# Patient Record
Sex: MALE | Race: WHITE | NOT HISPANIC OR LATINO | Employment: OTHER | URBAN - METROPOLITAN AREA
[De-identification: names, ages, dates, MRNs, and addresses within clinical notes are randomized per-mention and may not be internally consistent; named-entity substitution may affect disease eponyms.]

---

## 2017-02-02 DIAGNOSIS — Z11.59 NEED FOR HEPATITIS C SCREENING TEST: ICD-10-CM

## 2017-02-02 DIAGNOSIS — E78.2 MIXED HYPERLIPIDEMIA: ICD-10-CM

## 2017-02-02 DIAGNOSIS — Z00.00 HEALTH CARE MAINTENANCE: Primary | ICD-10-CM

## 2017-02-07 LAB
ALBUMIN SERPL-MCNC: 4.9 G/DL (ref 3.5–5.2)
ALBUMIN/GLOB SERPL: 2 G/DL
ALP SERPL-CCNC: 81 U/L (ref 39–117)
ALT SERPL-CCNC: 29 U/L (ref 1–41)
APPEARANCE UR: CLEAR
AST SERPL-CCNC: 21 U/L (ref 1–40)
BACTERIA #/AREA URNS HPF: NORMAL /HPF
BASOPHILS # BLD AUTO: 0.02 10*3/MM3 (ref 0–0.2)
BASOPHILS NFR BLD AUTO: 0.3 % (ref 0–1.5)
BILIRUB SERPL-MCNC: 1.1 MG/DL (ref 0.1–1.2)
BILIRUB UR QL STRIP: NEGATIVE
BUN SERPL-MCNC: 13 MG/DL (ref 8–23)
BUN/CREAT SERPL: 11.2 (ref 7–25)
CALCIUM SERPL-MCNC: 9.6 MG/DL (ref 8.6–10.5)
CHLORIDE SERPL-SCNC: 98 MMOL/L (ref 98–107)
CHOLEST SERPL-MCNC: 155 MG/DL (ref 0–200)
CO2 SERPL-SCNC: 25.5 MMOL/L (ref 22–29)
COLOR UR: YELLOW
CREAT SERPL-MCNC: 1.16 MG/DL (ref 0.76–1.27)
EOSINOPHIL # BLD AUTO: 0.22 10*3/MM3 (ref 0–0.7)
EOSINOPHIL NFR BLD AUTO: 3 % (ref 0.3–6.2)
EPI CELLS #/AREA URNS HPF: NORMAL /HPF
ERYTHROCYTE [DISTWIDTH] IN BLOOD BY AUTOMATED COUNT: 13.1 % (ref 11.5–14.5)
GLOBULIN SER CALC-MCNC: 2.5 GM/DL
GLUCOSE SERPL-MCNC: 91 MG/DL (ref 65–99)
GLUCOSE UR QL: NEGATIVE
HBA1C MFR BLD: 5.4 % (ref 4.8–5.6)
HCT VFR BLD AUTO: 47.6 % (ref 40.4–52.2)
HCV AB S/CO SERPL IA: <0.1 S/CO RATIO (ref 0–0.9)
HDLC SERPL-MCNC: 59 MG/DL (ref 40–60)
HGB BLD-MCNC: 15.2 G/DL (ref 13.7–17.6)
HGB UR QL STRIP: NEGATIVE
IMM GRANULOCYTES # BLD: 0.02 10*3/MM3 (ref 0–0.03)
IMM GRANULOCYTES NFR BLD: 0.3 % (ref 0–0.5)
KETONES UR QL STRIP: NEGATIVE
LDLC SERPL CALC-MCNC: 64 MG/DL (ref 0–100)
LEUKOCYTE ESTERASE UR QL STRIP: NEGATIVE
LYMPHOCYTES # BLD AUTO: 1.97 10*3/MM3 (ref 0.9–4.8)
LYMPHOCYTES NFR BLD AUTO: 27.1 % (ref 19.6–45.3)
MCH RBC QN AUTO: 29.5 PG (ref 27–32.7)
MCHC RBC AUTO-ENTMCNC: 31.9 G/DL (ref 32.6–36.4)
MCV RBC AUTO: 92.4 FL (ref 79.8–96.2)
MICRO URNS: NORMAL
MICRO URNS: NORMAL
MONOCYTES # BLD AUTO: 0.7 10*3/MM3 (ref 0.2–1.2)
MONOCYTES NFR BLD AUTO: 9.6 % (ref 5–12)
MUCOUS THREADS URNS QL MICRO: PRESENT /HPF
NEUTROPHILS # BLD AUTO: 4.35 10*3/MM3 (ref 1.9–8.1)
NEUTROPHILS NFR BLD AUTO: 59.7 % (ref 42.7–76)
NITRITE UR QL STRIP: NEGATIVE
PH UR STRIP: 7 [PH] (ref 5–7.5)
PLATELET # BLD AUTO: 196 10*3/MM3 (ref 140–500)
POTASSIUM SERPL-SCNC: 4.3 MMOL/L (ref 3.5–5.2)
PROT SERPL-MCNC: 7.4 G/DL (ref 6–8.5)
PROT UR QL STRIP: NEGATIVE
PSA SERPL-MCNC: 0.7 NG/ML (ref 0–4)
RBC # BLD AUTO: 5.15 10*6/MM3 (ref 4.6–6)
RBC #/AREA URNS HPF: NORMAL /HPF
SODIUM SERPL-SCNC: 140 MMOL/L (ref 136–145)
SP GR UR: 1.02 (ref 1–1.03)
TRIGL SERPL-MCNC: 160 MG/DL (ref 0–150)
TSH SERPL DL<=0.005 MIU/L-ACNC: 2.01 MIU/ML (ref 0.27–4.2)
URINALYSIS REFLEX: NORMAL
UROBILINOGEN UR STRIP-MCNC: 0.2 MG/DL (ref 0.2–1)
VLDLC SERPL CALC-MCNC: 32 MG/DL (ref 5–40)
WBC # BLD AUTO: 7.28 10*3/MM3 (ref 4.5–10.7)
WBC #/AREA URNS HPF: NORMAL /HPF

## 2017-02-16 ENCOUNTER — OFFICE VISIT (OUTPATIENT)
Dept: INTERNAL MEDICINE | Facility: CLINIC | Age: 62
End: 2017-02-16

## 2017-02-16 VITALS
RESPIRATION RATE: 18 BRPM | SYSTOLIC BLOOD PRESSURE: 116 MMHG | HEART RATE: 80 BPM | HEIGHT: 73 IN | OXYGEN SATURATION: 98 % | DIASTOLIC BLOOD PRESSURE: 72 MMHG | BODY MASS INDEX: 28.63 KG/M2 | WEIGHT: 216 LBS

## 2017-02-16 DIAGNOSIS — Z00.00 HEALTH MAINTENANCE EXAMINATION: Primary | ICD-10-CM

## 2017-02-16 DIAGNOSIS — E78.2 MIXED HYPERLIPIDEMIA: ICD-10-CM

## 2017-02-16 DIAGNOSIS — M25.561 ACUTE PAIN OF RIGHT KNEE: ICD-10-CM

## 2017-02-16 DIAGNOSIS — M79.672 CHRONIC PAIN OF LEFT HEEL: ICD-10-CM

## 2017-02-16 DIAGNOSIS — G89.29 CHRONIC PAIN OF LEFT HEEL: ICD-10-CM

## 2017-02-16 PROCEDURE — 99396 PREV VISIT EST AGE 40-64: CPT | Performed by: INTERNAL MEDICINE

## 2017-02-16 NOTE — PROGRESS NOTES
Chief Complaint  Uriel Garcia is a 61 y.o. male who presents for Annual Exam (CPE)  .    History of Present Illness   Uriel is a new patient here to establish care.  He was a former patient of Dr. Melgar and then Dr. Lopez.    Prostate Exam: his work does a PSA annually.  He declines a rectal exam.    C-scope: 2014 due 2017 Dr. Skaggs    Exercise: 4 days a week for 1.5 hours.      Left heel pain for 6 months.  He has orthotics.  He stopped using his orthotics to see if this is the culprit.  He has recently started to have pain in his right knee which he suspects is the result of changing his gait.    He also has an elbow issue that he saw PT for.  It's not tennis elbow.  He can play tennis now without it bothering him.  He has a popping in his shoulder and elbow.  He had some issues with his kidney function.  He was told he should avoid nsaids due to this but he has had to start using it more.      Immunization History   Administered Date(s) Administered   • Influenza (IM) Preservative Free 10/20/2016   • Influenza TIV (IM) 11/17/2015   • Td 08/03/2015       Review of Systems   Constitutional: Negative for chills, fatigue and fever.   HENT: Negative for hearing loss, sore throat, tinnitus, trouble swallowing and voice change.    Eyes: Negative for visual disturbance.   Respiratory: Negative for cough and shortness of breath.    Cardiovascular: Negative for chest pain, palpitations and leg swelling.   Gastrointestinal: Positive for abdominal pain (he has had some cramping after lunch for the last 4-5 months.  ). Negative for abdominal distention, anal bleeding, blood in stool, constipation, diarrhea, nausea and vomiting.   Endocrine: Negative for polydipsia and polyuria.   Genitourinary: Negative for decreased urine volume, dysuria and hematuria.   Musculoskeletal: Positive for arthralgias. Negative for myalgias.   Skin: Negative for rash.   Neurological: Negative for dizziness, syncope, light-headedness and  headaches.   Hematological: Negative for adenopathy. Does not bruise/bleed easily.   Psychiatric/Behavioral: Negative for confusion and dysphoric mood. The patient is nervous/anxious (sometimes).        Patient Active Problem List   Diagnosis   • Anxiety   • Hyperlipidemia   • Right elbow pain   • Healthcare maintenance   • Right knee pain   • Chronic pain of left heel       Past Medical History   Diagnosis Date   • Anxiety    • Benign colonic polyp    • BPPV (benign paroxysmal positional vertigo) 10/20/2016   • Fall from slip, trip, or stumble    • Hyperlipidemia      was on higher dose of Lipitor in past but had elevated LFTs. Added Zetia and now in good control   • Inguinal hernia    • Leg edema, right        Past Surgical History   Procedure Laterality Date   • Colonoscopy       Dr King/EBONY   • Hernia repair       Dr Garnett/EBONY       Family History   Problem Relation Age of Onset   • Hypertension Father    • Heart attack Maternal Grandmother    • Heart disease Maternal Grandmother      ASCVD   • Depression Other      siblings   • Diabetes Other    • Diabetes Sister    • Alcohol abuse Brother    • Diabetes Brother    • Hodgkin's lymphoma Maternal Grandfather        Social History     Social History   • Marital status:      Spouse name: Mamta   • Number of children: 2   • Years of education: N/A     Occupational History   •       Social History Main Topics   • Smoking status: Never Smoker   • Smokeless tobacco: Not on file   • Alcohol use Yes      Comment: social about 8 drinks a week   • Drug use: No   • Sexual activity: Defer     Other Topics Concern   • Not on file     Social History Narrative       Current Outpatient Prescriptions on File Prior to Visit   Medication Sig Dispense Refill   • atorvastatin (LIPITOR) 10 MG tablet Take 1 tablet by mouth daily.     • [DISCONTINUED] ZETIA 10 MG tablet TAKE 1 TABLET DAILY 90 tablet 1   • meclizine (ANTIVERT) 25 MG tablet Take 1 tablet by  "mouth 3 (Three) Times a Day As Needed for dizziness. 30 tablet 0     No current facility-administered medications on file prior to visit.        No Known Allergies    Vitals:    02/16/17 1250   BP: 116/72   Pulse: 80   Resp: 18   SpO2: 98%   Weight: 216 lb (98 kg)   Height: 73\" (185.4 cm)       Body mass index is 28.5 kg/(m^2).    Objective   Physical Exam   Constitutional: He is oriented to person, place, and time. He appears well-developed and well-nourished. No distress.   HENT:   Head: Normocephalic and atraumatic.   Right Ear: Hearing and external ear normal.   Left Ear: Hearing and external ear normal.   Nose: Nose normal.   Mouth/Throat: Oropharynx is clear and moist and mucous membranes are normal.   Eyes: Conjunctivae, EOM and lids are normal. Pupils are equal, round, and reactive to light. No scleral icterus.   Neck: Trachea normal and normal range of motion. Neck supple.   Cardiovascular: Normal rate, regular rhythm and normal heart sounds.  Exam reveals no gallop and no friction rub.    No murmur heard.  Pulses:       Carotid pulses are 2+ on the right side, and 2+ on the left side.       Femoral pulses are 2+ on the right side, and 2+ on the left side.       Dorsalis pedis pulses are 2+ on the right side, and 2+ on the left side.        Posterior tibial pulses are 2+ on the right side, and 2+ on the left side.   Pulmonary/Chest: Effort normal and breath sounds normal. No respiratory distress. He has no wheezes. He has no rales.   Abdominal: Soft. Normal appearance and bowel sounds are normal. He exhibits no distension and no mass. There is no tenderness.   Musculoskeletal: Normal range of motion. He exhibits no edema.       Vascular Status -  His exam exhibits no right foot edema. His exam exhibits no left foot edema.   Skin Integrity  -  His right foot skin is intact.     Uriel 's left foot skin is intact. .  Lymphadenopathy:     He has no cervical adenopathy.        Right: No inguinal and no " supraclavicular adenopathy present.        Left: No inguinal and no supraclavicular adenopathy present.   Neurological: He is alert and oriented to person, place, and time. He has normal strength. No cranial nerve deficit. He exhibits normal muscle tone. Coordination and gait normal.   Skin: Skin is warm and dry. No rash noted.   Psychiatric: He has a normal mood and affect. His speech is normal and behavior is normal. Judgment and thought content normal. Cognition and memory are normal.   Vitals reviewed.        Results for orders placed or performed in visit on 02/02/17   Comprehensive Metabolic Panel   Result Value Ref Range    Glucose 91 65 - 99 mg/dL    BUN 13 8 - 23 mg/dL    Creatinine 1.16 0.76 - 1.27 mg/dL    eGFR Non African Am 64 >60 mL/min/1.73    eGFR African Am 78 >60 mL/min/1.73    BUN/Creatinine Ratio 11.2 7.0 - 25.0    Sodium 140 136 - 145 mmol/L    Potassium 4.3 3.5 - 5.2 mmol/L    Chloride 98 98 - 107 mmol/L    Total CO2 25.5 22.0 - 29.0 mmol/L    Calcium 9.6 8.6 - 10.5 mg/dL    Total Protein 7.4 6.0 - 8.5 g/dL    Albumin 4.90 3.50 - 5.20 g/dL    Globulin 2.5 gm/dL    A/G Ratio 2.0 g/dL    Total Bilirubin 1.1 0.1 - 1.2 mg/dL    Alkaline Phosphatase 81 39 - 117 U/L    AST (SGOT) 21 1 - 40 U/L    ALT (SGPT) 29 1 - 41 U/L   Lipid Panel   Result Value Ref Range    Total Cholesterol 155 0 - 200 mg/dL    Triglycerides 160 (H) 0 - 150 mg/dL    HDL Cholesterol 59 40 - 60 mg/dL    VLDL Cholesterol 32 5 - 40 mg/dL    LDL Cholesterol  64 0 - 100 mg/dL   TSH Rfx On Abnormal To Free T4   Result Value Ref Range    TSH 2.01 0.27 - 4.2 mIU/mL   UA / M With / Rflx Culture([facility] ONLY)   Result Value Ref Range    Specific Gravity, UA 1.024 1.005 - 1.030    pH, UA 7.0 5.0 - 7.5    Color, UA Yellow Yellow    Appearance, UA Clear Clear    Leukocytes, UA Negative Negative    Protein Negative Negative/Trace    Glucose, UA Negative Negative    Ketones Negative Negative    Blood, UA Negative Negative    Bilirubin, UA  Negative Negative    Urobilinogen, UA 0.2 0.2 - 1.0 mg/dL    Nitrite, UA Negative Negative    Microscopic Examination Comment     MICROSCOPIC EXAMINATION See below:     Urinalysis Reflex Comment    PSA   Result Value Ref Range    PSA 0.704 0.000 - 4.000 ng/mL   Hemoglobin A1c   Result Value Ref Range    Hemoglobin A1C 5.40 4.80 - 5.60 %   Hepatitis C Antibody   Result Value Ref Range    Hep C Virus Ab <0.1 0.0 - 0.9 s/co ratio   Microscopic Examination   Result Value Ref Range    WBC, UA 0-5 0 - 5 /hpf    RBC, UA 0-2 0 - 2 /hpf    Epithelial Cells (non renal) None seen 0 - 10 /hpf    Mucus, UA Present Not Estab. /hpf    Bacteria, UA None seen None seen/Few /hpf   CBC & Differential   Result Value Ref Range    WBC 7.28 4.50 - 10.70 10*3/mm3    RBC 5.15 4.60 - 6.00 10*6/mm3    Hemoglobin 15.2 13.7 - 17.6 g/dL    Hematocrit 47.6 40.4 - 52.2 %    MCV 92.4 79.8 - 96.2 fL    MCH 29.5 27.0 - 32.7 pg    MCHC 31.9 (L) 32.6 - 36.4 g/dL    RDW 13.1 11.5 - 14.5 %    Platelets 196 140 - 500 10*3/mm3    Neutrophil Rel % 59.7 42.7 - 76.0 %    Lymphocyte Rel % 27.1 19.6 - 45.3 %    Monocyte Rel % 9.6 5.0 - 12.0 %    Eosinophil Rel % 3.0 0.3 - 6.2 %    Basophil Rel % 0.3 0.0 - 1.5 %    Neutrophils Absolute 4.35 1.90 - 8.10 10*3/mm3    Lymphocytes Absolute 1.97 0.90 - 4.80 10*3/mm3    Monocytes Absolute 0.70 0.20 - 1.20 10*3/mm3    Eosinophils Absolute 0.22 0.00 - 0.70 10*3/mm3    Basophils Absolute 0.02 0.00 - 0.20 10*3/mm3    Immature Granulocyte Rel % 0.3 0.0 - 0.5 %    Immature Grans Absolute 0.02 0.00 - 0.03 10*3/mm3       Assessment/Plan   Diagnoses and all orders for this visit:    Health maintenance examination  -     Cancel: Varicella-Zoster Vaccine Subcutaneous    Mixed hyperlipidemia  -     Hepatic Function Panel; Future  -     Lipid Panel; Future    Acute pain of right knee  -     Ambulatory Referral to Orthopedic Surgery    Chronic pain of left heel  -     Ambulatory Referral to Orthopedic  Surgery        Discussion/Summary  Uriel is here for his physical to establish care.  He is a very nice 60 y/o with few medical issues.  I am stopping his zetia as I do not think he needs this.  Continue atorvastatin.  His biggest issue is his heel and knee pain.  He is very active and wants to remain active.  I am going to have him see Dr. Ibanez and if he thinks he should see Dr. Pichardo for his foot, I will let him make that call.    Return in about 6 months (around 8/16/2017) for Next scheduled follow up, with fasting labs prior.

## 2017-02-21 RX ORDER — ATORVASTATIN CALCIUM 10 MG/1
10 TABLET, FILM COATED ORAL DAILY
Qty: 90 TABLET | Refills: 3 | Status: SHIPPED | OUTPATIENT
Start: 2017-02-21 | End: 2018-01-24 | Stop reason: SDUPTHER

## 2017-08-02 ENCOUNTER — OFFICE VISIT (OUTPATIENT)
Dept: INTERNAL MEDICINE | Facility: CLINIC | Age: 62
End: 2017-08-02

## 2017-08-02 VITALS
SYSTOLIC BLOOD PRESSURE: 112 MMHG | HEIGHT: 73 IN | DIASTOLIC BLOOD PRESSURE: 84 MMHG | OXYGEN SATURATION: 99 % | RESPIRATION RATE: 18 BRPM | BODY MASS INDEX: 28.63 KG/M2 | HEART RATE: 80 BPM | WEIGHT: 216 LBS

## 2017-08-02 DIAGNOSIS — M25.611 DECREASED RANGE OF MOTION OF RIGHT SHOULDER: ICD-10-CM

## 2017-08-02 DIAGNOSIS — M25.621 DECREASED RANGE OF MOTION OF RIGHT ELBOW: ICD-10-CM

## 2017-08-02 DIAGNOSIS — M25.511 CHRONIC RIGHT SHOULDER PAIN: Primary | ICD-10-CM

## 2017-08-02 DIAGNOSIS — G89.29 CHRONIC RIGHT SHOULDER PAIN: Primary | ICD-10-CM

## 2017-08-02 PROCEDURE — 99213 OFFICE O/P EST LOW 20 MIN: CPT | Performed by: INTERNAL MEDICINE

## 2017-08-02 NOTE — PROGRESS NOTES
Chief Complaint  Uriel Garcia is a 61 y.o. male who presents for Arm Pain (Right arm, pain for over a year now. Whole shoulder has terrible pain when reaching far or putting his arm in certain positions. Pt is becoming left handed because of the pain. ) and Follow-up (Had mentioned this once before, but getting worse. All started because of a massage, they pulled his arm backwards and the shoulder popped really loud, )  .    History of Present Illness   Many months ago, he had an elbow issue on the right.  He was treated with PT.  He did not have an MRI because Dr. Lopez didn't feel like insurance would pay for an MRI.  He developed a popping in his shoulder and a locking in his shoulder prior to the shoulder popping.  He has quit playing tennis.  Lately, his pain has been getting worse.  The pain gets to an 8/10.  It only lasts 5-10 seconds at a time.  He can't reach laterally or back.  There are times he has pain putting his belt on or washing his back.  He had been taking meloxicam for about three months.  This didn't help all that much.  No weakness in his arm.  He has tried icing his shoulder but it didn't really help.  The pain is now waking him up at night.       Review of Systems   Musculoskeletal: Positive for joint pain (right shoulder). Negative for back pain, falls, joint swelling, muscle weakness and neck pain.       Patient Active Problem List   Diagnosis   • Anxiety   • Hyperlipidemia   • Right elbow pain   • Healthcare maintenance   • Right knee pain   • Chronic pain of left heel   • Chronic right shoulder pain   • Decreased range of motion of right shoulder   • Decreased range of motion of right elbow       Past Medical History:   Diagnosis Date   • Anxiety    • Benign colonic polyp    • BPPV (benign paroxysmal positional vertigo) 10/20/2016   • Fall from slip, trip, or stumble    • Hyperlipidemia     was on higher dose of Lipitor in past but had elevated LFTs. Added Zetia and now in good  "control   • Inguinal hernia    • Leg edema, right        Past Surgical History:   Procedure Laterality Date   • COLONOSCOPY      Dr King/EBONY   • HERNIA REPAIR      Dr Garnett/EBONY       Family History   Problem Relation Age of Onset   • Hypertension Father    • Heart attack Maternal Grandmother    • Heart disease Maternal Grandmother      ASCVD   • Depression Other      siblings   • Diabetes Other    • Diabetes Sister    • Alcohol abuse Brother    • Diabetes Brother    • Hodgkin's lymphoma Maternal Grandfather        Social History     Social History   • Marital status:      Spouse name: Mamta   • Number of children: 2   • Years of education: N/A     Occupational History   •       Social History Main Topics   • Smoking status: Never Smoker   • Smokeless tobacco: Not on file   • Alcohol use Yes      Comment: social about 8 drinks a week   • Drug use: No   • Sexual activity: Defer     Other Topics Concern   • Not on file     Social History Narrative       Current Outpatient Prescriptions on File Prior to Visit   Medication Sig Dispense Refill   • atorvastatin (LIPITOR) 10 MG tablet Take 1 tablet by mouth Daily. 90 tablet 3   • meclizine (ANTIVERT) 25 MG tablet Take 1 tablet by mouth 3 (Three) Times a Day As Needed for dizziness. 30 tablet 0     No current facility-administered medications on file prior to visit.        No Known Allergies    Vitals:    08/02/17 1406   BP: 112/84   Pulse: 80   Resp: 18   SpO2: 99%   Weight: 216 lb (98 kg)   Height: 73\" (185.4 cm)       Body mass index is 28.5 kg/(m^2).    Objective   Physical Exam   Constitutional: He appears well-developed and well-nourished. No distress.   HENT:   Head: Normocephalic and atraumatic.   Neck: Normal range of motion. Neck supple.   Musculoskeletal:        Right shoulder: He exhibits decreased range of motion, tenderness and pain. He exhibits no bony tenderness, no swelling, no effusion, no crepitus and no spasm.        Right " elbow: He exhibits decreased range of motion. He exhibits no swelling and no effusion. No tenderness found.   5/5 strength.  Very limited ROM due to pain.  Unable to really put his right arm behind his back.  He really braces against most of the shoulder manipulation for fear of pain.    Lymphadenopathy:     He has no cervical adenopathy.       Results for orders placed or performed in visit on 02/02/17   Comprehensive Metabolic Panel   Result Value Ref Range    Glucose 91 65 - 99 mg/dL    BUN 13 8 - 23 mg/dL    Creatinine 1.16 0.76 - 1.27 mg/dL    eGFR Non African Am 64 >60 mL/min/1.73    eGFR African Am 78 >60 mL/min/1.73    BUN/Creatinine Ratio 11.2 7.0 - 25.0    Sodium 140 136 - 145 mmol/L    Potassium 4.3 3.5 - 5.2 mmol/L    Chloride 98 98 - 107 mmol/L    Total CO2 25.5 22.0 - 29.0 mmol/L    Calcium 9.6 8.6 - 10.5 mg/dL    Total Protein 7.4 6.0 - 8.5 g/dL    Albumin 4.90 3.50 - 5.20 g/dL    Globulin 2.5 gm/dL    A/G Ratio 2.0 g/dL    Total Bilirubin 1.1 0.1 - 1.2 mg/dL    Alkaline Phosphatase 81 39 - 117 U/L    AST (SGOT) 21 1 - 40 U/L    ALT (SGPT) 29 1 - 41 U/L   Lipid Panel   Result Value Ref Range    Total Cholesterol 155 0 - 200 mg/dL    Triglycerides 160 (H) 0 - 150 mg/dL    HDL Cholesterol 59 40 - 60 mg/dL    VLDL Cholesterol 32 5 - 40 mg/dL    LDL Cholesterol  64 0 - 100 mg/dL   TSH Rfx On Abnormal To Free T4   Result Value Ref Range    TSH 2.01 0.27 - 4.2 mIU/mL   UA / M With / Rflx Culture([facility] ONLY)   Result Value Ref Range    Specific Gravity, UA 1.024 1.005 - 1.030    pH, UA 7.0 5.0 - 7.5    Color, UA Yellow Yellow    Appearance, UA Clear Clear    Leukocytes, UA Negative Negative    Protein Negative Negative/Trace    Glucose, UA Negative Negative    Ketones Negative Negative    Blood, UA Negative Negative    Bilirubin, UA Negative Negative    Urobilinogen, UA 0.2 0.2 - 1.0 mg/dL    Nitrite, UA Negative Negative    Microscopic Examination Comment     MICROSCOPIC EXAMINATION See below:      Urinalysis Reflex Comment    PSA   Result Value Ref Range    PSA 0.704 0.000 - 4.000 ng/mL   Hemoglobin A1c   Result Value Ref Range    Hemoglobin A1C 5.40 4.80 - 5.60 %   Hepatitis C Antibody   Result Value Ref Range    Hep C Virus Ab <0.1 0.0 - 0.9 s/co ratio   Microscopic Examination   Result Value Ref Range    WBC, UA 0-5 0 - 5 /hpf    RBC, UA 0-2 0 - 2 /hpf    Epithelial Cells (non renal) None seen 0 - 10 /hpf    Mucus, UA Present Not Estab. /hpf    Bacteria, UA None seen None seen/Few /hpf   CBC & Differential   Result Value Ref Range    WBC 7.28 4.50 - 10.70 10*3/mm3    RBC 5.15 4.60 - 6.00 10*6/mm3    Hemoglobin 15.2 13.7 - 17.6 g/dL    Hematocrit 47.6 40.4 - 52.2 %    MCV 92.4 79.8 - 96.2 fL    MCH 29.5 27.0 - 32.7 pg    MCHC 31.9 (L) 32.6 - 36.4 g/dL    RDW 13.1 11.5 - 14.5 %    Platelets 196 140 - 500 10*3/mm3    Neutrophil Rel % 59.7 42.7 - 76.0 %    Lymphocyte Rel % 27.1 19.6 - 45.3 %    Monocyte Rel % 9.6 5.0 - 12.0 %    Eosinophil Rel % 3.0 0.3 - 6.2 %    Basophil Rel % 0.3 0.0 - 1.5 %    Neutrophils Absolute 4.35 1.90 - 8.10 10*3/mm3    Lymphocytes Absolute 1.97 0.90 - 4.80 10*3/mm3    Monocytes Absolute 0.70 0.20 - 1.20 10*3/mm3    Eosinophils Absolute 0.22 0.00 - 0.70 10*3/mm3    Basophils Absolute 0.02 0.00 - 0.20 10*3/mm3    Immature Granulocyte Rel % 0.3 0.0 - 0.5 %    Immature Grans Absolute 0.02 0.00 - 0.03 10*3/mm3       Assessment/Plan   Diagnoses and all orders for this visit:    Chronic right shoulder pain  -     Ambulatory Referral to Orthopedic Surgery    Decreased range of motion of right shoulder  -     Ambulatory Referral to Orthopedic Surgery    Decreased range of motion of right elbow      Discussion/Summary  Uriel is here for acute care for a new and worsening issue.  Rather than send him for an MRI first, we are going to try to get him in to see Dr. Barger in case he would prefer to get an MR arthrogram of his shoulder.  His symptoms have worsened such that he didn't want to wait  two weeks to see me at his regularly scheduled follow up appt.    No Follow-up on file.

## 2017-08-11 DIAGNOSIS — E78.2 MIXED HYPERLIPIDEMIA: ICD-10-CM

## 2017-08-14 LAB
ALBUMIN SERPL-MCNC: 4.6 G/DL (ref 3.5–5.2)
ALP SERPL-CCNC: 63 U/L (ref 39–117)
ALT SERPL-CCNC: 29 U/L (ref 1–41)
AST SERPL-CCNC: 22 U/L (ref 1–40)
BILIRUB DIRECT SERPL-MCNC: 0.2 MG/DL (ref 0–0.3)
BILIRUB SERPL-MCNC: 1.2 MG/DL (ref 0.1–1.2)
CHOLEST SERPL-MCNC: 205 MG/DL (ref 0–200)
HDLC SERPL-MCNC: 54 MG/DL (ref 40–60)
LDLC SERPL CALC-MCNC: 127 MG/DL (ref 0–100)
PROT SERPL-MCNC: 7.3 G/DL (ref 6–8.5)
TRIGL SERPL-MCNC: 122 MG/DL (ref 0–150)
VLDLC SERPL CALC-MCNC: 24.4 MG/DL (ref 5–40)

## 2017-08-17 ENCOUNTER — OFFICE VISIT (OUTPATIENT)
Dept: INTERNAL MEDICINE | Facility: CLINIC | Age: 62
End: 2017-08-17

## 2017-08-17 VITALS
HEIGHT: 73 IN | HEART RATE: 69 BPM | RESPIRATION RATE: 18 BRPM | DIASTOLIC BLOOD PRESSURE: 82 MMHG | BODY MASS INDEX: 28.36 KG/M2 | OXYGEN SATURATION: 98 % | WEIGHT: 214 LBS | SYSTOLIC BLOOD PRESSURE: 132 MMHG

## 2017-08-17 DIAGNOSIS — M25.611 DECREASED RANGE OF MOTION OF RIGHT SHOULDER: ICD-10-CM

## 2017-08-17 DIAGNOSIS — E78.2 MIXED HYPERLIPIDEMIA: Primary | ICD-10-CM

## 2017-08-17 DIAGNOSIS — N52.9 ERECTILE DYSFUNCTION, UNSPECIFIED ERECTILE DYSFUNCTION TYPE: ICD-10-CM

## 2017-08-17 PROCEDURE — 99214 OFFICE O/P EST MOD 30 MIN: CPT | Performed by: INTERNAL MEDICINE

## 2017-08-17 RX ORDER — SILDENAFIL 50 MG/1
50 TABLET, FILM COATED ORAL DAILY PRN
Qty: 10 TABLET | Refills: 4 | Status: SHIPPED | OUTPATIENT
Start: 2017-08-17 | End: 2017-11-02 | Stop reason: SDUPTHER

## 2017-08-17 NOTE — PROGRESS NOTES
Chief Complaint  Uriel Garcia is a 61 y.o. male who presents for Hyperlipidemia and Follow-up (6 month)  .    History of Present Illness     He saw a PA with the ortho group for his knee.  She had told him he would need knee surgery.  He finally saw Dr. Ibanez who told him he doesn't need surgery or knee replacement.  His knee is significantly better after medical treatment.    He saw Dr. Barger for his shoulder.  He has an MRI scheduled for tomorrow.  Dr. Barger believes this to be adhesive capsulitis and that he will likely benefit from surgical intervention.      HLD - No cp or palp or soa.  We did take him off of zetia.  His LDL increased some but he does not have DM or vascular disease.      He has a new issue to address.  He has erectile dysfunction.  He has never taken anything for this because his wife has been reluctant to have him use medication.  When he was a patient of Dr. Melgar, he was having similar issues and is positive that Ramón checked his testosterone on several occasions and that it was never low.        Review of Systems   Constitution: Negative for chills, fever, malaise/fatigue, weight gain and weight loss.   Cardiovascular: Negative for chest pain, dyspnea on exertion, leg swelling and palpitations.   Musculoskeletal: Positive for joint pain and stiffness.   Genitourinary: Negative for decreased libido.        ED       Patient Active Problem List   Diagnosis   • Anxiety   • Hyperlipidemia   • Right elbow pain   • Healthcare maintenance   • Right knee pain   • Chronic pain of left heel   • Chronic right shoulder pain   • Decreased range of motion of right shoulder   • Decreased range of motion of right elbow   • Erectile dysfunction       Past Medical History:   Diagnosis Date   • Anxiety    • Benign colonic polyp    • BPPV (benign paroxysmal positional vertigo) 10/20/2016   • Fall from slip, trip, or stumble    • Hyperlipidemia     was on higher dose of Lipitor in past but had elevated  "LFTs. Added Zetia and now in good control   • Inguinal hernia    • Leg edema, right        Past Surgical History:   Procedure Laterality Date   • COLONOSCOPY      Dr King/EBONY   • HERNIA REPAIR      Dr Garnett/EBONY       Family History   Problem Relation Age of Onset   • Hypertension Father    • Heart attack Maternal Grandmother    • Heart disease Maternal Grandmother      ASCVD   • Depression Other      siblings   • Diabetes Other    • Diabetes Sister    • Alcohol abuse Brother    • Diabetes Brother    • Hodgkin's lymphoma Maternal Grandfather        Social History     Social History   • Marital status:      Spouse name: Mamta   • Number of children: 2   • Years of education: N/A     Occupational History   •       Social History Main Topics   • Smoking status: Never Smoker   • Smokeless tobacco: Not on file   • Alcohol use Yes      Comment: social about 8 drinks a week   • Drug use: No   • Sexual activity: Defer     Other Topics Concern   • Not on file     Social History Narrative       Current Outpatient Prescriptions on File Prior to Visit   Medication Sig Dispense Refill   • atorvastatin (LIPITOR) 10 MG tablet Take 1 tablet by mouth Daily. 90 tablet 3   • meclizine (ANTIVERT) 25 MG tablet Take 1 tablet by mouth 3 (Three) Times a Day As Needed for dizziness. 30 tablet 0     No current facility-administered medications on file prior to visit.        No Known Allergies    Vitals:    08/17/17 1434   BP: 132/82   Pulse: 69   Resp: 18   SpO2: 98%   Weight: 214 lb (97.1 kg)   Height: 73\" (185.4 cm)       Body mass index is 28.23 kg/(m^2).    Objective   Physical Exam   Constitutional: He is oriented to person, place, and time. He appears well-developed and well-nourished. No distress.   HENT:   Head: Normocephalic and atraumatic.   Eyes: Conjunctivae are normal. No scleral icterus.   Neck: Normal range of motion. Neck supple.   Cardiovascular: Normal rate, regular rhythm and normal heart sounds. "  Exam reveals no gallop and no friction rub.    No murmur heard.  Pulmonary/Chest: Effort normal and breath sounds normal. No respiratory distress. He has no wheezes. He has no rales.   Musculoskeletal: Normal range of motion. He exhibits no edema.   Lymphadenopathy:     He has no cervical adenopathy.   Neurological: He is alert and oriented to person, place, and time. No cranial nerve deficit.   Psychiatric: He has a normal mood and affect. His behavior is normal. Judgment and thought content normal.       Results for orders placed or performed in visit on 08/11/17   Lipid Panel   Result Value Ref Range    Total Cholesterol 205 (H) 0 - 200 mg/dL    Triglycerides 122 0 - 150 mg/dL    HDL Cholesterol 54 40 - 60 mg/dL    VLDL Cholesterol 24.4 5 - 40 mg/dL    LDL Cholesterol  127 (H) 0 - 100 mg/dL   Hepatic Function Panel   Result Value Ref Range    Total Protein 7.3 6.0 - 8.5 g/dL    Albumin 4.60 3.50 - 5.20 g/dL    Total Bilirubin 1.2 0.1 - 1.2 mg/dL    Bilirubin, Direct 0.2 0.0 - 0.3 mg/dL    Alkaline Phosphatase 63 39 - 117 U/L    AST (SGOT) 22 1 - 40 U/L    ALT (SGPT) 29 1 - 41 U/L       Assessment/Plan   Diagnoses and all orders for this visit:    Mixed hyperlipidemia    Erectile dysfunction, unspecified erectile dysfunction type  -     sildenafil (VIAGRA) 50 MG tablet; Take 1 tablet by mouth Daily As Needed for erectile dysfunction.    Decreased range of motion of right shoulder        Discussion/Summary  Uriel is here to follow up on the issues as noted above.  His labs all look good.  His inc in LDL since being off of zetia is acceptable.  He is on a very low dose of lipitor that we could increase if his goal LDL were to change. I have discussed his ED in detail and have offered to retest his testosterone but he declined.  He would rather try viagra now that his wife is agreeable to this .   Return in about 6 months (around 2/17/2018) for Annual physical, with fasting labs prior.

## 2017-09-08 ENCOUNTER — APPOINTMENT (OUTPATIENT)
Dept: PREADMISSION TESTING | Facility: HOSPITAL | Age: 62
End: 2017-09-08

## 2017-09-08 VITALS
OXYGEN SATURATION: 97 % | HEART RATE: 69 BPM | WEIGHT: 218.56 LBS | TEMPERATURE: 97.8 F | DIASTOLIC BLOOD PRESSURE: 75 MMHG | RESPIRATION RATE: 16 BRPM | BODY MASS INDEX: 28.97 KG/M2 | HEIGHT: 73 IN | SYSTOLIC BLOOD PRESSURE: 124 MMHG

## 2017-09-08 LAB
ALBUMIN SERPL-MCNC: 4.4 G/DL (ref 3.5–5.2)
ALBUMIN/GLOB SERPL: 1.6 G/DL
ALP SERPL-CCNC: 58 U/L (ref 39–117)
ALT SERPL W P-5'-P-CCNC: 30 U/L (ref 1–41)
ANION GAP SERPL CALCULATED.3IONS-SCNC: 13 MMOL/L
AST SERPL-CCNC: 22 U/L (ref 1–40)
BILIRUB SERPL-MCNC: 0.8 MG/DL (ref 0.1–1.2)
BUN BLD-MCNC: 14 MG/DL (ref 8–23)
BUN/CREAT SERPL: 14.4 (ref 7–25)
CALCIUM SPEC-SCNC: 9.1 MG/DL (ref 8.6–10.5)
CHLORIDE SERPL-SCNC: 104 MMOL/L (ref 98–107)
CO2 SERPL-SCNC: 24 MMOL/L (ref 22–29)
CREAT BLD-MCNC: 0.97 MG/DL (ref 0.76–1.27)
DEPRECATED RDW RBC AUTO: 40.9 FL (ref 37–54)
ERYTHROCYTE [DISTWIDTH] IN BLOOD BY AUTOMATED COUNT: 12.1 % (ref 11.5–14.5)
GFR SERPL CREATININE-BSD FRML MDRD: 79 ML/MIN/1.73
GLOBULIN UR ELPH-MCNC: 2.7 GM/DL
GLUCOSE BLD-MCNC: 86 MG/DL (ref 65–99)
HCT VFR BLD AUTO: 46.6 % (ref 40.4–52.2)
HGB BLD-MCNC: 15.5 G/DL (ref 13.7–17.6)
MCH RBC QN AUTO: 30.5 PG (ref 27–32.7)
MCHC RBC AUTO-ENTMCNC: 33.3 G/DL (ref 32.6–36.4)
MCV RBC AUTO: 91.6 FL (ref 79.8–96.2)
PLATELET # BLD AUTO: 157 10*3/MM3 (ref 140–500)
PMV BLD AUTO: 10.3 FL (ref 6–12)
POTASSIUM BLD-SCNC: 4.2 MMOL/L (ref 3.5–5.2)
PROT SERPL-MCNC: 7.1 G/DL (ref 6–8.5)
RBC # BLD AUTO: 5.09 10*6/MM3 (ref 4.6–6)
SODIUM BLD-SCNC: 141 MMOL/L (ref 136–145)
WBC NRBC COR # BLD: 5.74 10*3/MM3 (ref 4.5–10.7)

## 2017-09-08 PROCEDURE — 36415 COLL VENOUS BLD VENIPUNCTURE: CPT

## 2017-09-08 PROCEDURE — 93005 ELECTROCARDIOGRAM TRACING: CPT

## 2017-09-08 PROCEDURE — 80053 COMPREHEN METABOLIC PANEL: CPT | Performed by: ORTHOPAEDIC SURGERY

## 2017-09-08 PROCEDURE — 85027 COMPLETE CBC AUTOMATED: CPT | Performed by: ORTHOPAEDIC SURGERY

## 2017-09-08 PROCEDURE — 93010 ELECTROCARDIOGRAM REPORT: CPT | Performed by: INTERNAL MEDICINE

## 2017-09-08 NOTE — DISCHARGE INSTRUCTIONS
Take the following medications the morning of surgery with a small sip of water:    NONE    General Instructions:  • Do not eat solid food after midnight the night before surgery.  • You may drink clear liquids day of surgery but must stop at least one hour before your hospital arrival time.  • It is beneficial for you to have a clear drink that contains carbohydrates the day of surgery.  We suggest a 20 ounce bottle of Gatorade or Powerade for non-diabetic patients     Clear liquids are liquids you can see through.  Nothing red in color.     Plain water                               Sports drinks  Sodas                                   Gelatin (Jell-O)  Fruit juices without pulp such as white grape juice and apple juice  Popsicles that contain no fruit or yogurt  Tea or coffee (no cream or milk added)  Gatorade / Powerade  G2 / Powerade Zero    • Patients who avoid smoking, chewing tobacco and alcohol for 4 weeks prior to surgery have a reduced risk of post-operative complications.  Quit smoking as many days before surgery as you can.  • Do not smoke, use chewing tobacco or drink alcohol the day of surgery.   • Bring any papers given to you in the doctor’s office.  • Wear clean comfortable clothes and socks.  • Do not wear contact lenses or make-up.  Bring a case for your glasses.   • Leave all other valuables and jewelry at home.  • The Pre-Admission Testing nurse will instruct you to bring medications if unable to obtain an accurate list in Pre-Admission Testing.  • REPORT TO OUTPATIENT SURGERY ON 9-        Preventing a Surgical Site Infection:  • For 2 to 3 days before surgery, avoid shaving with a razor because the razor can irritate skin and make it easier to develop an infection.  • The night prior to surgery sleep in a clean bed with clean clothing.  Do not allow pets to sleep with you.  • Shower on the morning of surgery using a fresh bar of anti-bacterial soap (such as Dial) and clean washcloth.   Dry with a clean towel and dress in clean clothing.  • Ask your surgeon if you will be receiving antibiotics prior to surgery.  • Make sure you, your family, and all healthcare providers clean their hands with soap and water or an alcohol based hand  before caring for you or your wound.    Day of surgery:  Upon arrival, a Pre-op nurse and Anesthesiologist will review your health history, obtain vital signs, and answer questions you may have.  The only belongings needed at this time will be your home medications and if applicable your C-PAP/BI-PAP machine.  If you are staying overnight your family can leave the rest of your belongings in the car and bring them to your room later.  A Pre-op nurse will start an IV and you may receive medication in preparation for surgery, including something to help you relax.  Your family will be able to see you in the Pre-op area.  While you are in surgery your family should notify the waiting room  if they leave the waiting room area and provide a contact phone number.    Please be aware that surgery does come with discomfort.  We want to make every effort to control your discomfort so please discuss any uncontrolled symptoms with your nurse.   Your doctor will most likely have prescribed pain medications.      If you are going home after surgery you will receive individualized written care instructions before being discharged.  A responsible adult must drive you to and from the hospital on the day of your surgery and stay with you for 24 hours.    .    If you have any questions please call Pre-Admission Testing at [phone].    Deductibles and co-payments are collected on the day of service. Please be prepared to pay the required co-pay, deductible or deposit on the day of service as defined by your plan.

## 2017-09-12 RX ORDER — CEFAZOLIN SODIUM 2 G/100ML
2 INJECTION, SOLUTION INTRAVENOUS ONCE
Status: COMPLETED | OUTPATIENT
Start: 2017-09-14 | End: 2017-09-14

## 2017-09-12 NOTE — H&P
Provider: JORGE JIMENES MD  hospitals  Chief complaint right shoulder and elbow stiffness.  61-year-old  male who is right-hand dominant works as a  develop pain and stiffness in the right shoulder and elbow in May of this 2016.  He initially thought he is having difficulties with the elbow and was treated for tennis elbow and related symptoms.  He went to physical therapy for 2-1/2 months and he continued work on the area.  He had x-rays.  He also noticed that during the summer of 2016 that when he extended the elbow and occasionally lock up and then he would feel a pop in the shoulder in it within released somewhat.  This was intermittent.  He stopped playing tennis and some soreness improved but he continues with stiffness in the shoulder and elbow.  He has some trouble sleeping on the shoulder.  Pain level is between 1 and 8 out of 10.  He has been on Advil and Mobic in the past for anti-inflammatory use.  More recently his physician and asked him to decrease his usage of that medication.  Review of Systems:  Positive for: Depression.    Patient denies: Abdominal Pain, Bleeding, Chest Pain, Convulsions/Seizure, Decreased Motion, Difficulty Swallowing, Easy Bruisability, Emotional Disturbances, Eyes or Vision Problems, Fecal Incontinence, Fever/Chills, Headaches, Increased Thirst, Increased Hunger, Insomnia, Joint Pain, Nausea/Vomiting, Night Sweats, Poor Balance, Persistent Cough, Rash, Shortness of Breath, Shortness of Breath While Lying down, Skin Problems, Urinary Retention and Weakness.  Allergies:  * no known allergies  Medications:  mobic 15 mg tabs (meloxicam) 1 po qd  pennsaid 2 % trans soln (diclofenac sodium) apply bid to affected area  lipitor 10 mg oral tabs (atorvastatin calcium) daily  Patient History of:  DEPRESSION  ANXIETY DISORDER  BLOOD CLOTS/EMBOLISM - NEGATIVE  Surgical History:  Hernia Repair-[[id number]]   No Previous Orthopaedic Surgery-history   Known Family History  of:  cancer-grandparent  diabetes-sibling  Social History:  Social history taken on 08/14/2017 states MERCEDES STERLING is a  61 year old male.  He has never used tobacco products.      Past medical, social, family histories and ROS reviewed today with the patient and changes documented in the chart (08/14/2017).  Referring Dr. GENEVIEVE MARRUFO MD  PCP Dr. YARON MARTINEZ, ENRRIQUE KERR    Physical Exam  Height:  73 in.    Weight:  214 lbs.     BMI:  28.34  Pulse:  67  Blood pressure:  136 / 88 mm Hg  Mental/HEENT/Cardio/Skin  The patient's general appearance is well nourished, well hydrated, no acute distress.  Orientation is alert and oriented x 3.  The patient's mood is normal.  A head exam revealed normocephalic/atraumatic.  An eye exam revealed pupils equal.  An ears, nose, and throat exam shows normal, no lesions or deformities.  Cardiovascular exam indicates Regular rate and rhythm.  Pulmonary exam shows normal air exchange, no labored breathing, or shortness of breath.  A skin exam shows normal temperature and color in the area of examination.  A lymphatic exam reveals no adenopathy in the area of examination.      Right Shoulder  Skin is normal.  There is no warmth.  No erythema.  Lymphadenopathy is negative.  Right shoulder passive ROM today shows: Elevation = 90; ER(side) = 20; ER(abd) = 70; IR(vert) = waist.  Shoulder strength: Elevation = 5/5; ER = 5/5; IR = 5/5; ABD = 5/5.  No crepitation.  Neer test is positive.  Arc of motion is positive.  Pulses are normal.  Normal sensation.        Imaging/Diagnostic Studies  X-rays of the Right Shoulder [AP;Axillary;Grashey] were ordered and reviewed today.    Three-view x-ray right shoulder reveals normal osseous quality and joint space without abnormality.  Impression  Right shoulder adhesive capsulitis  Right elbow stiffness    maging/Diagnostic Studies  MRI right shoulder shows thickening of the axillary pouch consistent with adhesive capsulitis.  There is  rotator cuff tendinitis but no tears noted.  Impression  Right shoulder adhesive capsulitis  Right elbow stiffness    Plan  We discussed the findings in detail.  Because his range of motion is so dramatically limited I recommend right shoulder manipulation with arthroscopic lysis of adhesions.    We discussed the benefits of surgical intervention, as well as alternative treatments.  Potential surgical risks and complications include but are not limited to bleeding, infection, failure of repaired structures to heal biologically, stiffness, recurrence, nerve or vascular injury, residual pain, and the possibility of revision surgery and prolonged convalescence.  Sufficient opportunity was given to discuss the condition and treatment plan with the doctor, and all questions were answered for the patient.

## 2017-09-14 ENCOUNTER — ANESTHESIA (OUTPATIENT)
Dept: PERIOP | Facility: HOSPITAL | Age: 62
End: 2017-09-14

## 2017-09-14 ENCOUNTER — HOSPITAL ENCOUNTER (OUTPATIENT)
Facility: HOSPITAL | Age: 62
Setting detail: HOSPITAL OUTPATIENT SURGERY
Discharge: HOME | End: 2017-09-14
Attending: ORTHOPAEDIC SURGERY | Admitting: ORTHOPAEDIC SURGERY

## 2017-09-14 ENCOUNTER — ANESTHESIA EVENT (OUTPATIENT)
Dept: PERIOP | Facility: HOSPITAL | Age: 62
End: 2017-09-14

## 2017-09-14 VITALS
BODY MASS INDEX: 28.63 KG/M2 | TEMPERATURE: 97.4 F | WEIGHT: 217 LBS | RESPIRATION RATE: 16 BRPM | HEART RATE: 64 BPM | OXYGEN SATURATION: 98 % | DIASTOLIC BLOOD PRESSURE: 92 MMHG | SYSTOLIC BLOOD PRESSURE: 132 MMHG

## 2017-09-14 DIAGNOSIS — G89.29 CHRONIC RIGHT SHOULDER PAIN: Primary | ICD-10-CM

## 2017-09-14 DIAGNOSIS — M25.511 CHRONIC RIGHT SHOULDER PAIN: Primary | ICD-10-CM

## 2017-09-14 PROCEDURE — 25010000002 TRIAMCINOLONE PER 10 MG: Performed by: ORTHOPAEDIC SURGERY

## 2017-09-14 PROCEDURE — 25010000002 EPINEPHRINE PER 0.1 MG: Performed by: ORTHOPAEDIC SURGERY

## 2017-09-14 PROCEDURE — 25010000002 ONDANSETRON PER 1 MG: Performed by: NURSE ANESTHETIST, CERTIFIED REGISTERED

## 2017-09-14 PROCEDURE — 25010000002 MIDAZOLAM PER 1 MG: Performed by: ANESTHESIOLOGY

## 2017-09-14 PROCEDURE — 25010000002 DEXAMETHASONE PER 1 MG: Performed by: NURSE ANESTHETIST, CERTIFIED REGISTERED

## 2017-09-14 PROCEDURE — 25010000002 PROPOFOL 10 MG/ML EMULSION: Performed by: NURSE ANESTHETIST, CERTIFIED REGISTERED

## 2017-09-14 PROCEDURE — 25010000003 CEFAZOLIN IN DEXTROSE 2-4 GM/100ML-% SOLUTION: Performed by: ORTHOPAEDIC SURGERY

## 2017-09-14 PROCEDURE — 25010000002 FENTANYL CITRATE (PF) 100 MCG/2ML SOLUTION: Performed by: ANESTHESIOLOGY

## 2017-09-14 RX ORDER — BUPIVACAINE HYDROCHLORIDE 5 MG/ML
INJECTION, SOLUTION EPIDURAL; INTRACAUDAL AS NEEDED
Status: DISCONTINUED | OUTPATIENT
Start: 2017-09-14 | End: 2017-09-14 | Stop reason: SURG

## 2017-09-14 RX ORDER — FAMOTIDINE 10 MG/ML
20 INJECTION, SOLUTION INTRAVENOUS ONCE
Status: COMPLETED | OUTPATIENT
Start: 2017-09-14 | End: 2017-09-14

## 2017-09-14 RX ORDER — PROMETHAZINE HYDROCHLORIDE 25 MG/1
25 TABLET ORAL ONCE AS NEEDED
Status: DISCONTINUED | OUTPATIENT
Start: 2017-09-14 | End: 2017-09-14 | Stop reason: HOSPADM

## 2017-09-14 RX ORDER — HYDROCODONE BITARTRATE AND ACETAMINOPHEN 7.5; 325 MG/1; MG/1
1 TABLET ORAL ONCE AS NEEDED
Status: DISCONTINUED | OUTPATIENT
Start: 2017-09-14 | End: 2017-09-14 | Stop reason: HOSPADM

## 2017-09-14 RX ORDER — BUPIVACAINE HYDROCHLORIDE 5 MG/ML
INJECTION, SOLUTION PERINEURAL AS NEEDED
Status: DISCONTINUED | OUTPATIENT
Start: 2017-09-14 | End: 2017-09-14 | Stop reason: HOSPADM

## 2017-09-14 RX ORDER — FLUMAZENIL 0.1 MG/ML
0.2 INJECTION INTRAVENOUS AS NEEDED
Status: DISCONTINUED | OUTPATIENT
Start: 2017-09-14 | End: 2017-09-14 | Stop reason: HOSPADM

## 2017-09-14 RX ORDER — TRIAMCINOLONE ACETONIDE 40 MG/ML
INJECTION, SUSPENSION INTRA-ARTICULAR; INTRAMUSCULAR AS NEEDED
Status: DISCONTINUED | OUTPATIENT
Start: 2017-09-14 | End: 2017-09-14 | Stop reason: HOSPADM

## 2017-09-14 RX ORDER — MIDAZOLAM HYDROCHLORIDE 1 MG/ML
1 INJECTION INTRAMUSCULAR; INTRAVENOUS
Status: DISCONTINUED | OUTPATIENT
Start: 2017-09-14 | End: 2017-09-14 | Stop reason: HOSPADM

## 2017-09-14 RX ORDER — OXYCODONE HYDROCHLORIDE AND ACETAMINOPHEN 5; 325 MG/1; MG/1
1-2 TABLET ORAL EVERY 4 HOURS PRN
Qty: 40 TABLET | Refills: 0 | Status: SHIPPED | OUTPATIENT
Start: 2017-09-14 | End: 2018-02-27

## 2017-09-14 RX ORDER — LIDOCAINE HYDROCHLORIDE 10 MG/ML
0.5 INJECTION, SOLUTION EPIDURAL; INFILTRATION; INTRACAUDAL; PERINEURAL ONCE AS NEEDED
Status: COMPLETED | OUTPATIENT
Start: 2017-09-14 | End: 2017-09-14

## 2017-09-14 RX ORDER — LABETALOL HYDROCHLORIDE 5 MG/ML
5 INJECTION, SOLUTION INTRAVENOUS
Status: DISCONTINUED | OUTPATIENT
Start: 2017-09-14 | End: 2017-09-14 | Stop reason: HOSPADM

## 2017-09-14 RX ORDER — NALOXONE HCL 0.4 MG/ML
0.2 VIAL (ML) INJECTION AS NEEDED
Status: DISCONTINUED | OUTPATIENT
Start: 2017-09-14 | End: 2017-09-14 | Stop reason: HOSPADM

## 2017-09-14 RX ORDER — DIPHENHYDRAMINE HYDROCHLORIDE 50 MG/ML
12.5 INJECTION INTRAMUSCULAR; INTRAVENOUS
Status: DISCONTINUED | OUTPATIENT
Start: 2017-09-14 | End: 2017-09-14 | Stop reason: HOSPADM

## 2017-09-14 RX ORDER — OXYCODONE AND ACETAMINOPHEN 7.5; 325 MG/1; MG/1
1 TABLET ORAL ONCE AS NEEDED
Status: DISCONTINUED | OUTPATIENT
Start: 2017-09-14 | End: 2017-09-14 | Stop reason: HOSPADM

## 2017-09-14 RX ORDER — SODIUM CHLORIDE, SODIUM LACTATE, POTASSIUM CHLORIDE, CALCIUM CHLORIDE 600; 310; 30; 20 MG/100ML; MG/100ML; MG/100ML; MG/100ML
9 INJECTION, SOLUTION INTRAVENOUS CONTINUOUS
Status: DISCONTINUED | OUTPATIENT
Start: 2017-09-14 | End: 2017-09-14 | Stop reason: HOSPADM

## 2017-09-14 RX ORDER — PROMETHAZINE HYDROCHLORIDE 25 MG/1
25 SUPPOSITORY RECTAL ONCE AS NEEDED
Status: DISCONTINUED | OUTPATIENT
Start: 2017-09-14 | End: 2017-09-14 | Stop reason: HOSPADM

## 2017-09-14 RX ORDER — PROMETHAZINE HYDROCHLORIDE 25 MG/1
12.5 TABLET ORAL ONCE AS NEEDED
Status: DISCONTINUED | OUTPATIENT
Start: 2017-09-14 | End: 2017-09-14 | Stop reason: HOSPADM

## 2017-09-14 RX ORDER — FENTANYL CITRATE 50 UG/ML
50 INJECTION, SOLUTION INTRAMUSCULAR; INTRAVENOUS
Status: DISCONTINUED | OUTPATIENT
Start: 2017-09-14 | End: 2017-09-14 | Stop reason: HOSPADM

## 2017-09-14 RX ORDER — LIDOCAINE HYDROCHLORIDE AND EPINEPHRINE 20; 5 MG/ML; UG/ML
INJECTION, SOLUTION EPIDURAL; INFILTRATION; INTRACAUDAL; PERINEURAL AS NEEDED
Status: DISCONTINUED | OUTPATIENT
Start: 2017-09-14 | End: 2017-09-14 | Stop reason: SURG

## 2017-09-14 RX ORDER — HYDRALAZINE HYDROCHLORIDE 20 MG/ML
5 INJECTION INTRAMUSCULAR; INTRAVENOUS
Status: DISCONTINUED | OUTPATIENT
Start: 2017-09-14 | End: 2017-09-14 | Stop reason: HOSPADM

## 2017-09-14 RX ORDER — PROPOFOL 10 MG/ML
VIAL (ML) INTRAVENOUS AS NEEDED
Status: DISCONTINUED | OUTPATIENT
Start: 2017-09-14 | End: 2017-09-14 | Stop reason: SURG

## 2017-09-14 RX ORDER — SODIUM CHLORIDE 0.9 % (FLUSH) 0.9 %
1-10 SYRINGE (ML) INJECTION AS NEEDED
Status: DISCONTINUED | OUTPATIENT
Start: 2017-09-14 | End: 2017-09-14 | Stop reason: HOSPADM

## 2017-09-14 RX ORDER — MIDAZOLAM HYDROCHLORIDE 1 MG/ML
2 INJECTION INTRAMUSCULAR; INTRAVENOUS
Status: DISCONTINUED | OUTPATIENT
Start: 2017-09-14 | End: 2017-09-14 | Stop reason: HOSPADM

## 2017-09-14 RX ORDER — EPHEDRINE SULFATE 50 MG/ML
5 INJECTION, SOLUTION INTRAVENOUS ONCE AS NEEDED
Status: DISCONTINUED | OUTPATIENT
Start: 2017-09-14 | End: 2017-09-14 | Stop reason: HOSPADM

## 2017-09-14 RX ORDER — HYDROMORPHONE HYDROCHLORIDE 1 MG/ML
0.5 INJECTION, SOLUTION INTRAMUSCULAR; INTRAVENOUS; SUBCUTANEOUS
Status: DISCONTINUED | OUTPATIENT
Start: 2017-09-14 | End: 2017-09-14 | Stop reason: HOSPADM

## 2017-09-14 RX ORDER — ONDANSETRON 2 MG/ML
4 INJECTION INTRAMUSCULAR; INTRAVENOUS ONCE AS NEEDED
Status: DISCONTINUED | OUTPATIENT
Start: 2017-09-14 | End: 2017-09-14 | Stop reason: HOSPADM

## 2017-09-14 RX ORDER — LIDOCAINE HYDROCHLORIDE 20 MG/ML
INJECTION, SOLUTION INFILTRATION; PERINEURAL AS NEEDED
Status: DISCONTINUED | OUTPATIENT
Start: 2017-09-14 | End: 2017-09-14 | Stop reason: SURG

## 2017-09-14 RX ORDER — PROMETHAZINE HYDROCHLORIDE 25 MG/ML
12.5 INJECTION, SOLUTION INTRAMUSCULAR; INTRAVENOUS ONCE AS NEEDED
Status: DISCONTINUED | OUTPATIENT
Start: 2017-09-14 | End: 2017-09-14 | Stop reason: HOSPADM

## 2017-09-14 RX ORDER — ONDANSETRON 2 MG/ML
INJECTION INTRAMUSCULAR; INTRAVENOUS AS NEEDED
Status: DISCONTINUED | OUTPATIENT
Start: 2017-09-14 | End: 2017-09-14 | Stop reason: SURG

## 2017-09-14 RX ORDER — DEXAMETHASONE SODIUM PHOSPHATE 10 MG/ML
INJECTION INTRAMUSCULAR; INTRAVENOUS AS NEEDED
Status: DISCONTINUED | OUTPATIENT
Start: 2017-09-14 | End: 2017-09-14 | Stop reason: SURG

## 2017-09-14 RX ADMIN — SODIUM CHLORIDE, POTASSIUM CHLORIDE, SODIUM LACTATE AND CALCIUM CHLORIDE: 600; 310; 30; 20 INJECTION, SOLUTION INTRAVENOUS at 12:40

## 2017-09-14 RX ADMIN — FENTANYL CITRATE 50 MCG: 50 INJECTION INTRAMUSCULAR; INTRAVENOUS at 11:17

## 2017-09-14 RX ADMIN — DEXAMETHASONE SODIUM PHOSPHATE 4 MG: 10 INJECTION INTRAMUSCULAR; INTRAVENOUS at 12:45

## 2017-09-14 RX ADMIN — DEXAMETHASONE SODIUM PHOSPHATE 4 MG: 10 INJECTION INTRAMUSCULAR; INTRAVENOUS at 13:23

## 2017-09-14 RX ADMIN — SODIUM CHLORIDE, POTASSIUM CHLORIDE, SODIUM LACTATE AND CALCIUM CHLORIDE 9 ML/HR: 600; 310; 30; 20 INJECTION, SOLUTION INTRAVENOUS at 10:00

## 2017-09-14 RX ADMIN — LIDOCAINE HYDROCHLORIDE 60 MG: 20 INJECTION, SOLUTION INFILTRATION; PERINEURAL at 12:45

## 2017-09-14 RX ADMIN — ONDANSETRON 4 MG: 2 INJECTION INTRAMUSCULAR; INTRAVENOUS at 13:23

## 2017-09-14 RX ADMIN — LIDOCAINE HYDROCHLORIDE 0.5 ML: 10 INJECTION, SOLUTION EPIDURAL; INFILTRATION; INTRACAUDAL; PERINEURAL at 10:00

## 2017-09-14 RX ADMIN — MIDAZOLAM 2 MG: 1 INJECTION INTRAMUSCULAR; INTRAVENOUS at 11:07

## 2017-09-14 RX ADMIN — MIDAZOLAM 2 MG: 1 INJECTION INTRAMUSCULAR; INTRAVENOUS at 11:21

## 2017-09-14 RX ADMIN — BUPIVACAINE HYDROCHLORIDE 15 ML: 5 INJECTION, SOLUTION EPIDURAL; INTRACAUDAL; PERINEURAL at 11:20

## 2017-09-14 RX ADMIN — CEFAZOLIN SODIUM 2 G: 2 INJECTION, SOLUTION INTRAVENOUS at 12:43

## 2017-09-14 RX ADMIN — LIDOCAINE HYDROCHLORIDE AND EPINEPHRINE 5 ML: 20; 5 INJECTION, SOLUTION EPIDURAL; INFILTRATION; INTRACAUDAL; PERINEURAL at 11:20

## 2017-09-14 RX ADMIN — PROPOFOL 200 MG: 10 INJECTION, EMULSION INTRAVENOUS at 12:45

## 2017-09-14 RX ADMIN — FAMOTIDINE 20 MG: 10 INJECTION, SOLUTION INTRAVENOUS at 11:01

## 2017-09-14 NOTE — ANESTHESIA POSTPROCEDURE EVALUATION
Patient: Uriellewis Garcia    Procedure Summary     Date Anesthesia Start Anesthesia Stop Room / Location    09/14/17 1240 1343  [facility]  /  [facility]       Procedure Diagnosis Surgeon Provider    RIGHT SHOULDER MANIPULATION (Right ); ARTHROSCOPY LYSIS OF ADHESIONS AND BURSECTOMY RIGHT SHOULDER (Right Shoulder) No diagnosis on file. MD Quincy Lucero MD          Anesthesia Type: general, regional  Last vitals  BP   115/81 (09/14/17 1355)    Temp   36.3 °C (97.4 °F) (09/14/17 1341)    Pulse   63 (09/14/17 1350)   Resp   20 (09/14/17 1355)    SpO2   97 % (09/14/17 1350)      Post Anesthesia Care and Evaluation    Patient location during evaluation: bedside  Patient participation: complete - patient participated  Level of consciousness: awake  Pain score: 2  Pain management: adequate  Airway patency: patent  Anesthetic complications: No anesthetic complications  PONV Status: none  Cardiovascular status: acceptable  Respiratory status: acceptable  Hydration status: acceptable    Comments: /81  Pulse 63  Temp 36.3 °C (97.4 °F) (Temporal Artery )   Resp 20  Wt 217 lb (98.4 kg)  SpO2 97%  BMI 28.63 kg/m2

## 2017-09-14 NOTE — PLAN OF CARE
Problem: Perioperative Period (Adult)  Goal: Signs and Symptoms of Listed Potential Problems Will be Absent or Manageable (Perioperative Period)  Outcome: Outcome(s) achieved Date Met:  09/14/17 09/14/17 1445   Perioperative Period   Problems Assessed (Perioperative Period) all   Problems Present (Perioperative Period) none

## 2017-09-14 NOTE — PLAN OF CARE
Problem: Patient Care Overview (Adult)  Goal: Plan of Care Review  Outcome: Ongoing (interventions implemented as appropriate)    09/14/17 1418   Coping/Psychosocial Response Interventions   Plan Of Care Reviewed With patient   Patient Care Overview   Progress improving   Outcome Evaluation   Outcome Summary/Follow up Plan peripheral nerve block working, no pain or nausea, tolerating po fluids.       Goal: Adult Individualization and Mutuality  Outcome: Ongoing (interventions implemented as appropriate)  Goal: Discharge Needs Assessment  Outcome: Ongoing (interventions implemented as appropriate)    09/14/17 1418   Discharge Needs Assessment   Concerns To Be Addressed no discharge needs identified         Problem: Perioperative Period (Adult)  Goal: Signs and Symptoms of Listed Potential Problems Will be Absent or Manageable (Perioperative Period)  Outcome: Ongoing (interventions implemented as appropriate)    09/14/17 1418   Perioperative Period   Problems Assessed (Perioperative Period) all   Problems Present (Perioperative Period) none

## 2017-09-14 NOTE — PLAN OF CARE
Problem: Patient Care Overview (Adult)  Goal: Plan of Care Review  Outcome: Outcome(s) achieved Date Met:  09/14/17 09/14/17 1445   Coping/Psychosocial Response Interventions   Plan Of Care Reviewed With patient;spouse   Patient Care Overview   Progress improving       Goal: Discharge Needs Assessment  Outcome: Outcome(s) achieved Date Met:  09/14/17 09/14/17 1445   Discharge Needs Assessment   Concerns To Be Addressed denies needs/concerns at this time

## 2017-09-14 NOTE — ANESTHESIA PREPROCEDURE EVALUATION
Anesthesia Evaluation     Patient summary reviewed and Nursing notes reviewed   no history of anesthetic complications:  NPO Solid Status: > 8 hours  NPO Liquid Status: > 2 hours     Airway   Mallampati: II  TM distance: >3 FB  Neck ROM: full  Dental - normal exam     Pulmonary - negative pulmonary ROS and normal exam   Cardiovascular - normal exam    ECG reviewed    (+) hyperlipidemia      Neuro/Psych  (+) psychiatric history Anxiety,    GI/Hepatic/Renal/Endo - negative ROS     Musculoskeletal     Abdominal    Substance History      OB/GYN          Other   (+) arthritis                                   Anesthesia Plan    ASA 2     general and regional     Anesthetic plan and risks discussed with patient.

## 2017-09-14 NOTE — ANESTHESIA PROCEDURE NOTES
Airway  Urgency: elective    Date/Time: 9/14/2017 12:47 PM  Airway not difficult    General Information and Staff    Patient location during procedure: OR  Anesthesiologist: EMELY ARREGUIN  CRNA: SUNG LINARES    Indications and Patient Condition  Indications for airway management: airway protection    Preoxygenated: yes  MILS not maintained throughout  Mask difficulty assessment: 1 - vent by mask    Final Airway Details  Final airway type: supraglottic airway      Successful airway: classic  Size 5    Number of attempts at approach: 1    Additional Comments  Preoxygenation FEO2 >85, SIVI, LMA placed with ease, teeth/lips as preop. Secured and placement confirmed.

## 2017-09-14 NOTE — OP NOTE
MANIPULATION OF, SHOULDER ARTHROSCOPY  Procedure Note    Uriel Garcia  9/14/2017    Pre-op Diagnosis:   Adhesive capsulitis right shoulder  Post-op Diagnosis:     Adhesive capsulitis right shoulder  Bursitis right shoulder    Procedure(s):  RIGHT SHOULDER MANIPULATION  ARTHROSCOPY WITH LYSIS OF ADHESIONS RIGHT SHOULDER  ARTHROSCOPIC BURSECTOMY RIGHT SHOULDER    Surgeon(s):  Jann Barger MD    Anesthesia: General with Block  Anesthesiologist: Quincy Kellogg MD  CRNA: Dinah Sterling CRNA    Staff:   Circulator: Sharon Clark RN  Scrub Person: Ishmaelarchana Castrok        Estimated Blood Loss: Minimal    Specimens:                * No orders in the log *    Complications: None    Procedure: The patient was taken to the operative suite.  After adequate anesthesia was established the right upper extremity was ranged.  He had restricted motion in all planes and a manipulation was carried out with audible and palpable release of adhesions.  He was then sterilely prepped and draped the patient in a beachchair position.  A posterior portal was made.  The arthroscope was introduced into the glenohumeral joint.  An anterior portal was made in the rotator interval and diagnostic arthroscopy commenced.  There is synovitic reactive tissue in the joint.  Debridement cauterization was carried out.  Glenohumeral articular cartilage biceps tendon and rotator insertions were intact.  The capsule was debrided along the area with a manipulation had released the capsule and this was debrided along its length anteriorly and inferiorly and posterior inferiorly.  Once this was completely vigorous irrigation suction with warm.  The arthroscope was placed in the subacromial space and a lateral working portal was made there was significant hypertrophy of the bursa and a thickened coracoacromial ligament.  A bursectomy is performed along with coracoacromial ligament release.  There is no significant bony spur.  Good clearance is  noted.  Instruments removed.  The portals were closed with 4-0 nylon.  A sterile compression dressing was applied.  Prior to applying a 40 mg Kenalog intra-articular sterilely.  She was awoken taken to the postanesthetic recovery unit in good condition.      Jann Barger MD     Date: 9/14/2017  Time: 1:31 PM

## 2017-09-14 NOTE — PLAN OF CARE
Problem: Patient Care Overview (Adult)  Goal: Plan of Care Review  Outcome: Ongoing (interventions implemented as appropriate)    09/14/17 0933   Coping/Psychosocial Response Interventions   Plan Of Care Reviewed With patient;family   Patient Care Overview   Progress improving       Goal: Discharge Needs Assessment  Outcome: Ongoing (interventions implemented as appropriate)    09/14/17 0933   Discharge Needs Assessment   Concerns To Be Addressed denies needs/concerns at this time         Problem: Perioperative Period (Adult)  Goal: Signs and Symptoms of Listed Potential Problems Will be Absent or Manageable (Perioperative Period)  Outcome: Ongoing (interventions implemented as appropriate)    09/14/17 0933   Perioperative Period   Problems Assessed (Perioperative Period) all   Problems Present (Perioperative Period) pain

## 2017-09-14 NOTE — ANESTHESIA PROCEDURE NOTES
Peripheral Block    Patient location during procedure: holding area  Start time: 9/14/2017 11:14 AM  Stop time: 9/14/2017 11:30 AM  Reason for block: at surgeon's request and post-op pain management  Performed by  Anesthesiologist: TODD POLK  Preanesthetic Checklist  Completed: patient identified, site marked, surgical consent, pre-op evaluation, timeout performed, IV checked, risks and benefits discussed and monitors and equipment checked  Prep:  Sterile barriers:cap, gloves, mask and sterile barriers  Prep: ChloraPrep  Patient monitoring: blood pressure monitoring, continuous pulse oximetry and EKG  Procedure  Sedation:yes    Guidance:ultrasound guided  ULTRASOUND INTERPRETATION. Using ultrasound guidance a 20 G gauge needle was placed in close proximity to the nerve, at which point, under ultrasound guidance anesthetic was injected in the area of the nerve and spread of the anesthesia was seen on ultrasound in close proximity thereto.  There were no abnormalities seen on ultrasound; a digital image was taken; and the patient tolerated the procedure with no complications. Images:still images obtained    Laterality:right  Block Type:interscalene  Injection Technique:single-shot  Needle Type:echogenic  Needle Gauge:20 G    Medications  Depomedrol:80  Local Injected:bupivacaine 0.5% and lidocaine 2% with epinephrine Local Amount Injected:20mL  Post Assessment  Injection Assessment: negative aspiration for heme, no paresthesia on injection and incremental injection  Patient Tolerance:comfortable throughout block  Complications:no

## 2017-09-15 ENCOUNTER — HOSPITAL ENCOUNTER (OUTPATIENT)
Dept: PHYSICAL THERAPY | Facility: HOSPITAL | Age: 62
Setting detail: THERAPIES SERIES
Discharge: HOME | End: 2017-09-15
Attending: ORTHOPAEDIC SURGERY

## 2017-09-15 ENCOUNTER — APPOINTMENT (OUTPATIENT)
Dept: SLEEP MEDICINE | Facility: HOSPITAL | Age: 62
End: 2017-09-15

## 2017-09-15 DIAGNOSIS — Z74.09 IMPAIRED MOBILITY: ICD-10-CM

## 2017-09-15 DIAGNOSIS — M25.511 RIGHT SHOULDER PAIN, UNSPECIFIED CHRONICITY: Primary | ICD-10-CM

## 2017-09-15 DIAGNOSIS — Z47.89 ORTHOPEDIC AFTERCARE: ICD-10-CM

## 2017-09-15 PROCEDURE — 97161 PT EVAL LOW COMPLEX 20 MIN: CPT | Performed by: PHYSICAL THERAPIST

## 2017-09-15 PROCEDURE — 97140 MANUAL THERAPY 1/> REGIONS: CPT | Performed by: PHYSICAL THERAPIST

## 2017-09-15 NOTE — THERAPY EVALUATION
Outpatient Physical Therapy Ortho Initial Evaluation  [facility]     Patient Name: Uriel Garcia  : 1955  MRN: [id number]  Today's Date: 9/15/2017      Visit Date: 09/15/2017    Patient Active Problem List   Diagnosis   • Anxiety   • Hyperlipidemia   • Right elbow pain   • Healthcare maintenance   • Right knee pain   • Chronic pain of left heel   • Chronic right shoulder pain   • Decreased range of motion of right shoulder   • Decreased range of motion of right elbow   • Erectile dysfunction        Past Medical History:   Diagnosis Date   • Anxiety    • Arthritis    • Benign colonic polyp    • BPPV (benign paroxysmal positional vertigo) 10/20/2016   • Hyperlipidemia     was on higher dose of Lipitor in past but had elevated LFTs. Added Zetia and now in good control   • Limited joint range of motion     RT SHOULDER   • Sleep apnea     uses CPAP        Past Surgical History:   Procedure Laterality Date   • COLONOSCOPY      Dr King/SHAUN   • HERNIA REPAIR Right     Dr Garnett/SHAUN   • JOINT MANIPULATION Right 2017    Procedure: RIGHT SHOULDER MANIPULATION;  Surgeon: Jann Barger MD;  [place] [facility] [place] [facility] [place]  Service:    • SHOULDER ARTHROSCOPY Right 2017    Procedure: ARTHROSCOPY LYSIS OF ADHESIONS AND BURSECTOMY RIGHT SHOULDER;  Surgeon: Jann Barger MD;  Location: [facility] [place] [facility] [place];  Service:        Visit Dx:     ICD-10-CM ICD-9-CM   1. Right shoulder pain, unspecified chronicity M25.511 719.41   2. Orthopedic aftercare Z47.89 V54.9   3. Impaired mobility Z74.09 799.89             Patient History       09/15/17 1300          History    Chief Complaint Difficulty with daily activities;Joint stiffness;Pain  -GJ      Type of Pain Shoulder pain   R  -GJ      Date Current Problem(s) Began --   3/15/2017, manipulation 17  -GJ      Brief Description of Current Complaint Mr. Garcia is a 60 y/o R handed male.  He reports difficulty with ROM of his R shoulder since 3/2017, apparently  insidiously, however, questions rather R elbow issues are related.  He underwent a R shoulder manipulation yesterday (9/14/17) and capsular lysis (bursectomy).   He had a block which is wearing off but not completely yet.  He works at a computer, enjoys tennis/golf and is currently unable to perform these activities (was unable prior to procedure secondary to limitations in R shoulder).   -GJ      Previous treatment for THIS PROBLEM Surgery   manipulation  -GJ      Onset Date- PT 09/15/2017  -GJ      Surgery Date: 09/14/17  -GJ      Patient/Caregiver Goals Relieve pain;Return to prior level of function;Return to work;Improve mobility;Improve strength;Know what to do to help the symptoms  -GJ      Hand Dominance right-handed  -GJ      Occupation/sports/leisure activities [employer]/computer work, tennis, golf  -GJ      Pain     Pain Location Shoulder   R  -GJ      Pain at Present 0  -GJ      Pain at Best 7;0  -GJ      Pain at Worst 7  -GJ      What Performance Factors Make the Current Problem(s) WORSE? moving arm  -GJ      Daily Activities    Primary Language English  -GJ      How does patient learn best? Listening;Reading  -GJ      Teaching needs identified Home Exercise Program;Management of Condition  -GJ      Barriers to learning None  -GJ      Pt Participated in POC and Goals Yes  -GJ      Safety    Are you being hurt, hit, or frightened by anyone at home or in your life? No  -GJ      Are you being neglected by a caregiver No  -GJ        User Key  (r) = Recorded By, (t) = Taken By, (c) = Cosigned By    Initials Name Provider Type    GONZALEZ Ruiz, PT Physical Therapist                PT Ortho       09/15/17 0900    Posture/Observations    Alignment Options Forward head;Scapular elevation;Scapular winging  -GJ    Forward Head Mild  -GJ    Scapular Elevation Right:;Moderate  -GJ    Scapular winging Right:;Moderate  -GJ    Quarter Clearing    Quarter Clearing Upper Quarter Clearing  -GJ    Myotomal Screen- Upper  Quarter Clearing    Elbow flexion/wrist extension (C6) Left:;5 (Normal);Right:;4 (Good)  -GJ    Elbow extension/wrist flexion (C7) Left:;5 (Normal);Right:;4 (Good)  -GJ    Cervical/Shoulder ROM Screen    Cervical flexion Normal  -GJ    Cervical extension Normal  -GJ    Cervical rotation Normal  -GJ    ROM (Range of Motion)    General ROM upper extremity range of motion deficits identified  -GJ    Left Shoulder    Flexion AROM Deficit 150, seated  -GJ    Flexion PROM Deficit 175, supine  -GJ    ABduction AROM Deficit 160, seated  -GJ    ABduction PROM Deficit 180 supine  -GJ    External Rotation PROM Deficit 85 supine, POS, 90 abd  -GJ    Internal Rotation AROM Deficit FIR midline L 1  -GJ    Internal Rotation PROM Deficit 65, supine POS, 90 abd  -GJ    Right Shoulder    Flexion AROM Deficit 110, seated  -GJ    Flexion PROM Deficit 165, supine  -GJ    ABduction AROM Deficit 80, seated  -GJ    ABduction PROM Deficit 175 supine  -GJ    External Rotation PROM Deficit 65, supine POS, 45 abed  -GJ    Internal Rotation AROM Deficit FIR ipsilateral hip pocket  -GJ    Internal Rotation PROM Deficit 65, supine POS, 45 abd  -GJ    General UE Assessment    ROM shoulder, left: UE ROM deficit;shoulder, right: UE ROM deficit  -GJ    MMT (Manual Muscle Testing)    General MMT Assessment upper extremity strength deficits identified  -GJ    Left Shoulder    Flexion Gross Movement (5/5) normal  -GJ    ABduction Gross Movement (5/5) normal  -GJ    Int Rotation Gross Movement (5/5) normal  -GJ    Ext Rotation Gross Movement (5/5) normal  -GJ    Right Shoulder    Flexion Gross Movement --   NT  -GJ    ABduction Gross Movement --   NT  -GJ    Int Rotation Gross Movement --   NT  -GJ    Ext Rotation Gross Movement --   NT  -GJ    Upper Extremity    Upper Ext Manual Muscle Testing left shoulder strength deficit;right shoulder strength deficit  -GJ    Flexibility    Flexibility Tested? Upper Extremity  -GJ    Upper Extremity Flexibility     Upper Trapezius Right:;Moderately limited  -GJ    Levator Scapula Right:;Moderately limited  -GJ    Pect Minor Right:;Moderately limited  -GJ    Pect Major Left:;Moderately limited  -GJ    Latissimus Dorsi Right:;Moderately limited  -GJ      User Key  (r) = Recorded By, (t) = Taken By, (c) = Cosigned By    Initials Name Provider Type    GONZALEZ Ruiz PT Physical Therapist                            Therapy Education       09/15/17 1520          Therapy Education    Education Details discussed anatomy of the shoulder and physiology of healing particularly following his procedure (used model), discussed vital importance of maintaining ROM early with transition to strengthening new ranges later.  Educated wife how to perform PROM and she demonstrated adequate ability to perform.  pt. to perform ROM every 2 hours initially.  Discussed icing following ranging.    -GJ      Given HEP;Symptoms/condition management;Pain management;Posture/body mechanics;Mobility training;Edema management  -GJ      Program New  -GJ      How Provided Verbal;Demonstration;Written  -GJ      Provided to Patient;Caregiver  -GJ      Level of Understanding Teach back education performed;Verbalized;Demonstrated  -GJ        User Key  (r) = Recorded By, (t) = Taken By, (c) = Cosigned By    Initials Name Provider Type    GONZALEZ Ruiz PT Physical Therapist                PT OP Goals       09/15/17 1300       PT Short Term Goals    STG Date to Achieve 09/29/17  -GJ     STG 1 pt. to be I with initial HEP to facilitate self management of their condition  -GJ     STG 1 Progress New  -GJ     STG 2 pt. to be educated in/verbalize understanding of the importance of posture/ergonomics in association with their condition to facilitate self management of their condition  -GJ     STG 2 Progress New  -GJ     Long Term Goals    LTG Date to Achieve 10/13/17  -GJ     LTG 1 pt. to be I with advanced HEP to facilitate self management of their condition  -GJ      LTG 1 Progress New  -GJ     LTG 2 pt. to report a DASH </= 25% to demonstrate decreased level of perceived disability  -GJ     LTG 2 Progress New  -GJ     LTG 3 pt. to demonstrate R shoulder flexion AROM >/= 0-150 to facilitate ease of performing functional/household activities  -GJ     LTG 3 Progress New  -GJ     LTG 4 pt. to demonstrate R shoulder abd AROM >/= 0-140 to facilitate ease of performing functional/household activities  -GJ     LTG 4 Progress New  -GJ     LTG 5 pt. to demonstrate R shoulder AROM FIR >/= mid line L2/3 to facilitate ease of performing self care/dressing activities  -GJ     LTG 5 Progress New  -GJ     LTG 6 pt. to demonstrate placing/retrieving small object from an above the shoulder level shelf with his RUE to facilitate ease of performing household/work related activities  -GJ     LTG 6 Progress New  -GJ     Time Calculation    PT Goal Re-Cert Due Date 10/13/17  -       User Key  (r) = Recorded By, (t) = Taken By, (c) = Cosigned By    Initials Name Provider Type     Simon Ruiz, PT Physical Therapist                PT Assessment/Plan       09/15/17 1309       PT Assessment    Functional Limitations Performance in sport activities;Performance in work activities;Limitations in functional capacity and performance;Limitations in community activities;Performance in leisure activities;Performance in self-care ADL;Limitation in home management  -     Impairments Muscle strength;Pain;Poor body mechanics;Posture;Range of motion;Joint mobility;Impaired flexibility;Impaired muscle endurance;Impaired muscle length;Impaired muscle power  -     Assessment Comments Mr. Garcia is a 60 y/o R handed male.  He reports difficulty with ROM of his R shoulder since 3/2017, apparently insidiously, however, questions rather R elbow issues are related.  He underwent a R shoulder manipulation yesterday (9/14/17) and capsular lysis (bursectomy).   He had a block which is wearing off but not completely  yet.  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  Mr. Garcia presents to the clinic wearing a sling, demonstrates decreased A/PROM of the R shoulder, with poor glenohumeral rhythm.  He demonstrates increased tone R UT, levator, pec, lattisimus tissues.  He reports a DASH score of 89%, scored 0-100, 0 represents no perceived disability.  Mr. Garcia demonstrates s/s consistent following manipulation/capsular lysis/bursectomy) which limits his ability to participate in household, work, community, leisure, self care activities.  He will benefit from skilled physical therapy intervention to address the above impairments.    -GJ     Please refer to paper survey for additional self-reported information Yes  -GJ     Rehab Potential Good  -GJ     Patient/caregiver participated in establishment of treatment plan and goals Yes  -GJ     Patient would benefit from skilled therapy intervention Yes  -GJ     PT Plan    PT Frequency 5x/week;2x/week  -GJ     Predicted Duration of Therapy Intervention (days/wks) to see 5 days in a row, then BIW x 3-4 weeks   -GJ     Planned CPT's? PT EVAL LOW COMPLEXITY: 17462;PT RE-EVAL: 00955;PT THER PROC EA 15 MIN: 18160;PT MANUAL THERAPY EA 15 MIN: 02207;PT HOT OR COLD PACK TREAT MCARE;PT ELECTRICAL STIM UNATTEND: ;PT ULTRASOUND EA 15 MIN: 24146  -GJ     PT Plan Comments to see 5 days in a row, focus on ROM, possible warm up on UBE gently, pulleys, initiatte AAROM activiteis with cane, particularly FIR, mobs to R GHJ, PROM.  As appropriate transition to isometric exercises, and strengthening in new ranges.  PA mobs to T spine/scapula    -GJ       User Key  (r) = Recorded By, (t) = Taken By, (c) = Cosigned By    Initials Name Provider Type    GONZALEZ Ruiz, PT Physical Therapist                Modalities       09/15/17 0900          Ice    Ice Applied Yes   -GJ      Location R shoulder, pt. seated, RUE rested on pillow  -GJ      Rx Minutes 10 mins  -GJ      Ice S/P Rx Yes  -GJ        User Key  (r) = Recorded By, (t) = Taken By, (c) = Cosigned By    Initials Name Provider Type    GONZALEZ Ruiz PT Physical Therapist              Exercises       09/15/17 1500          Exercise 1    Exercise Name 1 shoulder shrugs  -GJ      Cueing 1 Demo  -GJ      Reps 1 15  -GJ      Exercise 2    Exercise Name 2 scap retraction  -GJ      Cueing 2 Demo  -GJ      Reps 2 15  -GJ      Exercise 3    Exercise Name 3 AAROM flexion in supine (clasping hands  -GJ      Cueing 3 Demo  -GJ      Reps 3 15  -GJ      Exercise 4    Exercise Name 4 PROM (with wife) to R shoulder into flexion, abd, ER, IR  -GJ      Cueing 4 Demo  -GJ      Reps 4 10  -GJ      Time (Seconds) 4 10  -GJ      Exercise 5    Exercise Name 5 pendulum for pain control  -GJ        User Key  (r) = Recorded By, (t) = Taken By, (c) = Cosigned By    Initials Name Provider Type    GONZALEZ Ruiz PT Physical Therapist           Manual Rx (last 36 hours)      Manual Treatments       09/15/17 1500          Manual Rx 1    Manual Rx 1 Location PA to R GHJ, lateral distraction R GHJ, pt. supine  -GJ      Manual Rx 2    Manual Rx 2 Location PROM R shoulder into flexion, abd, ER/IR, pt. supine  -GJ        User Key  (r) = Recorded By, (t) = Taken By, (c) = Cosigned By    Initials Name Provider Type    GONZALEZ Ruiz PT Physical Therapist                            Outcome Measures       09/15/17 0900          Functional Assessment    Outcome Measure Options Disabilities of the Arm, Shoulder, and Hand (DASH)   89%  -GJ        User Key  (r) = Recorded By, (t) = Taken By, (c) = Cosigned By    Initials Name Provider Type    GONZALEZ Ruiz PT Physical Therapist            Time Calculation:   Start Time: 0915  Stop Time: 1015  Time Calculation (min): 60 min     Therapy Charges for Today     Code Description Service Date Service  Provider Modifiers Qty    00911100932 HC PT HOT OR COLD PACK TREAT MCARE 9/15/2017 Simon Ruiz, PT GP 1    58150151353 HC PT MANUAL THERAPY EA 15 MIN 9/15/2017 Simon Ruiz, PT GP 2    72262676666 HC PT EVAL LOW COMPLEXITY 1 9/15/2017 Simon Ruiz, PT GP 1          PT G-Codes  Outcome Measure Options: Disabilities of the Arm, Shoulder, and Hand (DASH) (89%)         Simon Ruiz, PT  9/15/2017

## 2017-09-18 ENCOUNTER — HOSPITAL ENCOUNTER (OUTPATIENT)
Dept: PHYSICAL THERAPY | Facility: HOSPITAL | Age: 62
Setting detail: THERAPIES SERIES
Discharge: HOME | End: 2017-09-18

## 2017-09-18 DIAGNOSIS — Z74.09 IMPAIRED MOBILITY: ICD-10-CM

## 2017-09-18 DIAGNOSIS — M25.511 RIGHT SHOULDER PAIN, UNSPECIFIED CHRONICITY: Primary | ICD-10-CM

## 2017-09-18 DIAGNOSIS — Z47.89 ORTHOPEDIC AFTERCARE: ICD-10-CM

## 2017-09-18 PROCEDURE — 97162 PT EVAL MOD COMPLEX 30 MIN: CPT | Performed by: PHYSICAL THERAPIST

## 2017-09-18 PROCEDURE — 97110 THERAPEUTIC EXERCISES: CPT | Performed by: PHYSICAL THERAPIST

## 2017-09-18 PROCEDURE — 97140 MANUAL THERAPY 1/> REGIONS: CPT | Performed by: PHYSICAL THERAPIST

## 2017-09-18 NOTE — THERAPY TREATMENT NOTE
Outpatient Physical Therapy Ortho Treatment Note  [facility]     Patient Name: Uriel Garcia  : 1955  MRN: [id number]  Today's Date: 2017      Visit Date: 2017    Visit Dx:    ICD-10-CM ICD-9-CM   1. Right shoulder pain, unspecified chronicity M25.511 719.41   2. Orthopedic aftercare Z47.89 V54.9   3. Impaired mobility Z74.09 799.89       Patient Active Problem List   Diagnosis   • Anxiety   • Hyperlipidemia   • Right elbow pain   • Healthcare maintenance   • Right knee pain   • Chronic pain of left heel   • Chronic right shoulder pain   • Decreased range of motion of right shoulder   • Decreased range of motion of right elbow   • Erectile dysfunction        Past Medical History:   Diagnosis Date   • Anxiety    • Arthritis    • Benign colonic polyp    • BPPV (benign paroxysmal positional vertigo) 10/20/2016   • Hyperlipidemia     was on higher dose of Lipitor in past but had elevated LFTs. Added Zetia and now in good control   • Limited joint range of motion     RT SHOULDER   • Sleep apnea     uses CPAP        Past Surgical History:   Procedure Laterality Date   • COLONOSCOPY      Dr King/EBONY   • HERNIA REPAIR Right     Dr Garnett/EBONY   • JOINT MANIPULATION Right 2017    Procedure: RIGHT SHOULDER MANIPULATION;  Surgeon: Jann Barger MD;  Location: [facility] OR [facility] [place];  Service:    • SHOULDER ARTHROSCOPY Right 2017    Procedure: ARTHROSCOPY LYSIS OF ADHESIONS AND BURSECTOMY RIGHT SHOULDER;  Surgeon: Jann Barger MD;  Location: [facility] OR [facility] [place];  Service:              PT Ortho       17 0900    Right Shoulder    Flexion PROM Deficit 165 supine  -GJ    ABduction PROM Deficit 175 supine  -GJ    External Rotation PROM Deficit 80 supine, POS, 90 abd  -GJ    Internal Rotation PROM Deficit 75, supine POS, 90 abd  -GJ      User Key  (r) = Recorded By, (t) = Taken By, (c) = Cosigned By    Initials Name Provider Type    GONZALEZ Ruiz PT Physical Therapist                             PT Assessment/Plan       09/18/17 1056       PT Assessment    Assessment Comments Mr. Garcia is now 4 days s/p R shoulder manipulation/bursectomy. He demonstrates decreased gurading, reports decreaed paiin (is medicated today) and grossly demonstrates improved R shoulder AROM flexion/abduction with some UT compensation.  He is maintaining or improving with PROM in all planes.  Will be ready for some gentle strengthening next session.   -GJ     PT Plan    PT Plan Comments warm up on UBE, initiate scap stabilization exercises, shoulder ext, scap rows, standing IR/ER, FIR with cane with 1-2 #'s, contiue manual mobs, PROM. Add FIR with towel stretch   -GJ       User Key  (r) = Recorded By, (t) = Taken By, (c) = Cosigned By    Initials Name Provider Type    GONZALEZ Ruiz, NICKY Physical Therapist                Modalities       09/18/17 0900          Ice    Ice Applied Yes  -GJ      Location ice pack to R shoulder, pt. seated, RUE rested on pillow   -GJ      Rx Minutes 10 mins  -GJ      Ice S/P Rx Yes  -GJ        User Key  (r) = Recorded By, (t) = Taken By, (c) = Cosigned By    Initials Name Provider Type    GONZALEZ Ruiz, PT Physical Therapist                Exercises       09/18/17 0900          Subjective Comments    Subjective Comments Today is the best yet.    -GJ      Subjective Pain    Pre-Treatment Pain Level 1  -GJ      Exercise 6    Exercise Name 6 UBE  -GJ      Time (Minutes) 6 4  -GJ      Exercise 7    Exercise Name 7 Pulleys flex/abd  -GJ      Reps 7 20  -GJ      Exercise 8    Exercise Name 8 wall slide with towel   -GJ      Reps 8 20  -GJ        User Key  (r) = Recorded By, (t) = Taken By, (c) = Cosigned By    Initials Name Provider Type    GONZALEZ Ruiz, PT Physical Therapist                        Manual Rx (last 36 hours)      Manual Treatments       09/18/17 0900 09/17/17 0700       Manual Rx 1    Manual Rx 1 Location GH joint distraction/oscillations, PA to R GHJ,  lateral distraction R GHJ, pt. supine  -GJ GH joint distraction/oscillations, PA to R GHJ, lateral distraction R GHJ, pt. supine  -RA     Manual Rx 2    Manual Rx 2 Location PROM R shoulder into flexion, abd, ER/IR, pt. supine  -GJ PROM R shoulder into flexion, abd, ER/IR, pt. supine  -RA     Manual Rx 3    Manual Rx 3 Location  STM to UT, pecs, periscapular tissues, lateral shoulder telescoping to help with relaxation for PROM/gentle stretching.  Passive pec/UT stretching.  -RA     Manual Rx 4    Manual Rx 4 Location rhythmic stabilization R shoulder (shoulder flexed to 90, ER/IR in POS at 90 abd)  -GJ      Manual Rx 5    Manual Rx 5 Location PA mobs to T spine, pt. prone, nose in nose hole  -GJ      Manual Rx 5 Grade grade II, pt. apprehensive with mobs of T spine  -GJ      Manual Rx 6    Manual Rx 6 Location R scapular mobs, pt. in L SL (elevation/depression, protraction/retraction, lifting medial border)  -GJ        User Key  (r) = Recorded By, (t) = Taken By, (c) = Cosigned By    Initials Name Provider Type    RA Laine Levin, PT Physical Therapist    GJ Simon Ruiz, PT Physical Therapist                PT OP Goals       09/18/17 0900       PT Short Term Goals    STG Date to Achieve 09/29/17  -GJ     STG 1 pt. to be I with initial HEP to facilitate self management of their condition  -GJ     STG 1 Progress Met  -GJ     STG 2 pt. to be educated in/verbalize understanding of the importance of posture/ergonomics in association with their condition to facilitate self management of their condition  -GJ     STG 2 Progress Met  -GJ     Long Term Goals    LTG Date to Achieve 10/13/17  -GJ     LTG 1 pt. to be I with advanced HEP to facilitate self management of their condition  -GJ     LTG 1 Progress Ongoing  -GJ     LTG 2 pt. to report a DASH </= 25% to demonstrate decreased level of perceived disability  -GJ     LTG 2 Progress Ongoing  -GJ     LTG 3 pt. to demonstrate R shoulder flexion AROM >/= 0-150 to  facilitate ease of performing functional/household activities  -GJ     LTG 3 Progress Ongoing  -GJ     LTG 3 Progress Comments grossly 150 with some compensation  -GJ     LTG 4 pt. to demonstrate R shoulder abd AROM >/= 0-140 to facilitate ease of performing functional/household activities  -GJ     LTG 4 Progress Ongoing  -GJ     LTG 4 Progress Comments grossly 150 with some compensation  -GJ     LTG 5 pt. to demonstrate R shoulder AROM FIR >/= mid line L2/3 to facilitate ease of performing self care/dressing activities  -GJ     LTG 5 Progress Ongoing  -GJ     LTG 6 pt. to demonstrate placing/retrieving small object from an above the shoulder level shelf with his RUE to facilitate ease of performing household/work related activities  -GJ     LTG 6 Progress Ongoing  -GJ       User Key  (r) = Recorded By, (t) = Taken By, (c) = Cosigned By    Initials Name Provider Type    GONZALEZ Ruiz PT Physical Therapist                Therapy Education       09/18/17 1050          Therapy Education    Education Details to pull HEP back to 4-5 x's per day vs every 2 hours  -GJ      Given HEP;Symptoms/condition management;Pain management;Posture/body mechanics;Mobility training  -GJ      Program Reinforced  -GJ      How Provided Verbal  -GJ      Provided to Patient  -GJ      Level of Understanding Verbalized  -GJ        User Key  (r) = Recorded By, (t) = Taken By, (c) = Cosigned By    Initials Name Provider Type    GONZALEZ Ruiz PT Physical Therapist                Time Calculation:   Start Time: 0954  Stop Time: 1052  Time Calculation (min): 58 min    Therapy Charges for Today     Code Description Service Date Service Provider Modifiers Qty    50459956625 HC PT EVAL MOD COMPLEXITY 1 9/18/2017 Simon Ruiz, PT GP 1    43402925089 HC PT MANUAL THERAPY EA 15 MIN 9/18/2017 Simon Ruiz, PT GP 2    02055685536 HC PT THER PROC EA 15 MIN 9/18/2017 Simon Ruiz, PT GP 1                    Simon Ruiz  PT  9/18/2017

## 2017-09-19 ENCOUNTER — HOSPITAL ENCOUNTER (OUTPATIENT)
Dept: PHYSICAL THERAPY | Facility: HOSPITAL | Age: 62
Setting detail: THERAPIES SERIES
Discharge: HOME | End: 2017-09-19

## 2017-09-19 DIAGNOSIS — M25.511 RIGHT SHOULDER PAIN, UNSPECIFIED CHRONICITY: ICD-10-CM

## 2017-09-19 DIAGNOSIS — Z47.89 ORTHOPEDIC AFTERCARE: Primary | ICD-10-CM

## 2017-09-19 DIAGNOSIS — Z74.09 IMPAIRED MOBILITY: ICD-10-CM

## 2017-09-19 PROCEDURE — 97140 MANUAL THERAPY 1/> REGIONS: CPT | Performed by: PHYSICAL THERAPIST

## 2017-09-19 PROCEDURE — 97110 THERAPEUTIC EXERCISES: CPT | Performed by: PHYSICAL THERAPIST

## 2017-09-19 NOTE — THERAPY TREATMENT NOTE
Outpatient Physical Therapy Ortho Treatment Note  [facility]     Patient Name: rUiel Garcia  : 1955  MRN: [id number]  Today's Date: 2017      Visit Date: 2017    Visit Dx:    ICD-10-CM ICD-9-CM   1. Orthopedic aftercare Z47.89 V54.9   2. Right shoulder pain, unspecified chronicity M25.511 719.41   3. Impaired mobility Z74.09 799.89       Patient Active Problem List   Diagnosis   • Anxiety   • Hyperlipidemia   • Right elbow pain   • Healthcare maintenance   • Right knee pain   • Chronic pain of left heel   • Chronic right shoulder pain   • Decreased range of motion of right shoulder   • Decreased range of motion of right elbow   • Erectile dysfunction        Past Medical History:   Diagnosis Date   • Anxiety    • Arthritis    • Benign colonic polyp    • BPPV (benign paroxysmal positional vertigo) 10/20/2016   • Hyperlipidemia     was on higher dose of Lipitor in past but had elevated LFTs. Added Zetia and now in good control   • Limited joint range of motion     RT SHOULDER   • Sleep apnea     uses CPAP        Past Surgical History:   Procedure Laterality Date   • COLONOSCOPY      Dr King/EBONY   • HERNIA REPAIR Right     Dr Garnett/EBONY   • JOINT MANIPULATION Right 2017    Procedure: RIGHT SHOULDER MANIPULATION;  Surgeon: Jann Barger MD;  Location: [facility] OR [facility] [place];  Service:    • SHOULDER ARTHROSCOPY Right 2017    Procedure: ARTHROSCOPY LYSIS OF ADHESIONS AND BURSECTOMY RIGHT SHOULDER;  Surgeon: Jann Barger MD;  Location: [facility] OR [facility] [place];  Service:              PT Ortho       17 0900    Right Shoulder    Flexion PROM Deficit 165 supine  -GJ    ABduction PROM Deficit 175 supine  -GJ    External Rotation PROM Deficit 80 supine, POS, 90 abd  -GJ    Internal Rotation PROM Deficit 75, supine POS, 90 abd  -GJ      User Key  (r) = Recorded By, (t) = Taken By, (c) = Cosigned By    Initials Name Provider Type    GONZALEZ Ruiz PT Physical Therapist                             PT Assessment/Plan       09/19/17 1964       PT Assessment    Assessment Comments Mr. Marcial is 5 days s/p R shoulder maniupulation/bursectomy.  He was able to tolerate treatment today without pain medication.  He continues to demonstrate excellent PROM in all planes.  We initiated RC strengthening activities today (not to be completed at home yet).  Will slowly begin introduction of strengthening activities, cautious not to over do it given bursectomy procedure.    -GJ     PT Plan    PT Plan Comments warm up on UBE, add scap retraction with T band, shoulder ext with T band, add 1-2#'s to FIR AAROM, manual, prone shoulder ext, possible supine horizontal abd with t band  -GJ       User Key  (r) = Recorded By, (t) = Taken By, (c) = Cosigned By    Initials Name Provider Type    GONZALEZ Ruiz, PT Physical Therapist                Modalities       09/19/17 0700          Ice    Ice Applied Yes  -GJ      Location ice pack to R shoulder, pt. seated, RUE rested on pillow   -GJ      Rx Minutes 10 mins  -GJ      Ice S/P Rx Yes  -GJ        User Key  (r) = Recorded By, (t) = Taken By, (c) = Cosigned By    Initials Name Provider Type    GONZALEZ Ruiz, PT Physical Therapist                Exercises       09/19/17 0700          Subjective Comments    Subjective Comments I slept well again last night, today I didn't take my pain medicine, so we'll see how it goes   -GJ      Subjective Pain    Pre-Treatment Pain Level 0  -GJ      Exercise 6    Exercise Name 6 UBE  -GJ      Time (Minutes) 6 4  -GJ      Exercise 7    Exercise Name 7 Pulleys flex/abd  -GJ      Reps 7 20  -GJ      Exercise 8    Exercise Name 8 wall slide with towel   -GJ      Reps 8 20  -GJ      Exercise 12    Exercise Name 12 ER/IR with towel roll  -GJ      Cueing 12 Demo  -GJ      Sets 12 2  -GJ      Reps 12 10  -GJ      Additional Comments RTB  -GJ      Exercise 13    Exercise Name 13 FIR with cane  -GJ      Cueing 13 Demo  -GJ      Reps  13 15  -GJ      Exercise 14    Exercise Name 14 SL ER  -GJ      Cueing 14 Demo  -GJ      Sets 14 2  -GJ      Reps 14 10  -GJ      Additional Comments 1#  -GJ        User Key  (r) = Recorded By, (t) = Taken By, (c) = Cosigned By    Initials Name Provider Type    GONZALEZ Ruiz, PT Physical Therapist                        Manual Rx (last 36 hours)      Manual Treatments       09/19/17 0700 09/18/17 0900       Manual Rx 1    Manual Rx 1 Location GH joint distraction/oscillations, PA to R GHJ, lateral distraction R GHJ, pt. supine  -GJ GH joint distraction/oscillations, PA to R GHJ, lateral distraction R GHJ, pt. supine  -GJ     Manual Rx 2    Manual Rx 2 Location PROM R shoulder into flexion, abd, ER/IR, pt. supine  -GJ PROM R shoulder into flexion, abd, ER/IR, pt. supine  -GJ     Manual Rx 4    Manual Rx 4 Location rhythmic stabilization R shoulder (shoulder flexed to 90, ER/IR in POS at 90 abd)  -GJ rhythmic stabilization R shoulder (shoulder flexed to 90, ER/IR in POS at 90 abd)  -GJ     Manual Rx 5    Manual Rx 5 Location  PA mobs to T spine, pt. prone, nose in nose hole  -GJ     Manual Rx 5 Grade  grade II, pt. apprehensive with mobs of T spine  -GJ     Manual Rx 6    Manual Rx 6 Location R scapular mobs, pt. in L SL (elevation/depression, protraction/retraction, lifting medial border)  -GJ R scapular mobs, pt. in L SL (elevation/depression, protraction/retraction, lifting medial border)  -GJ       User Key  (r) = Recorded By, (t) = Taken By, (c) = Cosigned By    Initials Name Provider Type    GONZALEZ Ruiz, PT Physical Therapist                PT OP Goals       09/19/17 0700       PT Short Term Goals    STG Date to Achieve 09/29/17  -     STG 1 pt. to be I with initial HEP to facilitate self management of their condition  -GJ     STG 1 Progress Met  -GJ     STG 2 pt. to be educated in/verbalize understanding of the importance of posture/ergonomics in association with their condition to facilitate self  management of their condition  -GJ     STG 2 Progress Met  -GJ     Long Term Goals    LTG Date to Achieve 10/13/17  -GJ     LTG 1 pt. to be I with advanced HEP to facilitate self management of their condition  -GJ     LTG 1 Progress Ongoing  -GJ     LTG 2 pt. to report a DASH </= 25% to demonstrate decreased level of perceived disability  -GJ     LTG 2 Progress Ongoing  -GJ     LTG 3 pt. to demonstrate R shoulder flexion AROM >/= 0-150 to facilitate ease of performing functional/household activities  -GJ     LTG 3 Progress Ongoing  -GJ     LTG 4 pt. to demonstrate R shoulder abd AROM >/= 0-140 to facilitate ease of performing functional/household activities  -GJ     LTG 4 Progress Ongoing  -GJ     LTG 5 pt. to demonstrate R shoulder AROM FIR >/= mid line L2/3 to facilitate ease of performing self care/dressing activities  -GJ     LTG 5 Progress Ongoing  -GJ     LTG 5 Progress Comments initiated AAROM FIR today  -GJ     LTG 6 pt. to demonstrate placing/retrieving small object from an above the shoulder level shelf with his RUE to facilitate ease of performing household/work related activities  -GJ     LTG 6 Progress Ongoing  -GJ       User Key  (r) = Recorded By, (t) = Taken By, (c) = Cosigned By    Initials Name Provider Type    GONZALEZ Ruiz PT Physical Therapist                Therapy Education       09/19/17 0702          Therapy Education    Education Details pt. to only perform ROM activities at home, no strengthening at home yet  -GJ      Given HEP;Symptoms/condition management;Pain management;Posture/body mechanics;Mobility training  -GJ      Program Reinforced  -GJ      How Provided Verbal  -GJ      Provided to Patient  -GJ      Level of Understanding Verbalized  -GJ        User Key  (r) = Recorded By, (t) = Taken By, (c) = Cosigned By    Initials Name Provider Type    GONZALEZ Ruiz PT Physical Therapist                Time Calculation:   Start Time: 0702  Stop Time: 0800  Time Calculation (min):  58 min    Therapy Charges for Today     Code Description Service Date Service Provider Modifiers Qty    38952906936 HC PT HOT OR COLD PACK TREAT MCARE 9/19/2017 Simon Ruiz, PT GP 1    85252748631 HC PT THER PROC EA 15 MIN 9/19/2017 Simon Ruiz, PT GP 1    [id number]  PT MANUAL THERAPY EA 15 MIN 9/19/2017 Simon Ruiz, PT GP 2                    Simon Ruiz, PT  9/19/2017

## 2017-09-21 ENCOUNTER — HOSPITAL ENCOUNTER (OUTPATIENT)
Dept: PHYSICAL THERAPY | Facility: HOSPITAL | Age: 62
Setting detail: THERAPIES SERIES
Discharge: HOME | End: 2017-09-21

## 2017-09-21 DIAGNOSIS — Z74.09 IMPAIRED MOBILITY: ICD-10-CM

## 2017-09-21 DIAGNOSIS — M25.511 RIGHT SHOULDER PAIN, UNSPECIFIED CHRONICITY: ICD-10-CM

## 2017-09-21 DIAGNOSIS — Z47.89 ORTHOPEDIC AFTERCARE: Primary | ICD-10-CM

## 2017-09-21 PROCEDURE — 97110 THERAPEUTIC EXERCISES: CPT

## 2017-09-21 PROCEDURE — 97140 MANUAL THERAPY 1/> REGIONS: CPT

## 2017-09-21 NOTE — THERAPY TREATMENT NOTE
Outpatient Physical Therapy Ortho Treatment Note  [facility]     Patient Name: Uriel Garcia  : 1955  MRN: [id number]  Today's Date: 2017      Visit Date: 2017    Visit Dx:    ICD-10-CM ICD-9-CM   1. Orthopedic aftercare Z47.89 V54.9   2. Right shoulder pain, unspecified chronicity M25.511 719.41   3. Impaired mobility Z74.09 799.89       Patient Active Problem List   Diagnosis   • Anxiety   • Hyperlipidemia   • Right elbow pain   • Healthcare maintenance   • Right knee pain   • Chronic pain of left heel   • Chronic right shoulder pain   • Decreased range of motion of right shoulder   • Decreased range of motion of right elbow   • Erectile dysfunction        Past Medical History:   Diagnosis Date   • Anxiety    • Arthritis    • Benign colonic polyp    • BPPV (benign paroxysmal positional vertigo) 10/20/2016   • Hyperlipidemia     was on higher dose of Lipitor in past but had elevated LFTs. Added Zetia and now in good control   • Limited joint range of motion     RT SHOULDER   • Sleep apnea     uses CPAP        Past Surgical History:   Procedure Laterality Date   • COLONOSCOPY      Dr King/EBONY   • HERNIA REPAIR Right     Dr Garnett/EBONY   • JOINT MANIPULATION Right 2017    Procedure: RIGHT SHOULDER MANIPULATION;  Surgeon: Jann Barger MD;  Location: [facility] OR [facility] [place];  Service:    • SHOULDER ARTHROSCOPY Right 2017    Procedure: ARTHROSCOPY LYSIS OF ADHESIONS AND BURSECTOMY RIGHT SHOULDER;  Surgeon: Jann Barger MD;  Location: [facility] OR [facility];  Service:                              PT Assessment/Plan       17 1610       PT Assessment    Assessment Comments Patient very tender med scap with multiple trigger points. Held giving patient t-band exercises for home until next session. Consider adding to home program next session.   -CHINA       User Key  (r) = Recorded By, (t) = Taken By, (c) = Cosigned By    Initials Name Provider Type    CHINA Martin PTA Physical  Therapy Assistant                Modalities       09/21/17 1540          Ice    Ice Applied Yes  -WS      Location ice pack to R shoulder, pt. seated, RUE rested on pillow   -WS      Rx Minutes 10 mins  -WS      Ice S/P Rx Yes  -WS        User Key  (r) = Recorded By, (t) = Taken By, (c) = Cosigned By    Initials Name Provider Type    WS Melvin Martin PTA Physical Therapy Assistant                Exercises       09/21/17 1600 09/21/17 1540       Subjective Comments    Subjective Comments  Back to work today. Shoulder is sore and stiff  -WS     Exercise 1    Exercise Name 1 shoulder shrugs  -WS      Cueing 1 Demo  -WS      Time (Seconds) 1 30  -WS      Exercise 2    Exercise Name 2 scap retraction  -WS      Cueing 2 Demo  -WS      Reps 2 20  -WS      Additional Comments RTB  -WS      Exercise 3    Exercise Name 3 AAROM flexion in supine (clasping hands)  -WS      Cueing 3 Demo  -WS      Exercise 4    Exercise Name 4 AAROM w/ cane for ER   -WS      Cueing 4 Demo  -WS      Exercise 6    Exercise Name 6 UBE  -WS      Time (Minutes) 6 4  -WS      Exercise 7    Exercise Name 7 Pulleys flex/abd  -WS      Reps 7 20  -WS      Exercise 8    Exercise Name 8 wall slide with towel   -WS      Reps 8 20  -WS      Exercise 9    Exercise Name 9 AAROM shoulder ext and IR with cane  -WS      Exercise 10    Exercise Name 10 Self assist shoulder IR   -WS      Reps 10 5  -WS      Time (Seconds) 10 5  -WS      Exercise 11    Exercise Name 11 supine ER stretch - beach chair position  -WS      Reps 11 3  -WS      Time (Seconds) 11 20  -WS      Exercise 12    Exercise Name 12 ER/IR with towel roll  -WS      Cueing 12 Demo  -WS      Sets 12 2  -WS      Reps 12 10  -WS      Additional Comments RTB  -WS      Exercise 13    Exercise Name 13 FIR with cane  -WS      Cueing 13 Demo  -WS      Reps 13 15  -WS      Additional Comments 2#  -WS      Exercise 14    Exercise Name 14 SL ER  -WS      Cueing 14 Demo  -WS      Sets 14 2  -WS      Reps 14  10  -WS      Additional Comments 1#  -WS      Exercise 15    Exercise Name 15 shld ext   -WS      Reps 15 10  -WS      Additional Comments RTB  -WS        User Key  (r) = Recorded By, (t) = Taken By, (c) = Cosigned By    Initials Name Provider Type    WS Melvin Martin PTA Physical Therapy Assistant                        Manual Rx (last 36 hours)      Manual Treatments       09/21/17 1609          Manual Rx 1    Manual Rx 1 Location GH joint distraction/oscillations, PA to R GHJ, lateral distraction R GHJ, pt. supine  -WS      Manual Rx 2    Manual Rx 2 Location PROM R shoulder into flexion, abd, ER/IR, pt. supine  -WS      Manual Rx 3    Manual Rx 3 Location STM UT, med scap border  -WS      Manual Rx 4    Manual Rx 4 Location rhythmic stabilization R shoulder (shoulder flexed to 90, ER/IR in POS at 90 abd)  -        User Key  (r) = Recorded By, (t) = Taken By, (c) = Cosigned By    Initials Name Provider Type    CHINA Martin PTA Physical Therapy Assistant                PT OP Goals       09/21/17 1600       PT Short Term Goals    STG Date to Achieve 09/29/17  -WS     STG 1 pt. to be I with initial HEP to facilitate self management of their condition  -WS     STG 1 Progress Met  -WS     STG 2 pt. to be educated in/verbalize understanding of the importance of posture/ergonomics in association with their condition to facilitate self management of their condition  -WS     STG 2 Progress Met  -     Long Term Goals    LTG Date to Achieve 10/13/17  -WS     LTG 1 pt. to be I with advanced HEP to facilitate self management of their condition  -WS     LTG 1 Progress Ongoing  -WS     LTG 2 pt. to report a DASH </= 25% to demonstrate decreased level of perceived disability  -WS     LTG 2 Progress Ongoing  -WS     LTG 3 pt. to demonstrate R shoulder flexion AROM >/= 0-150 to facilitate ease of performing functional/household activities  -WS     LTG 3 Progress Ongoing  -WS     LTG 4 pt. to demonstrate R shoulder  abd AROM >/= 0-140 to facilitate ease of performing functional/household activities  -WS     LTG 4 Progress Ongoing  -WS     LTG 5 pt. to demonstrate R shoulder AROM FIR >/= mid line L2/3 to facilitate ease of performing self care/dressing activities  -WS     LTG 5 Progress Ongoing  -WS     LTG 6 pt. to demonstrate placing/retrieving small object from an above the shoulder level shelf with his RUE to facilitate ease of performing household/work related activities  -WS     LTG 6 Progress Ongoing  -WS       User Key  (r) = Recorded By, (t) = Taken By, (c) = Cosigned By    Initials Name Provider Type    CHINA Martin PTA Physical Therapy Assistant                Therapy Education       09/21/17 1610          Therapy Education    Given HEP;Symptoms/condition management;Posture/body mechanics  -WS      Program Reinforced  -WS      How Provided Verbal  -WS      Provided to Patient  -WS        User Key  (r) = Recorded By, (t) = Taken By, (c) = Cosigned By    Initials Name Provider Type    CHINA Martin PTA Physical Therapy Assistant                Time Calculation:   Start Time: 1620  Stop Time: 1710  Time Calculation (min): 50 min    Therapy Charges for Today     Code Description Service Date Service Provider Modifiers Qty    64387887778 HC PT THER PROC EA 15 MIN 9/21/2017 Melvin Martin PTA GP 2    57989097823 HC PT MANUAL THERAPY EA 15 MIN 9/21/2017 Melvin Martin PTA GP 1    53030350032 HC PT HOT OR COLD PACK TREAT MCARE 9/21/2017 Mevlin Martin PTA GP 1                    Melvin Martin PTA  9/21/2017

## 2017-09-26 ENCOUNTER — HOSPITAL ENCOUNTER (OUTPATIENT)
Dept: PHYSICAL THERAPY | Facility: HOSPITAL | Age: 62
Setting detail: THERAPIES SERIES
Discharge: HOME | End: 2017-09-26

## 2017-09-26 DIAGNOSIS — Z47.89 ORTHOPEDIC AFTERCARE: Primary | ICD-10-CM

## 2017-09-26 DIAGNOSIS — M25.521 RIGHT ELBOW PAIN: ICD-10-CM

## 2017-09-26 DIAGNOSIS — M25.511 RIGHT SHOULDER PAIN, UNSPECIFIED CHRONICITY: ICD-10-CM

## 2017-09-26 DIAGNOSIS — Z74.09 IMPAIRED MOBILITY: ICD-10-CM

## 2017-09-26 PROCEDURE — 97110 THERAPEUTIC EXERCISES: CPT

## 2017-09-26 PROCEDURE — 97140 MANUAL THERAPY 1/> REGIONS: CPT

## 2017-09-26 NOTE — THERAPY TREATMENT NOTE
Outpatient Physical Therapy Ortho Treatment Note  [facility]     Patient Name: Uriel Garcia  : 1955  MRN: [id number]  Today's Date: 2017      Visit Date: 2017    Visit Dx:    ICD-10-CM ICD-9-CM   1. Orthopedic aftercare Z47.89 V54.9   2. Right shoulder pain, unspecified chronicity M25.511 719.41   3. Impaired mobility Z74.09 799.89   4. Right elbow pain M25.521 719.42       Patient Active Problem List   Diagnosis   • Anxiety   • Hyperlipidemia   • Right elbow pain   • Healthcare maintenance   • Right knee pain   • Chronic pain of left heel   • Chronic right shoulder pain   • Decreased range of motion of right shoulder   • Decreased range of motion of right elbow   • Erectile dysfunction        Past Medical History:   Diagnosis Date   • Anxiety    • Arthritis    • Benign colonic polyp    • BPPV (benign paroxysmal positional vertigo) 10/20/2016   • Hyperlipidemia     was on higher dose of Lipitor in past but had elevated LFTs. Added Zetia and now in good control   • Limited joint range of motion     RT SHOULDER   • Sleep apnea     uses CPAP        Past Surgical History:   Procedure Laterality Date   • COLONOSCOPY      Dr King/EBONY   • HERNIA REPAIR Right     Dr Garnett/EBONY   • JOINT MANIPULATION Right 2017    Procedure: RIGHT SHOULDER MANIPULATION;  Surgeon: Jann Barger MD;  Location: [facility] OR [facility] [place];  Service:    • SHOULDER ARTHROSCOPY Right 2017    Procedure: ARTHROSCOPY LYSIS OF ADHESIONS AND BURSECTOMY RIGHT SHOULDER;  Surgeon: Jann Barger MD;  Location: [facility] OR [facility] [place];  Service:                              PT Assessment/Plan       17 6760       PT Assessment    Assessment Comments Pt reports no pain and states that overall, he has been feeling good lately. Pt states that he feels that the soreness noted last visit has dissipated. Progressed HEP today and administered updated handouts. Continued with scapular and postural strengthening as tolerated, and  pt requires cueing for slow, controlled movement as well as proper muscle activation at times.   -CN     PT Plan    PT Plan Comments Assess response to added exercises and progress as tolerated.   -CN       User Key  (r) = Recorded By, (t) = Taken By, (c) = Cosigned By    Initials Name Provider Type    FRANCO Loving, PT Physical Therapist                Modalities       09/26/17 1500          Ice    Ice Applied Yes  -CN      Location ice pack to R shoulder, pt. seated, RUE rested on pillow   -CN      Rx Minutes 10 mins  -CN      Ice S/P Rx Yes  -CN        User Key  (r) = Recorded By, (t) = Taken By, (c) = Cosigned By    Initials Name Provider Type    FRANCO Loving, PT Physical Therapist                Exercises       09/26/17 1500          Subjective Comments    Subjective Comments It feels pretty good. I am doing the exercises.   -CN      Subjective Pain    Able to rate subjective pain? yes  -CN      Pre-Treatment Pain Level 0  -CN      Exercise 1    Exercise Name 1 Standing horizontal abduction  -CN      Cueing 1 Demo  -CN      Reps 1 10  -CN      Additional Comments RTB, cueing to decrease shoulder hike and for slow, controlled movement  -CN      Exercise 3    Exercise Name 3 AAROM flexion in supine (clasping hands)  -CN      Cueing 3 Demo  -CN      Reps 3 15  -CN      Additional Comments 2#  -CN      Exercise 5    Exercise Name 5 Supine press ups  -CN      Cueing 5 Demo  -CN      Sets 5 2  -CN      Reps 5 10  -CN      Additional Comments 2# on cane  -CN      Exercise 6    Exercise Name 6 UBE  -CN      Time (Minutes) 6 4  -CN      Exercise 7    Exercise Name 7 Pulleys flex/abd  -CN      Reps 7 20  -CN      Exercise 14    Exercise Name 14 SL ER  -CN      Cueing 14 Demo  -CN      Sets 14 2  -CN      Reps 14 10  -CN      Additional Comments 2#  -CN      Exercise 16    Exercise Name 16 SL abduction  -CN      Cueing 16 Demo  -CN      Sets 16 2  -CN      Reps 16 10  -CN      Additional Comments  2#  -CN      Exercise 17    Exercise Name 17 IR towel stretch  -CN      Cueing 17 Demo  -CN      Reps 17 10  -CN      Time (Seconds) 17 10  -CN      Exercise 18    Exercise Name 18 Prone rows  -CN      Cueing 18 Demo  -CN      Sets 18 2  -CN      Reps 18 10  -CN      Additional Comments 3#  -CN      Exercise 19    Exercise Name 19 Prone shoulder extension  -CN      Cueing 19 Demo  -CN      Sets 19 2  -CN      Reps 19 10  -CN      Exercise 20    Exercise Name 20 Prone Ys and Ts  -CN      Cueing 20 Demo  -CN      Reps 20 10  -CN      Additional Comments Cueing for proper activation  -CN        User Key  (r) = Recorded By, (t) = Taken By, (c) = Cosigned By    Initials Name Provider Type    FRANCO Loving, PT Physical Therapist                        Manual Rx (last 36 hours)      Manual Treatments       09/26/17 1500          Manual Rx 1    Manual Rx 1 Location GH joint distraction/oscillations, PA to R GHJ, lateral distraction R GHJ, pt. supine  -CN      Manual Rx 2    Manual Rx 2 Location PROM R shoulder into flexion, abd, ER/IR, pt. supine  -CN      Manual Rx 2 Duration 15 min  -CN        User Key  (r) = Recorded By, (t) = Taken By, (c) = Cosigned By    Initials Name Provider Type    FRANCO Loving, PT Physical Therapist                PT OP Goals       09/26/17 1700       PT Short Term Goals    STG Date to Achieve 09/29/17  -CN     STG 1 pt. to be I with initial HEP to facilitate self management of their condition  -CN     STG 1 Progress Met  -CN     STG 2 pt. to be educated in/verbalize understanding of the importance of posture/ergonomics in association with their condition to facilitate self management of their condition  -CN     STG 2 Progress Met  -CN     Long Term Goals    LTG Date to Achieve 10/13/17  -CN     LTG 1 pt. to be I with advanced HEP to facilitate self management of their condition  -CN     LTG 1 Progress Ongoing  -CN     LTG 1 Progress Comments Continuing to progress HEP as  tolerated.   -CN     LTG 2 pt. to report a DASH </= 25% to demonstrate decreased level of perceived disability  -CN     LTG 2 Progress Ongoing  -CN     LTG 3 pt. to demonstrate R shoulder flexion AROM >/= 0-150 to facilitate ease of performing functional/household activities  -CN     LTG 3 Progress Progressing  -CN     LTG 4 pt. to demonstrate R shoulder abd AROM >/= 0-140 to facilitate ease of performing functional/household activities  -CN     LTG 4 Progress Progressing  -CN     LTG 5 pt. to demonstrate R shoulder AROM FIR >/= mid line L2/3 to facilitate ease of performing self care/dressing activities  -CN     LTG 5 Progress Ongoing  -CN     LTG 5 Progress Comments Pt with greatest limitation into IR.   -CN     LTG 6 pt. to demonstrate placing/retrieving small object from an above the shoulder level shelf with his RUE to facilitate ease of performing household/work related activities  -CN     LTG 6 Progress Ongoing  -CN       User Key  (r) = Recorded By, (t) = Taken By, (c) = Cosigned By    Initials Name Provider Type    FRANCO Loving PT Physical Therapist                Therapy Education       09/26/17 1551          Therapy Education    Given HEP;Symptoms/condition management;Posture/body mechanics  -CN      Program Reinforced  -CN      How Provided Verbal  -CN      Provided to Patient  -CN      Level of Understanding Verbalized  -CN        User Key  (r) = Recorded By, (t) = Taken By, (c) = Cosigned By    Initials Name Provider Type    FRANCO Loving PT Physical Therapist                Time Calculation:   Start Time: 1530  Stop Time: 1625  Time Calculation (min): 55 min    Therapy Charges for Today     Code Description Service Date Service Provider Modifiers Qty    75657166002 HC PT THER PROC EA 15 MIN 9/26/2017 Alem Loving PT GP 2    98299311400 HC PT MANUAL THERAPY EA 15 MIN 9/26/2017 Alem Loving PT GP 1    [id number]  PT HOT OR COLD PACK TREAT MCARE  9/26/2017 Alem Loving, PT GP 1                    Alem Loving, PT  9/26/2017

## 2017-09-29 ENCOUNTER — HOSPITAL ENCOUNTER (OUTPATIENT)
Dept: PHYSICAL THERAPY | Facility: HOSPITAL | Age: 62
Setting detail: THERAPIES SERIES
Discharge: HOME | End: 2017-09-29

## 2017-09-29 DIAGNOSIS — Z47.89 ORTHOPEDIC AFTERCARE: Primary | ICD-10-CM

## 2017-09-29 DIAGNOSIS — Z74.09 IMPAIRED MOBILITY: ICD-10-CM

## 2017-09-29 DIAGNOSIS — M25.511 RIGHT SHOULDER PAIN, UNSPECIFIED CHRONICITY: ICD-10-CM

## 2017-09-29 PROCEDURE — 97140 MANUAL THERAPY 1/> REGIONS: CPT | Performed by: PHYSICAL THERAPIST

## 2017-09-29 PROCEDURE — 97110 THERAPEUTIC EXERCISES: CPT | Performed by: PHYSICAL THERAPIST

## 2017-09-29 NOTE — THERAPY TREATMENT NOTE
Outpatient Physical Therapy Ortho Treatment Note  [facility]     Patient Name: Uriel Garcia  : 1955  MRN: [id number]  Today's Date: 2017      Visit Date: 2017    Visit Dx:    ICD-10-CM ICD-9-CM   1. Orthopedic aftercare Z47.89 V54.9   2. Right shoulder pain, unspecified chronicity M25.511 719.41   3. Impaired mobility Z74.09 799.89       Patient Active Problem List   Diagnosis   • Anxiety   • Hyperlipidemia   • Right elbow pain   • Healthcare maintenance   • Right knee pain   • Chronic pain of left heel   • Chronic right shoulder pain   • Decreased range of motion of right shoulder   • Decreased range of motion of right elbow   • Erectile dysfunction        Past Medical History:   Diagnosis Date   • Anxiety    • Arthritis    • Benign colonic polyp    • BPPV (benign paroxysmal positional vertigo) 10/20/2016   • Hyperlipidemia     was on higher dose of Lipitor in past but had elevated LFTs. Added Zetia and now in good control   • Limited joint range of motion     RT SHOULDER   • Sleep apnea     uses CPAP        Past Surgical History:   Procedure Laterality Date   • COLONOSCOPY      Dr King/EBONY   • HERNIA REPAIR Right     Dr Garnett/EBONY   • JOINT MANIPULATION Right 2017    Procedure: RIGHT SHOULDER MANIPULATION;  Surgeon: Jann Barger MD;  Location: [facility] OR [facility] [place];  Service:    • SHOULDER ARTHROSCOPY Right 2017    Procedure: ARTHROSCOPY LYSIS OF ADHESIONS AND BURSECTOMY RIGHT SHOULDER;  Surgeon: Jann Barger MD;  Location: [facility] OR [facility] [place];  Service:              PT Ortho       17 1400    Right Shoulder    Flexion AROM Deficit 140 seated  -GJ    Flexion PROM Deficit 175 supine  -GJ    ABduction AROM Deficit 155 seated  -GJ    ABduction PROM Deficit 180 supine  -GJ    External Rotation PROM Deficit 80, supine, 90 abd, POS  -GJ    Internal Rotation AROM Deficit FIR midline L2  -GJ    Internal Rotation PROM Deficit 65, supine, POS, 90 abd  -GJ      User Key  (r) =  Recorded By, (t) = Taken By, (c) = Cosigned By    Initials Name Provider Type    GONZALEZ Ruiz, PT Physical Therapist                            PT Assessment/Plan       09/29/17 1550       PT Assessment    Assessment Comments Mr. Garcia is 2 weeks s/p R shoulder manipulation/bursectomy.  See specific joints for ROM.  Mr. Garcia continues to demonstrate improved A/PROM of his RUE.  He demonstrates rapid fatigue of scapular stabilzer tissues and will require further work to address strength deficits.  Mr. Garcia continues to be a good candidate for skilled physical therapy.   -GJ     PT Plan    PT Plan Comments continue to work on scapular stabilizer strength, consider adding wall push ups and supine serratus punch (possibly over white foam roll), add D2 slow reversal. PROM   -GJ       User Key  (r) = Recorded By, (t) = Taken By, (c) = Cosigned By    Initials Name Provider Type    GONZALEZ Ruiz, PT Physical Therapist                Modalities       09/29/17 1400          Ice    Ice Applied Yes  -GJ      Location ice pack to R shoulder, pt. seated, RUE rested on pillow   -GJ      Rx Minutes --   5  -GJ      Ice S/P Rx Yes  -GJ        User Key  (r) = Recorded By, (t) = Taken By, (c) = Cosigned By    Initials Name Provider Type    GONZALEZ Ruiz, PT Physical Therapist                Exercises       09/29/17 1400          Subjective Comments    Subjective Comments I've been doing really well. I feel like I have plataued or the gains aren't as rapid. But I have slacked on exercises a little    -GJ      Subjective Pain    Pre-Treatment Pain Level 1  -GJ      Exercise 1    Exercise Name 1 Standing horizontal abduction  -GJ      Cueing 1 Demo  -GJ      Sets 1 2  -GJ      Reps 1 10  -GJ      Additional Comments RTB  -GJ      Exercise 4    Exercise Name 4 standing, bilateral ER  -GJ      Cueing 4 Demo  -GJ      Sets 4 2  -GJ      Reps 4 10  -GJ      Additional Comments RTB  -GJ      Exercise 5    Exercise Name 5 Supine  press ups  -GJ      Cueing 5 Demo  -GJ      Sets 5 2  -GJ      Reps 5 10  -GJ      Additional Comments 4# on cane  -GJ      Exercise 7    Exercise Name 7 Pulleys flex/abd  -GJ      Time (Minutes) 7 3  -GJ      Exercise 8    Exercise Name 8 wall slide with towel   -GJ      Reps 8 20  -GJ      Exercise 14    Exercise Name 14 SL ER  -GJ      Cueing 14 Demo  -GJ      Sets 14 2  -GJ      Reps 14 10  -GJ      Additional Comments 2#  -GJ      Exercise 16    Exercise Name 16 SL abduction  -GJ      Cueing 16 Demo  -GJ      Sets 16 2  -GJ      Reps 16 10  -GJ      Additional Comments 2#  -GJ      Exercise 18    Exercise Name 18 Prone rows  -GJ      Cueing 18 Demo  -GJ      Sets 18 2  -GJ      Reps 18 10  -GJ      Additional Comments 3#  -GJ      Exercise 19    Exercise Name 19 Prone shoulder extension  -GJ      Cueing 19 Demo  -GJ      Sets 19 2  -GJ      Reps 19 10  -GJ      Additional Comments 2#  -GJ      Exercise 20    Exercise Name 20 Prone Ys and Ts  -GJ      Cueing 20 Demo  -GJ      Sets 20 2  -GJ      Reps 20 10  -GJ        User Key  (r) = Recorded By, (t) = Taken By, (c) = Cosigned By    Initials Name Provider Type    GJ Simon Ruiz, PT Physical Therapist                        Manual Rx (last 36 hours)      Manual Treatments       09/29/17 1500          Manual Rx 1    Manual Rx 1 Location GH joint distraction/oscillations, PA to R GHJ, lateral distraction R GHJ, pt. supine  -GJ      Manual Rx 2    Manual Rx 2 Location PROM R shoulder into flexion, abd, ER/IR, pt. supine  -GJ      Manual Rx 4    Manual Rx 4 Location rhythmic stabilizatio, RUE flexed to 120, pt. supine  -GJ      Manual Rx 5    Manual Rx 5 Location slow reversal ER/IR, POS, 90 abd, pt. supine  -GJ        User Key  (r) = Recorded By, (t) = Taken By, (c) = Cosigned By    Initials Name Provider Type    GJ Simon Ruiz, PT Physical Therapist                PT OP Goals       09/29/17 1400       PT Short Term Goals    STG Date to Achieve 09/29/17  -GJ      STG 1 pt. to be I with initial HEP to facilitate self management of their condition  -GJ     STG 1 Progress Met  -GJ     STG 2 pt. to be educated in/verbalize understanding of the importance of posture/ergonomics in association with their condition to facilitate self management of their condition  -GJ     STG 2 Progress Met  -GJ     Long Term Goals    LTG Date to Achieve 10/13/17  -GJ     LTG 1 pt. to be I with advanced HEP to facilitate self management of their condition  -GJ     LTG 1 Progress Ongoing  -GJ     LTG 2 pt. to report a DASH </= 25% to demonstrate decreased level of perceived disability  -GJ     LTG 2 Progress Ongoing  -GJ     LTG 3 pt. to demonstrate R shoulder flexion AROM >/= 0-150 to facilitate ease of performing functional/household activities  -GJ     LTG 3 Progress Progressing  -GJ     LTG 3 Progress Comments 140  -GJ     LTG 4 pt. to demonstrate R shoulder abd AROM >/= 0-140 to facilitate ease of performing functional/household activities  -GJ     LTG 4 Progress Partially Met  -GJ     LTG 4 Progress Comments mild level of compensation  -GJ     LTG 5 pt. to demonstrate R shoulder AROM FIR >/= mid line L2/3 to facilitate ease of performing self care/dressing activities  -GJ     LTG 5 Progress Partially Met  -GJ     LTG 5 Progress Comments AROM FIR midline L2, takes time to get there  -GJ     LTG 6 pt. to demonstrate placing/retrieving small object from an above the shoulder level shelf with his RUE to facilitate ease of performing household/work related activities  -GJ     LTG 6 Progress Ongoing  -GJ       User Key  (r) = Recorded By, (t) = Taken By, (c) = Cosigned By    Initials Name Provider Type    GONZALEZ Ruiz, PT Physical Therapist                Therapy Education       09/29/17 2135          Therapy Education    Given HEP;Symptoms/condition management;Pain management;Posture/body mechanics;Mobility training  -GJ      Program Progressed  -GJ      How Provided Verbal  -GJ       Provided to Patient  -GJ      Level of Understanding Verbalized  -GJ        User Key  (r) = Recorded By, (t) = Taken By, (c) = Cosigned By    Initials Name Provider Type    GONZALEZ Ruiz, PT Physical Therapist                Time Calculation:   Start Time: 1445  Stop Time: 1545  Time Calculation (min): 60 min    Therapy Charges for Today     Code Description Service Date Service Provider Modifiers Qty    15678123235  PT THER PROC EA 15 MIN 9/29/2017 Simon Ruiz, PT GP 2    54053098351 HC PT MANUAL THERAPY EA 15 MIN 9/29/2017 Simon Ruiz, PT GP 1    98891920184  PT HOT OR COLD PACK TREAT MCARE 9/29/2017 Simon Ruiz, PT GP 1                    Simon Ruiz, PT  9/29/2017

## 2017-10-02 ENCOUNTER — HOSPITAL ENCOUNTER (OUTPATIENT)
Dept: PHYSICAL THERAPY | Facility: HOSPITAL | Age: 62
Setting detail: THERAPIES SERIES
Discharge: HOME | End: 2017-10-02

## 2017-10-02 DIAGNOSIS — M25.511 RIGHT SHOULDER PAIN, UNSPECIFIED CHRONICITY: ICD-10-CM

## 2017-10-02 DIAGNOSIS — Z74.09 IMPAIRED MOBILITY: ICD-10-CM

## 2017-10-02 DIAGNOSIS — Z47.89 ORTHOPEDIC AFTERCARE: Primary | ICD-10-CM

## 2017-10-02 PROCEDURE — 97110 THERAPEUTIC EXERCISES: CPT | Performed by: PHYSICAL THERAPIST

## 2017-10-02 PROCEDURE — 97140 MANUAL THERAPY 1/> REGIONS: CPT | Performed by: PHYSICAL THERAPIST

## 2017-10-02 NOTE — THERAPY TREATMENT NOTE
Outpatient Physical Therapy Ortho Treatment Note  [facility]     Patient Name: Uriel Garcia  : 1955  MRN: [id number]  Today's Date: 10/2/2017      Visit Date: 10/02/2017    Visit Dx:    ICD-10-CM ICD-9-CM   1. Orthopedic aftercare Z47.89 V54.9   2. Right shoulder pain, unspecified chronicity M25.511 719.41   3. Impaired mobility Z74.09 799.89       Patient Active Problem List   Diagnosis   • Anxiety   • Hyperlipidemia   • Right elbow pain   • Healthcare maintenance   • Right knee pain   • Chronic pain of left heel   • Chronic right shoulder pain   • Decreased range of motion of right shoulder   • Decreased range of motion of right elbow   • Erectile dysfunction        Past Medical History:   Diagnosis Date   • Anxiety    • Arthritis    • Benign colonic polyp    • BPPV (benign paroxysmal positional vertigo) 10/20/2016   • Hyperlipidemia     was on higher dose of Lipitor in past but had elevated LFTs. Added Zetia and now in good control   • Limited joint range of motion     RT SHOULDER   • Sleep apnea     uses CPAP        Past Surgical History:   Procedure Laterality Date   • COLONOSCOPY      Dr King/EBONY   • HERNIA REPAIR Right     Dr Garnett/EBONY   • JOINT MANIPULATION Right 2017    Procedure: RIGHT SHOULDER MANIPULATION;  Surgeon: Jann Barger MD;  Location: [facility] OR [facility] [place];  Service:    • SHOULDER ARTHROSCOPY Right 2017    Procedure: ARTHROSCOPY LYSIS OF ADHESIONS AND BURSECTOMY RIGHT SHOULDER;  Surgeon: Jann Barger MD;  Location: [facility] OR [facility] [place];  Service:              PT Ortho       10/02/17 1400    Subjective Pain    Pre-Treatment Pain Level 1  -      User Key  (r) = Recorded By, (t) = Taken By, (c) = Cosigned By    Initials Name Provider Type    GONZALEZ Ruiz PT Physical Therapist                            PT Assessment/Plan       10/02/17 4572       PT Assessment    Assessment Comments Mr. Garcia continues to progress.  He demonstrates improving but weak R  scapular stabilizers most notable in upper ranges of R shoulder movement.  He fatigues rapidly and requires cuing for form/technique. He is going on vacation for 1.5 weeks, will see when he returns.  He was given a program to perform while on vacation.   -GJ     PT Plan    PT Plan Comments assess respose to vacation, assess R shoulder ROM.  Continue to work on scapular stabilizer strength.   -GJ       User Key  (r) = Recorded By, (t) = Taken By, (c) = Cosigned By    Initials Name Provider Type    GONZALEZ Ruiz, PT Physical Therapist                Modalities       10/02/17 1400          Ice    Ice Applied Yes  -GJ      Location ice pack to R shoulder, pt. seated, RUE rested on pillow   -GJ      Rx Minutes 10 mins  -GJ      Ice S/P Rx Yes  -GJ        User Key  (r) = Recorded By, (t) = Taken By, (c) = Cosigned By    Initials Name Provider Type    GONZALEZ Ruiz, PT Physical Therapist                Exercises       10/02/17 1400          Subjective Comments    Subjective Comments I went on a 6 mile hike yesterday and my shoulder swelled, it took about 2 hours to go down.   -GJ      Subjective Pain    Pre-Treatment Pain Level 1  -GJ      Exercise 1    Exercise Name 1 Standing clock  -GJ      Cueing 1 Demo  -GJ      Sets 1 2  -GJ      Reps 1 5  -GJ      Additional Comments RTB  -GJ      Exercise 4    Exercise Name 4 standing, bilateral ER  -GJ      Cueing 4 Demo  -GJ      Sets 4 2  -GJ      Reps 4 10  -GJ      Additional Comments RTB  -GJ      Exercise 6    Exercise Name 6 UBE  -GJ      Time (Minutes) 6 4  -GJ      Exercise 7    Exercise Name 7 Pulleys flex/abd  -GJ      Time (Minutes) 7 3  -GJ      Exercise 8    Exercise Name 8 wall slide with towel   -GJ      Reps 8 20  -GJ      Exercise 10    Exercise Name 10 supine triceps   -GJ      Cueing 10 Demo  -GJ      Sets 10 2  -GJ      Reps 10 10  -GJ      Additional Comments 2#  -GJ      Exercise 11    Exercise Name 11 wall push ups  -GJ      Cueing 11 Verbal  -GJ       Reps 11 12  -GJ      Exercise 12    Exercise Name 12 Serratus punch  -GJ      Cueing 12 Demo  -GJ      Sets 12 2  -GJ      Reps 12 10  -GJ      Additional Comments 4#  -GJ      Exercise 13    Exercise Name 13 FIR with cane  -GJ      Cueing 13 Demo  -GJ      Reps 13 20  -GJ      Additional Comments 4#  -GJ      Exercise 14    Exercise Name 14 SL ER  -GJ      Cueing 14 Demo  -GJ      Sets 14 2  -GJ      Reps 14 10  -GJ      Additional Comments 2#  -GJ      Exercise 16    Exercise Name 16 SL abduction  -GJ      Cueing 16 Demo  -GJ      Sets 16 2  -GJ      Reps 16 10  -GJ      Additional Comments 2#  -GJ      Exercise 17    Exercise Name 17 IR towel stretch  -GJ      Cueing 17 Verbal  -GJ      Reps 17 10  -GJ      Time (Seconds) 17 5  -GJ      Exercise 18    Exercise Name 18 Prone rows  -GJ      Cueing 18 Demo  -GJ      Sets 18 2  -GJ      Reps 18 10  -GJ      Additional Comments 3#  -GJ      Exercise 19    Exercise Name 19 Prone shoulder extension  -GJ      Cueing 19 Demo  -GJ      Sets 19 2  -GJ      Reps 19 10  -GJ      Additional Comments 3#  -GJ      Exercise 20    Exercise Name 20 Prone Ys and Ts  -GJ      Cueing 20 Demo  -GJ      Sets 20 2  -GJ      Reps 20 10  -GJ        User Key  (r) = Recorded By, (t) = Taken By, (c) = Cosigned By    Initials Name Provider Type    GONZALEZ Ruiz, PT Physical Therapist                        Manual Rx (last 36 hours)      Manual Treatments       10/02/17 1500          Manual Rx 1    Manual Rx 1 Location GH joint distraction/oscillations, PA to R GHJ, lateral distraction R GHJ, pt. supine  -GJ      Manual Rx 2    Manual Rx 2 Location PROM R shoulder into flexion, abd, ER/IR, pt. supine  -GJ      Manual Rx 4    Manual Rx 4 Location rhythmic stabilizatio, RUE flexed to 120, pt. supine  -GJ      Manual Rx 5    Manual Rx 5 Location slow reversal ER/IR, POS, 90 abd, pt. supine  -GJ      Manual Rx 6    Manual Rx 6 Location R shoulder D 2 slow reversal, pt. supine, focus on upper  ranges.   -GJ      Manual Rx 6 Type    -GJ        User Key  (r) = Recorded By, (t) = Taken By, (c) = Cosigned By    Initials Name Provider Type    GONZALEZ Ruiz PT Physical Therapist                PT OP Goals       10/02/17 1400       PT Short Term Goals    STG Date to Achieve 09/29/17  -GJ     STG 1 pt. to be I with initial HEP to facilitate self management of their condition  -GJ     STG 1 Progress Met  -GJ     STG 2 pt. to be educated in/verbalize understanding of the importance of posture/ergonomics in association with their condition to facilitate self management of their condition  -GJ     STG 2 Progress Met  -GJ     Long Term Goals    LTG Date to Achieve 10/13/17  -GJ     LTG 1 pt. to be I with advanced HEP to facilitate self management of their condition  -GJ     LTG 1 Progress Ongoing  -GJ     LTG 2 pt. to report a DASH </= 25% to demonstrate decreased level of perceived disability  -GJ     LTG 2 Progress Ongoing  -GJ     LTG 3 pt. to demonstrate R shoulder flexion AROM >/= 0-150 to facilitate ease of performing functional/household activities  -GJ     LTG 3 Progress Progressing  -GJ     LTG 4 pt. to demonstrate R shoulder abd AROM >/= 0-140 to facilitate ease of performing functional/household activities  -GJ     LTG 4 Progress Partially Met  -GJ     LTG 5 pt. to demonstrate R shoulder AROM FIR >/= mid line L2/3 to facilitate ease of performing self care/dressing activities  -GJ     LTG 5 Progress Partially Met  -GJ     LTG 6 pt. to demonstrate placing/retrieving small object from an above the shoulder level shelf with his RUE to facilitate ease of performing household/work related activities  -GJ     LTG 6 Progress Ongoing  -GJ       User Key  (r) = Recorded By, (t) = Taken By, (c) = Cosigned By    Initials Name Provider Type    GONZALEZ Ruiz PT Physical Therapist                Therapy Education       10/02/17 1441          Therapy Education    Education Details on vacation, to perform wall  wash and FIR daily.  To perform prone exercies, wall push ups and standing clock every other day.   -GJ      Given HEP;Symptoms/condition management;Posture/body mechanics;Pain management;Mobility training  -GJ      Program Reinforced  -GJ      How Provided Verbal  -GJ      Provided to Patient  -GJ      Level of Understanding Verbalized  -GJ        User Key  (r) = Recorded By, (t) = Taken By, (c) = Cosigned By    Initials Name Provider Type    GJ Simon Ruiz, PT Physical Therapist                Time Calculation:   Start Time: 1445  Stop Time: 1540  Time Calculation (min): 55 min    Therapy Charges for Today     Code Description Service Date Service Provider Modifiers Qty    00652712230 HC PT MANUAL THERAPY EA 15 MIN 10/2/2017 Simon Ruiz, PT GP 1    85283573467 HC PT HOT OR COLD PACK TREAT MCARE 10/2/2017 Simon Ruiz, PT GP 1    52972847728 HC PT THER PROC EA 15 MIN 10/2/2017 Simon Ruiz, PT GP 2                    Simon Ruiz, PT  10/2/2017

## 2017-10-05 ENCOUNTER — APPOINTMENT (OUTPATIENT)
Dept: PHYSICAL THERAPY | Facility: HOSPITAL | Age: 62
End: 2017-10-05

## 2017-10-09 ENCOUNTER — APPOINTMENT (OUTPATIENT)
Dept: PHYSICAL THERAPY | Facility: HOSPITAL | Age: 62
End: 2017-10-09

## 2017-10-12 ENCOUNTER — APPOINTMENT (OUTPATIENT)
Dept: PHYSICAL THERAPY | Facility: HOSPITAL | Age: 62
End: 2017-10-12

## 2017-10-27 ENCOUNTER — OFFICE VISIT (OUTPATIENT)
Dept: SLEEP MEDICINE | Facility: HOSPITAL | Age: 62
End: 2017-10-27
Attending: INTERNAL MEDICINE

## 2017-10-27 VITALS
DIASTOLIC BLOOD PRESSURE: 73 MMHG | BODY MASS INDEX: 28.96 KG/M2 | HEIGHT: 73 IN | SYSTOLIC BLOOD PRESSURE: 114 MMHG | OXYGEN SATURATION: 93 % | WEIGHT: 218.5 LBS | HEART RATE: 64 BPM

## 2017-10-27 DIAGNOSIS — G47.33 OBSTRUCTIVE SLEEP APNEA, ADULT: Primary | ICD-10-CM

## 2017-10-27 DIAGNOSIS — E66.9 OBESITY (BMI 30-39.9): ICD-10-CM

## 2017-10-27 DIAGNOSIS — J30.9 CHRONIC ALLERGIC RHINITIS, UNSPECIFIED SEASONALITY, UNSPECIFIED TRIGGER: ICD-10-CM

## 2017-10-27 PROCEDURE — G0463 HOSPITAL OUTPT CLINIC VISIT: HCPCS

## 2017-11-02 DIAGNOSIS — N52.9 ERECTILE DYSFUNCTION, UNSPECIFIED ERECTILE DYSFUNCTION TYPE: ICD-10-CM

## 2017-11-02 RX ORDER — SILDENAFIL 50 MG/1
50 TABLET, FILM COATED ORAL DAILY PRN
Qty: 10 TABLET | Refills: 4 | Status: SHIPPED | OUTPATIENT
Start: 2017-11-02 | End: 2017-11-07 | Stop reason: SDUPTHER

## 2017-11-07 DIAGNOSIS — N52.9 ERECTILE DYSFUNCTION, UNSPECIFIED ERECTILE DYSFUNCTION TYPE: ICD-10-CM

## 2017-11-07 RX ORDER — SILDENAFIL 50 MG/1
50 TABLET, FILM COATED ORAL DAILY PRN
Qty: 24 TABLET | Refills: 3 | Status: SHIPPED | OUTPATIENT
Start: 2017-11-07 | End: 2018-07-29 | Stop reason: SDUPTHER

## 2017-11-09 ENCOUNTER — DOCUMENTATION (OUTPATIENT)
Dept: SLEEP MEDICINE | Facility: HOSPITAL | Age: 62
End: 2017-11-09

## 2017-11-09 PROBLEM — E66.9 OBESITY (BMI 30-39.9): Status: ACTIVE | Noted: 2017-11-09

## 2017-11-09 PROBLEM — J30.9 CHRONIC ALLERGIC RHINITIS: Status: ACTIVE | Noted: 2017-11-09

## 2017-11-09 PROBLEM — G47.33 OBSTRUCTIVE SLEEP APNEA, ADULT: Status: ACTIVE | Noted: 2017-11-09

## 2017-11-09 NOTE — PROGRESS NOTES
Receive download from [employer] from 10/6/17 through 11/4/17.  Patient has 100% compliance.  Average CPAP pressure 7-8 cm.  AHI is 1.9.  Unclear if this is on his new device as patient was seen on 10/27/17 and a new device was ordered for him.  We will need to review again at next office visit.

## 2017-11-09 NOTE — PROGRESS NOTES
"[facility]  Telephone: [phone] / Fax: [phone] [place]  Telephone: [phone] / Fax: [phone] Judy Mayberry    PCP: Nakita Rodriguez MD    Reason for visit: JUAN f/u    Uriel Garcia was last seen at [facility] sleep lab in September 2016 and is here today to review his progress on the CPAP device. His sleep schedule is 08:30pm-5:40am. His machine is making noises and whistles. It is over 5 years old. He uses nasal pillows and changes them every 6 months. He denies snoring or EDS with the CPAP.     SH- no tobacco, drinks 5 alcoholic drinks per week.    ROS- +anxiety, rest is unremarkable.    Current Medications:    Current Outpatient Prescriptions:   •  atorvastatin (LIPITOR) 10 MG tablet, Take 1 tablet by mouth Daily. (Patient taking differently: Take 10 mg by mouth Every Night.), Disp: 90 tablet, Rfl: 3  •  meclizine (ANTIVERT) 25 MG tablet, Take 1 tablet by mouth 3 (Three) Times a Day As Needed for dizziness., Disp: 30 tablet, Rfl: 0  •  oxyCODONE-acetaminophen (PERCOCET) 5-325 MG per tablet, Take 1-2 tablets by mouth Every 4 (Four) Hours As Needed (Pain)., Disp: 40 tablet, Rfl: 0  •  sildenafil (VIAGRA) 50 MG tablet, Take 1 tablet by mouth Daily As Needed for erectile dysfunction., Disp: 24 tablet, Rfl: 3   also entered in Sleep Questionnaire    Patient  has a past medical history of Anxiety; Arthritis; Benign colonic polyp; BPPV (benign paroxysmal positional vertigo) (10/20/2016); Hyperlipidemia; Limited joint range of motion; and Sleep apnea.    I have reviewed the Past Medical History, Past Surgical History, Social History and Family History.            ESS  2   Vital Signs Vitals:    10/27/17 1446   BP: 114/73   Pulse: 64   SpO2: 93%   Weight: 218 lb 8 oz (99.1 kg)   Height: 73\" (185.4 cm)    Body mass index is 28.83 kg/(m^2).    General Alert and oriented. No acute distress noted   Pharynx/Throat Class IV Mallampati airway, large tongue, no evidence of redundant lateral " pharyngeal tissue. No oral lesions. No thrush. Moist mucous membranes..   Head Normocephalic. Symmetrical. Atraumatic.    Nose No septal deviation. No drainage   Chest Wall Normal shape. Symmetric expansion with respiration. No tenderness.   Neck Trachea midline, no thyromegaly or adenopathy    Lungs Clear to auscultation bilaterally. No wheezes. No rhonchi. No rales. Respirations regular, even and unlabored.   Heart Regular rhythm and normal rate. Normal S1 and S2. No murmur   Abdomen Soft, non-tender and non-distended. Normal bowel sounds. No masses.   Extremities Moves all extremities well. No edema   Psychiatric Normal mood and affect.     Testing:  · Download - there is no data on the card since Sep 2016. New card was issued today to the patient.      Impression:  1. Obstructive sleep apnea, adult    2. Obesity (BMI 30-39.9)    3. Chronic allergic rhinitis, unspecified seasonality, unspecified trigger          Plan:  Patient uses the CPAP device and benefits from its use in terms of reduction of hypersomnia and snoring. His current device is over 5 years old. It is making whistling sounds and noises and is broken beyond repair. I will order a new device from [employer] at 5-15cm water and renew all his supplies.    Weight loss will be strongly beneficial to reduce the severity of sleep-disordered breathing.  Caution during activities that require prolonged concentration is strongly advised if sleepiness returns. Changing of PAP supplies regularly is important for effective use. Patient needs to change cushion on the mask or plugs on nasal pillows along with disposable filters once every month and change mask frame, tubing, headgear and Velcro straps every 6 months at the minimum.    Receive download from [employer] from 10/6/17 through 11/4/17.  Patient has 100% compliance.  Average CPAP pressure 7-8 cm.  AHI is 1.9.      Follow up in 2-3 months    Thank you for allowing me to participate in your patient's  care.      JONH Heath  Dictating for Dr. [facility]  Phone: [phone]      Part of this note may be an electronic transcription/translation of spoken language to printed text using the Dragon Dictation System.

## 2017-12-06 ENCOUNTER — DOCUMENTATION (OUTPATIENT)
Dept: PHYSICAL THERAPY | Facility: HOSPITAL | Age: 62
End: 2017-12-06

## 2017-12-06 DIAGNOSIS — M25.511 RIGHT SHOULDER PAIN, UNSPECIFIED CHRONICITY: ICD-10-CM

## 2017-12-06 DIAGNOSIS — Z47.89 ORTHOPEDIC AFTERCARE: Primary | ICD-10-CM

## 2017-12-06 DIAGNOSIS — Z74.09 IMPAIRED MOBILITY: ICD-10-CM

## 2017-12-06 NOTE — THERAPY DISCHARGE NOTE
Outpatient Physical Therapy Discharge Summary         Patient Name: Uriel Garcia  : 1955  MRN: [id number]    Today's Date: 2017    Visit Dx:    ICD-10-CM ICD-9-CM   1. Orthopedic aftercare Z47.89 V54.9   2. Right shoulder pain, unspecified chronicity M25.511 719.41   3. Impaired mobility Z74.09 799.89             PT OP Goals       17 1300       PT Short Term Goals    STG Date to Achieve 17  -GJ     STG 1 pt. to be I with initial HEP to facilitate self management of their condition  -GJ     STG 1 Progress Met  -GJ     STG 2 pt. to be educated in/verbalize understanding of the importance of posture/ergonomics in association with their condition to facilitate self management of their condition  -GJ     STG 2 Progress Met  -GJ     Long Term Goals    LTG Date to Achieve 10/13/17  -GJ     LTG 1 pt. to be I with advanced HEP to facilitate self management of their condition  -GJ     LTG 1 Progress Met  -GJ     LTG 2 pt. to report a DASH </= 25% to demonstrate decreased level of perceived disability  -GJ     LTG 2 Progress Not Met  -GJ     LTG 2 Progress Comments not reassessed  -GJ     LTG 3 pt. to demonstrate R shoulder flexion AROM >/= 0-150 to facilitate ease of performing functional/household activities  -GJ     LTG 3 Progress Not Met  -GJ     LTG 4 pt. to demonstrate R shoulder abd AROM >/= 0-140 to facilitate ease of performing functional/household activities  -GJ     LTG 4 Progress Partially Met  -GJ     LTG 5 pt. to demonstrate R shoulder AROM FIR >/= mid line L2/3 to facilitate ease of performing self care/dressing activities  -GJ     LTG 5 Progress Partially Met  -GJ     LTG 6 pt. to demonstrate placing/retrieving small object from an above the shoulder level shelf with his RUE to facilitate ease of performing household/work related activities  -GJ     LTG 6 Progress Not Met  -GJ       User Key  (r) = Recorded By, (t) = Taken By, (c) = Cosigned By    Initials Name Provider Type     GJ Simon Ruiz, PT Physical Therapist          OP PT Discharge Summary  Date of Discharge: 12/06/17  Reason for Discharge:  (did not return after his vacation)  Outcomes Achieved: Patient able to partially acheive established goals  Discharge Destination: Home with home program, Unknown  Discharge Instructions: Mr. Garcia attended 9 sessions of physical therapy from 9/15/17-10/2/17 following his R shoulder manipulation.  He did not return to the [facility] following his vacation.  At the time of his last session, he continued to demonstrate some limitations in his R shoulder ROM with compensations. Mr. Garcia is discharged secondary to not returning to the clinic.       Time Calculation:                    Simon Ruiz, PT  12/6/2017

## 2018-01-05 ENCOUNTER — OFFICE VISIT (OUTPATIENT)
Dept: SLEEP MEDICINE | Facility: HOSPITAL | Age: 63
End: 2018-01-05
Attending: INTERNAL MEDICINE

## 2018-01-05 VITALS
SYSTOLIC BLOOD PRESSURE: 108 MMHG | OXYGEN SATURATION: 98 % | BODY MASS INDEX: 28.76 KG/M2 | HEART RATE: 64 BPM | DIASTOLIC BLOOD PRESSURE: 70 MMHG | HEIGHT: 73 IN | TEMPERATURE: 96.6 F | WEIGHT: 217 LBS

## 2018-01-05 DIAGNOSIS — G47.33 OBSTRUCTIVE SLEEP APNEA: Primary | ICD-10-CM

## 2018-01-05 PROCEDURE — G0463 HOSPITAL OUTPT CLINIC VISIT: HCPCS

## 2018-01-05 NOTE — PROGRESS NOTES
"[facility] SLEEP MEDICINE  [address]  3rd [address]  [facility] [address]  [phone]    PCP: Nakita Rodriguez MD    Reason for visit:  Sleep disorders: JUAN    Uriel Garcia is a 62 y.o.male who was seen in the [facility] today. He got a new machine and it is working well. Sleeps from 9p to 5:30a. ESS is 2. Using nasal mask, fits well, no leaks, no dry mouth. He likes the new machine. He wakes up rested. No other health changes.    No cigs, 5 alc per week, 1 coffee per day.    Current Medications:    Current Outpatient Prescriptions:   •  atorvastatin (LIPITOR) 10 MG tablet, Take 1 tablet by mouth Daily. (Patient taking differently: Take 10 mg by mouth Every Night.), Disp: 90 tablet, Rfl: 3  •  meclizine (ANTIVERT) 25 MG tablet, Take 1 tablet by mouth 3 (Three) Times a Day As Needed for dizziness., Disp: 30 tablet, Rfl: 0  •  oxyCODONE-acetaminophen (PERCOCET) 5-325 MG per tablet, Take 1-2 tablets by mouth Every 4 (Four) Hours As Needed (Pain)., Disp: 40 tablet, Rfl: 0  •  sildenafil (VIAGRA) 50 MG tablet, Take 1 tablet by mouth Daily As Needed for erectile dysfunction., Disp: 24 tablet, Rfl: 3   also entered in Sleep Questionnaire    Patient  has a past medical history of Anxiety; Arthritis; Benign colonic polyp; BPPV (benign paroxysmal positional vertigo) (10/20/2016); Hyperlipidemia; Limited joint range of motion; and Sleep apnea.   I have reviewed the Past Medical History, Past Surgical History, Social History and Family History in EPIC and Sleep Questionnaire.    Pertinent Positive Review of Systems of denies  Rest of Review of Systems was negative as recorded in Sleep Questionnaire.        Vital Signs: /70 (BP Location: Left arm, Patient Position: Sitting)  Pulse 64  Temp 96.6 °F (35.9 °C) (Oral)   Ht 185.4 cm (73\")  Wt 98.4 kg (217 lb)  SpO2 98%  BMI 28.63 kg/m2        General: Alert. Cooperative. Well developed. No acute distress.             Head:  Normocephalic. " Symmetrical. Atraumatic.              Nose: No septal deviation. No drainage.          Throat: No oral lesions. No thrush. Moist mucous membranes.    Tongue is large and dentition is intact       Pharynx: Posterior pharyngeal pillars are narrow with Mallampati score of Class IV     Chest Wall:  Normal shape. Symmetric expansion with respiration. No tenderness.             Neck:  Trachea midline.           Lungs:  Clear to auscultation bilaterally. No wheezes. No rhonchi. No rales. Respirations regular, even and unlabored.            Heart:  Regular rhythm and normal rate. Normal S1 and S2. No murmur.     Abdomen:  Soft, non-tender and non-distended. Normal bowel sounds. No masses.  Extremities:  Moves all extremities well. No edema.    Psychiatric: Normal mood and affect.    Study:  · 3/6/14  FINDINGS: Diagnostic study from 9:42 p.m. to 5:55 a.m. Sleep efficiency was  somewhat poor at 77%, but there was adequate sleep time of 6.33 hours for  diagnostic purposes. Sleep distribution showed 7.8% stage I sleep, 69.2% stage  II sleep, 12.8% slow-wave sleep, 10.3% REM sleep. Apnea-hypopnea index is 14.7  events an hour indicating moderately severe obstructive sleep apnea which is  almost severe by respiratory disturbance index criteria at 28 events an hour.  In the supine position, very, very severe obstructive sleep apnea is present  with AHI of 76.9. In REM sleep, AHI was 10.8 with RDI of 17. Oxygen saturation  fell below 90% for 6% of total sleep time indicating significant sleep-related  hypoxemia. Arousal index high at 41 and patient had 68.12% time snoring.       Testing:  · 90 day compliance 88.9%.  Average usage 9 hours 5 minutes.  Set pressure is auto 5-15 cm.  Average CPAP pressure is 6-8 cm.  AHI is 2.2.    [employer]    Impression:  1. Obstructive sleep apnea        Plan:  Patient obtained a new machine and it is comfortable and quiet.  He finds the mask comfortable as well with no air leaks a dry  mouth.  He is rested.  He is aware of health risks of untreated sleep apnea.  He will remain with device and is aware he needs to change supplies regularly.  He is going to work on losing weight.    I reiterated the importance of effective treatment of obstructive sleep apnea with PAP machine.  Cardiovascular health risks of untreated sleep apnea were again reviewed.  Patient was asked to remain cautious if there is persistent hypersomnolence.    Change of PAP supplies regularly is important for effective use.  Change of cushion on the mask or plugs on nasal pillows along with disposable filters once every month and change of mask frame, tubing, headgear and Velcro straps every 6 months at the minimum was reiterated.    This patient is compliant with PAP machine and benefits from its use.  Apnea hypopneas index is corrected/improved.  Daytime hypersomnolence has resolved.     Patient will follow up in this clinic in 1 year with APRN.    Thank you for allowing me to participate in your patient's care.    Chuy Arambula MD  [facility] SLEEP MEDICINE  [address] [place] [address]  [address] [place] [address]  Dept: [phone]  Dept Fax: [phone]  Loc: [phone]    Part of this note may be an electronic transcription/translation of spoken language to printed text using the Dragon Dictation System.

## 2018-01-24 RX ORDER — ATORVASTATIN CALCIUM 10 MG/1
TABLET, FILM COATED ORAL
Qty: 90 TABLET | Refills: 3 | Status: SHIPPED | OUTPATIENT
Start: 2018-01-24 | End: 2019-01-21 | Stop reason: SDUPTHER

## 2018-02-16 DIAGNOSIS — Z12.5 SCREENING FOR PROSTATE CANCER: ICD-10-CM

## 2018-02-16 DIAGNOSIS — E78.2 MIXED HYPERLIPIDEMIA: ICD-10-CM

## 2018-02-16 DIAGNOSIS — Z00.00 HEALTH CARE MAINTENANCE: Primary | ICD-10-CM

## 2018-02-21 LAB
ALBUMIN SERPL-MCNC: 4.9 G/DL (ref 3.5–5.2)
ALBUMIN/GLOB SERPL: 2 G/DL
ALP SERPL-CCNC: 71 U/L (ref 39–117)
ALT SERPL-CCNC: 28 U/L (ref 1–41)
APPEARANCE UR: CLEAR
AST SERPL-CCNC: 22 U/L (ref 1–40)
BACTERIA #/AREA URNS HPF: NORMAL /[HPF]
BASOPHILS # BLD AUTO: 0.02 10*3/MM3 (ref 0–0.2)
BASOPHILS NFR BLD AUTO: 0.3 % (ref 0–1.5)
BILIRUB SERPL-MCNC: 0.9 MG/DL (ref 0.1–1.2)
BILIRUB UR QL STRIP: NEGATIVE
BUN SERPL-MCNC: 13 MG/DL (ref 8–23)
BUN/CREAT SERPL: 11.7 (ref 7–25)
CALCIUM SERPL-MCNC: 9.4 MG/DL (ref 8.6–10.5)
CHLORIDE SERPL-SCNC: 99 MMOL/L (ref 98–107)
CHOLEST SERPL-MCNC: 267 MG/DL (ref 0–200)
CO2 SERPL-SCNC: 24.9 MMOL/L (ref 22–29)
COLOR UR: YELLOW
CREAT SERPL-MCNC: 1.11 MG/DL (ref 0.76–1.27)
EOSINOPHIL # BLD AUTO: 0.09 10*3/MM3 (ref 0–0.7)
EOSINOPHIL NFR BLD AUTO: 1.2 % (ref 0.3–6.2)
EPI CELLS #/AREA URNS HPF: NORMAL /HPF
ERYTHROCYTE [DISTWIDTH] IN BLOOD BY AUTOMATED COUNT: 12.5 % (ref 11.5–14.5)
GFR SERPLBLD CREATININE-BSD FMLA CKD-EPI: 67 ML/MIN/1.73
GFR SERPLBLD CREATININE-BSD FMLA CKD-EPI: 82 ML/MIN/1.73
GLOBULIN SER CALC-MCNC: 2.4 GM/DL
GLUCOSE SERPL-MCNC: 88 MG/DL (ref 65–99)
GLUCOSE UR QL: NEGATIVE
HBA1C MFR BLD: 5.33 % (ref 4.8–5.6)
HCT VFR BLD AUTO: 48.4 % (ref 40.4–52.2)
HDLC SERPL-MCNC: 56 MG/DL (ref 40–60)
HGB BLD-MCNC: 15.7 G/DL (ref 13.7–17.6)
HGB UR QL STRIP: NEGATIVE
IMM GRANULOCYTES # BLD: 0.04 10*3/MM3 (ref 0–0.03)
IMM GRANULOCYTES NFR BLD: 0.5 % (ref 0–0.5)
KETONES UR QL STRIP: NEGATIVE
LDLC SERPL CALC-MCNC: 175 MG/DL (ref 0–100)
LEUKOCYTE ESTERASE UR QL STRIP: NEGATIVE
LYMPHOCYTES # BLD AUTO: 1.97 10*3/MM3 (ref 0.9–4.8)
LYMPHOCYTES NFR BLD AUTO: 26.7 % (ref 19.6–45.3)
MCH RBC QN AUTO: 29.8 PG (ref 27–32.7)
MCHC RBC AUTO-ENTMCNC: 32.4 G/DL (ref 32.6–36.4)
MCV RBC AUTO: 91.8 FL (ref 79.8–96.2)
MICRO URNS: NORMAL
MICRO URNS: NORMAL
MONOCYTES # BLD AUTO: 0.72 10*3/MM3 (ref 0.2–1.2)
MONOCYTES NFR BLD AUTO: 9.8 % (ref 5–12)
NEUTROPHILS # BLD AUTO: 4.54 10*3/MM3 (ref 1.9–8.1)
NEUTROPHILS NFR BLD AUTO: 61.5 % (ref 42.7–76)
NITRITE UR QL STRIP: NEGATIVE
PH UR STRIP: 7 [PH] (ref 5–7.5)
PLATELET # BLD AUTO: 193 10*3/MM3 (ref 140–500)
POTASSIUM SERPL-SCNC: 4.5 MMOL/L (ref 3.5–5.2)
PROT SERPL-MCNC: 7.3 G/DL (ref 6–8.5)
PROT UR QL STRIP: NEGATIVE
PSA SERPL-MCNC: 0.93 NG/ML (ref 0–4)
RBC # BLD AUTO: 5.27 10*6/MM3 (ref 4.6–6)
RBC #/AREA URNS HPF: NORMAL /HPF
SODIUM SERPL-SCNC: 140 MMOL/L (ref 136–145)
SP GR UR: 1.02 (ref 1–1.03)
TRIGL SERPL-MCNC: 178 MG/DL (ref 0–150)
TSH SERPL DL<=0.005 MIU/L-ACNC: 3.23 MIU/ML (ref 0.27–4.2)
URINALYSIS REFLEX: NORMAL
UROBILINOGEN UR STRIP-MCNC: 0.2 MG/DL (ref 0.2–1)
VLDLC SERPL CALC-MCNC: 35.6 MG/DL (ref 5–40)
WBC # BLD AUTO: 7.38 10*3/MM3 (ref 4.5–10.7)
WBC #/AREA URNS HPF: NORMAL /HPF

## 2018-02-27 ENCOUNTER — OFFICE VISIT (OUTPATIENT)
Dept: INTERNAL MEDICINE | Facility: CLINIC | Age: 63
End: 2018-02-27

## 2018-02-27 VITALS
BODY MASS INDEX: 28.49 KG/M2 | DIASTOLIC BLOOD PRESSURE: 72 MMHG | OXYGEN SATURATION: 99 % | HEIGHT: 73 IN | WEIGHT: 215 LBS | SYSTOLIC BLOOD PRESSURE: 114 MMHG | HEART RATE: 71 BPM | RESPIRATION RATE: 18 BRPM

## 2018-02-27 DIAGNOSIS — Z00.00 HEALTH MAINTENANCE EXAMINATION: Primary | ICD-10-CM

## 2018-02-27 DIAGNOSIS — E78.2 MIXED HYPERLIPIDEMIA: ICD-10-CM

## 2018-02-27 PROBLEM — G89.29 CHRONIC RIGHT SHOULDER PAIN: Status: RESOLVED | Noted: 2017-08-02 | Resolved: 2018-02-27

## 2018-02-27 PROBLEM — E66.9 OBESITY (BMI 30-39.9): Status: RESOLVED | Noted: 2017-11-09 | Resolved: 2018-02-27

## 2018-02-27 PROBLEM — M25.511 CHRONIC RIGHT SHOULDER PAIN: Status: RESOLVED | Noted: 2017-08-02 | Resolved: 2018-02-27

## 2018-02-27 PROCEDURE — 99396 PREV VISIT EST AGE 40-64: CPT | Performed by: INTERNAL MEDICINE

## 2018-02-27 NOTE — PROGRESS NOTES
Chief Complaint  Uriel Garcia is a 62 y.o. male who presents for Annual Exam (CPE)  .    History of Present Illness   Uriel is here for routine CPE.  His left knee has been bothering him some.  He plays tennis with 30 year olds.   He does not want to do anything about it right now.  He had PVCs pre and post op when he had his shoulder surgery.  He occasionally has a palpitation.  He exercises without any issue.  He doesn't have any cp or palp with exercising.    Prostate Exam: declines  C-scope: 2014 due 2019?  He had a tubular adenoma  He is going to contact Dr. Skaggs's office to see when he is due for a repeat C-scope.      Exercise: 3-4 times a week 30-90 minutes at a time.    Immunization History   Administered Date(s) Administered   • Flu Vaccine Quad PF >18YRS 10/20/2016   • Influenza TIV (IM) 11/17/2015   • Influenza, Quadrivalent 10/01/2017   • Td 08/03/2015       Review of Systems   Constitution: Negative for chills, fever and malaise/fatigue.   HENT: Negative for hearing loss, hoarse voice and sore throat.    Eyes: Negative for blurred vision, double vision and visual disturbance.   Cardiovascular: Positive for palpitations (occasionally). Negative for chest pain and leg swelling.   Respiratory: Negative for cough and shortness of breath.    Endocrine: Negative for polydipsia and polyuria.   Hematologic/Lymphatic: Does not bruise/bleed easily.   Skin: Negative for rash and suspicious lesions.   Musculoskeletal: Positive for joint pain. Negative for arthritis and myalgias.   Gastrointestinal: Negative for bloating, abdominal pain, change in bowel habit, constipation, diarrhea, dysphagia, hematochezia, melena, nausea and vomiting.   Genitourinary: Negative for dysuria and hematuria.   Neurological: Positive for dizziness (vertigo occ) and vertigo. Negative for headaches and light-headedness.   Psychiatric/Behavioral: Negative for depression. The patient is not nervous/anxious.        Patient Active Problem  List   Diagnosis   • Anxiety   • Hyperlipidemia   • Right elbow pain   • Right knee pain   • Chronic pain of left heel   • Decreased range of motion of right shoulder   • Decreased range of motion of right elbow   • Erectile dysfunction   • Obstructive sleep apnea, adult   • Chronic allergic rhinitis       Past Medical History:   Diagnosis Date   • Anxiety    • Arthritis    • Benign colonic polyp    • BPPV (benign paroxysmal positional vertigo) 10/20/2016   • Hyperlipidemia     was on higher dose of Lipitor in past but had elevated LFTs. Added Zetia and now in good control   • Limited joint range of motion     RT SHOULDER   • Sleep apnea     uses CPAP       Past Surgical History:   Procedure Laterality Date   • COLONOSCOPY  2014    Dr King/EBONY   • HERNIA REPAIR Right 2007    Dr Garnett/EBONY   • JOINT MANIPULATION Right 9/14/2017    Procedure: RIGHT SHOULDER MANIPULATION;  Surgeon: Jann Barger MD;  Location: [facility];  Service:    • SHOULDER ARTHROSCOPY Right 9/14/2017    Procedure: ARTHROSCOPY LYSIS OF ADHESIONS AND BURSECTOMY RIGHT SHOULDER;  Surgeon: Jann Barger MD;  Location: [facility] [place];  Service:        Family History   Problem Relation Age of Onset   • Hypertension Father    • Heart attack Maternal Grandmother    • Heart disease Maternal Grandmother      ASCVD   • Depression Other      siblings   • Diabetes Other    • Diabetes Sister    • Alcohol abuse Brother    • Diabetes Brother    • Hodgkin's lymphoma Maternal Grandfather    • Malig Hyperthermia Neg Hx        Social History     Social History   • Marital status:      Spouse name: Mamta   • Number of children: 2   • Years of education: N/A     Occupational History   •       Social History Main Topics   • Smoking status: Never Smoker   • Smokeless tobacco: Never Used   • Alcohol use Yes      Comment: social about 8 drinks a week   • Drug use: No   • Sexual activity: Defer     Other Topics Concern   • Not on file  "    Social History Narrative       Current Outpatient Prescriptions on File Prior to Visit   Medication Sig Dispense Refill   • atorvastatin (LIPITOR) 10 MG tablet TAKE 1 TABLET DAILY 90 tablet 3   • sildenafil (VIAGRA) 50 MG tablet Take 1 tablet by mouth Daily As Needed for erectile dysfunction. 24 tablet 3   • [DISCONTINUED] meclizine (ANTIVERT) 25 MG tablet Take 1 tablet by mouth 3 (Three) Times a Day As Needed for dizziness. 30 tablet 0   • [DISCONTINUED] oxyCODONE-acetaminophen (PERCOCET) 5-325 MG per tablet Take 1-2 tablets by mouth Every 4 (Four) Hours As Needed (Pain). 40 tablet 0     No current facility-administered medications on file prior to visit.        No Known Allergies    Vitals:    02/27/18 0824   BP: 114/72   Pulse: 71   Resp: 18   SpO2: 99%   Weight: 97.5 kg (215 lb)   Height: 185.4 cm (72.99\")       Body mass index is 28.37 kg/(m^2).    Objective   Physical Exam   Constitutional: He is oriented to person, place, and time. He appears well-developed and well-nourished. No distress.   HENT:   Head: Normocephalic and atraumatic.   Right Ear: Hearing and external ear normal.   Left Ear: Hearing and external ear normal.   Nose: Nose normal.   Mouth/Throat: Oropharynx is clear and moist and mucous membranes are normal.   Eyes: Conjunctivae, EOM and lids are normal. Pupils are equal, round, and reactive to light. No scleral icterus.   Neck: Trachea normal and normal range of motion. Neck supple.   Cardiovascular: Normal rate, regular rhythm and normal heart sounds.  Exam reveals no gallop and no friction rub.    No murmur heard.  Pulses:       Carotid pulses are 2+ on the right side, and 2+ on the left side.       Femoral pulses are 2+ on the right side, and 2+ on the left side.       Dorsalis pedis pulses are 2+ on the right side, and 2+ on the left side.        Posterior tibial pulses are 2+ on the right side, and 2+ on the left side.   Pulmonary/Chest: Effort normal and breath sounds normal. No " respiratory distress. He has no wheezes. He has no rales.   Abdominal: Soft. Normal appearance and bowel sounds are normal. He exhibits no distension and no mass. There is no tenderness.   Musculoskeletal: Normal range of motion. He exhibits no edema.       Vascular Status -  His exam exhibits no right foot edema. His exam exhibits no left foot edema.   Skin Integrity  -  His right foot skin is intact.     Uriel 's left foot skin is intact. .  Lymphadenopathy:     He has no cervical adenopathy.        Right: No inguinal and no supraclavicular adenopathy present.        Left: No inguinal and no supraclavicular adenopathy present.   Neurological: He is alert and oriented to person, place, and time. He has normal strength. No cranial nerve deficit. He exhibits normal muscle tone. Coordination and gait normal.   Skin: Skin is warm and dry. No rash noted.   Psychiatric: He has a normal mood and affect. His speech is normal and behavior is normal. Judgment and thought content normal. Cognition and memory are normal.   Vitals reviewed.        Results for orders placed or performed in visit on 02/16/18   Comprehensive Metabolic Panel   Result Value Ref Range    Glucose 88 65 - 99 mg/dL    BUN 13 8 - 23 mg/dL    Creatinine 1.11 0.76 - 1.27 mg/dL    eGFR Non African Am 67 >60 mL/min/1.73    eGFR African Am 82 >60 mL/min/1.73    BUN/Creatinine Ratio 11.7 7.0 - 25.0    Sodium 140 136 - 145 mmol/L    Potassium 4.5 3.5 - 5.2 mmol/L    Chloride 99 98 - 107 mmol/L    Total CO2 24.9 22.0 - 29.0 mmol/L    Calcium 9.4 8.6 - 10.5 mg/dL    Total Protein 7.3 6.0 - 8.5 g/dL    Albumin 4.90 3.50 - 5.20 g/dL    Globulin 2.4 gm/dL    A/G Ratio 2.0 g/dL    Total Bilirubin 0.9 0.1 - 1.2 mg/dL    Alkaline Phosphatase 71 39 - 117 U/L    AST (SGOT) 22 1 - 40 U/L    ALT (SGPT) 28 1 - 41 U/L   Lipid Panel   Result Value Ref Range    Total Cholesterol 267 (H) 0 - 200 mg/dL    Triglycerides 178 (H) 0 - 150 mg/dL    HDL Cholesterol 56 40 - 60 mg/dL     VLDL Cholesterol 35.6 5 - 40 mg/dL    LDL Cholesterol  175 (H) 0 - 100 mg/dL   TSH Rfx On Abnormal To Free T4   Result Value Ref Range    TSH 3.23 0.27 - 4.2 mIU/mL   Urinalysis With / Culture If Indicated - Urine, Clean Catch   Result Value Ref Range    Specific Gravity, UA 1.016 1.005 - 1.030    pH, UA 7.0 5.0 - 7.5    Color, UA Yellow Yellow    Appearance, UA Clear Clear    Leukocytes, UA Negative Negative    Protein Negative Negative/Trace    Glucose, UA Negative Negative    Ketones Negative Negative    Blood, UA Negative Negative    Bilirubin, UA Negative Negative    Urobilinogen, UA 0.2 0.2 - 1.0 mg/dL    Nitrite, UA Negative Negative    Microscopic Examination Comment     MICROSCOPIC EXAMINATION See below:     Urinalysis Reflex Comment    Hemoglobin A1c   Result Value Ref Range    Hemoglobin A1C 5.33 4.80 - 5.60 %   PSA DIAGNOSTIC   Result Value Ref Range    PSA 0.929 0.000 - 4.000 ng/mL   Microscopic Examination   Result Value Ref Range    WBC, UA 0-5 0 - 5 /hpf    RBC, UA None seen 0 - 2 /hpf    Epithelial Cells (non renal) None seen 0 - 10 /hpf    Bacteria, UA None seen None seen/Few   CBC & Differential   Result Value Ref Range    WBC 7.38 4.50 - 10.70 10*3/mm3    RBC 5.27 4.60 - 6.00 10*6/mm3    Hemoglobin 15.7 13.7 - 17.6 g/dL    Hematocrit 48.4 40.4 - 52.2 %    MCV 91.8 79.8 - 96.2 fL    MCH 29.8 27.0 - 32.7 pg    MCHC 32.4 (L) 32.6 - 36.4 g/dL    RDW 12.5 11.5 - 14.5 %    Platelets 193 140 - 500 10*3/mm3    Neutrophil Rel % 61.5 42.7 - 76.0 %    Lymphocyte Rel % 26.7 19.6 - 45.3 %    Monocyte Rel % 9.8 5.0 - 12.0 %    Eosinophil Rel % 1.2 0.3 - 6.2 %    Basophil Rel % 0.3 0.0 - 1.5 %    Neutrophils Absolute 4.54 1.90 - 8.10 10*3/mm3    Lymphocytes Absolute 1.97 0.90 - 4.80 10*3/mm3    Monocytes Absolute 0.72 0.20 - 1.20 10*3/mm3    Eosinophils Absolute 0.09 0.00 - 0.70 10*3/mm3    Basophils Absolute 0.02 0.00 - 0.20 10*3/mm3    Immature Granulocyte Rel % 0.5 0.0 - 0.5 %    Immature Grans Absolute 0.04  (H) 0.00 - 0.03 10*3/mm3       Assessment/Plan   Diagnoses and all orders for this visit:    Health maintenance examination  -     Cancel: Tdap Vaccine Greater Than or Equal To 6yo IM    Mixed hyperlipidemia        Discussion/Summary  Carlos is here for routine CPE.  He is up to date on health maintenance except for tdap and shingles vaccine.  Unfortunately, we are out of tdap today.  He will return to get this. His lipids are quite high but he did not take his lipitor when he was in [place] recently.  He is back to taking this.  We will repeat lfts in 6 mo.  Continue regular exercise.        Return in about 6 months (around 8/27/2018) for labs only; 1 year CPE.

## 2018-07-29 DIAGNOSIS — N52.9 ERECTILE DYSFUNCTION, UNSPECIFIED ERECTILE DYSFUNCTION TYPE: ICD-10-CM

## 2018-07-30 RX ORDER — SILDENAFIL 50 MG/1
TABLET, FILM COATED ORAL
Qty: 24 TABLET | Refills: 3 | Status: SHIPPED | OUTPATIENT
Start: 2018-07-30 | End: 2019-08-15 | Stop reason: SDUPTHER

## 2018-08-28 ENCOUNTER — TELEPHONE (OUTPATIENT)
Dept: INTERNAL MEDICINE | Facility: CLINIC | Age: 63
End: 2018-08-28

## 2018-08-28 NOTE — TELEPHONE ENCOUNTER
Pt called and stated that he had shoulder surgery several months ago, at the time he was having issues with his elbow, after the surgery the surgeon stated that he go back to them for a consult for the elbow.   The pt wanted to know if you recommend him see anybody else for his elbow issue, his shoulder surgeon was .    1.97

## 2018-08-29 DIAGNOSIS — E78.2 MIXED HYPERLIPIDEMIA: Primary | ICD-10-CM

## 2018-08-30 ENCOUNTER — TELEPHONE (OUTPATIENT)
Dept: INTERNAL MEDICINE | Facility: CLINIC | Age: 63
End: 2018-08-30

## 2018-08-30 LAB
ALBUMIN SERPL-MCNC: 5.1 G/DL (ref 3.5–5.2)
ALBUMIN/GLOB SERPL: 2.1 G/DL
ALP SERPL-CCNC: 73 U/L (ref 39–117)
ALT SERPL-CCNC: 24 U/L (ref 1–41)
AST SERPL-CCNC: 21 U/L (ref 1–40)
BASOPHILS # BLD AUTO: 0.02 10*3/MM3 (ref 0–0.2)
BASOPHILS NFR BLD AUTO: 0.3 % (ref 0–1.5)
BILIRUB SERPL-MCNC: 1.2 MG/DL (ref 0.1–1.2)
BUN SERPL-MCNC: 21 MG/DL (ref 8–23)
BUN/CREAT SERPL: 17.2 (ref 7–25)
CALCIUM SERPL-MCNC: 9.7 MG/DL (ref 8.6–10.5)
CHLORIDE SERPL-SCNC: 101 MMOL/L (ref 98–107)
CO2 SERPL-SCNC: 24.1 MMOL/L (ref 22–29)
CREAT SERPL-MCNC: 1.22 MG/DL (ref 0.76–1.27)
EOSINOPHIL # BLD AUTO: 0.13 10*3/MM3 (ref 0–0.7)
EOSINOPHIL NFR BLD AUTO: 1.8 % (ref 0.3–6.2)
ERYTHROCYTE [DISTWIDTH] IN BLOOD BY AUTOMATED COUNT: 12.8 % (ref 11.5–14.5)
GLOBULIN SER CALC-MCNC: 2.4 GM/DL
GLUCOSE SERPL-MCNC: 90 MG/DL (ref 65–99)
HCT VFR BLD AUTO: 49.7 % (ref 40.4–52.2)
HGB BLD-MCNC: 15.9 G/DL (ref 13.7–17.6)
IMM GRANULOCYTES # BLD: 0.02 10*3/MM3 (ref 0–0.03)
IMM GRANULOCYTES NFR BLD: 0.3 % (ref 0–0.5)
LYMPHOCYTES # BLD AUTO: 2.24 10*3/MM3 (ref 0.9–4.8)
LYMPHOCYTES NFR BLD AUTO: 30.9 % (ref 19.6–45.3)
MCH RBC QN AUTO: 29.5 PG (ref 27–32.7)
MCHC RBC AUTO-ENTMCNC: 32 G/DL (ref 32.6–36.4)
MCV RBC AUTO: 92.2 FL (ref 79.8–96.2)
MONOCYTES # BLD AUTO: 0.58 10*3/MM3 (ref 0.2–1.2)
MONOCYTES NFR BLD AUTO: 8 % (ref 5–12)
NEUTROPHILS # BLD AUTO: 4.26 10*3/MM3 (ref 1.9–8.1)
NEUTROPHILS NFR BLD AUTO: 58.7 % (ref 42.7–76)
PLATELET # BLD AUTO: 219 10*3/MM3 (ref 140–500)
POTASSIUM SERPL-SCNC: 4.2 MMOL/L (ref 3.5–5.2)
PROT SERPL-MCNC: 7.5 G/DL (ref 6–8.5)
RBC # BLD AUTO: 5.39 10*6/MM3 (ref 4.6–6)
SODIUM SERPL-SCNC: 140 MMOL/L (ref 136–145)
WBC # BLD AUTO: 7.25 10*3/MM3 (ref 4.5–10.7)

## 2018-08-30 NOTE — TELEPHONE ENCOUNTER
Pt called about his elbow xray that he had in our office. Stated he would stop by our office later for this.

## 2018-09-04 ENCOUNTER — TELEPHONE (OUTPATIENT)
Dept: INTERNAL MEDICINE | Facility: CLINIC | Age: 63
End: 2018-09-04

## 2018-09-04 NOTE — TELEPHONE ENCOUNTER
----- Message from Nakita Rodriguez MD sent at 9/4/2018 12:26 PM EDT -----  Please call - his blood counts are all ok.  Liver enzymes are normal.  His kidney function increased slightly but is still ok.  Stay well hydrated and avoid all NSAIDS.  Repeat in 6 mo

## 2019-01-18 ENCOUNTER — OFFICE VISIT (OUTPATIENT)
Dept: SLEEP MEDICINE | Facility: HOSPITAL | Age: 64
End: 2019-01-18
Attending: INTERNAL MEDICINE

## 2019-01-18 VITALS
HEIGHT: 74 IN | WEIGHT: 201 LBS | HEART RATE: 54 BPM | SYSTOLIC BLOOD PRESSURE: 112 MMHG | BODY MASS INDEX: 25.8 KG/M2 | OXYGEN SATURATION: 97 % | DIASTOLIC BLOOD PRESSURE: 72 MMHG

## 2019-01-18 DIAGNOSIS — G47.33 OBSTRUCTIVE SLEEP APNEA: Primary | ICD-10-CM

## 2019-01-18 PROCEDURE — G0463 HOSPITAL OUTPT CLINIC VISIT: HCPCS

## 2019-01-18 NOTE — PROGRESS NOTES
"[facility] SLEEP MEDICINE  [address] [facility]  3rd Floor  [facility] [address]  [phone]    PCP: Nakita Rodriguez MD    Reason for visit:  Sleep disorders: JUAN    Uriel is a 63 y.o.male who was seen in the Sleep Disorders Center today. He is compliant and machine is working well. Wakes up rested. Nasal pillows, no leaks, no dry mouth. Sleeps from 10p to 8:30a. No EDS.  [place] Sleepiness Scale is 2. Caffeine 2 per day. Alcohol 5 per week.    Uriel  reports that  has never smoked. he has never used smokeless tobacco.    Pertinent Positive Review of Systems of denies  Rest of Review of Systems was negative as recorded in Sleep Questionnaire.    Patient  has a past medical history of Anxiety, Arthritis, Benign colonic polyp, BPPV (benign paroxysmal positional vertigo) (10/20/2016), Hyperlipidemia, Limited joint range of motion, and Sleep apnea.     Current Medications:    Current Outpatient Medications:   •  atorvastatin (LIPITOR) 10 MG tablet, TAKE 1 TABLET DAILY, Disp: 90 tablet, Rfl: 3  •  sildenafil (VIAGRA) 50 MG tablet, TAKE 1 TABLET DAILY AS NEEDED FOR ERECTILE DYSFUNCTION, Disp: 24 tablet, Rfl: 3   also entered in Sleep Questionnaire         Vital Signs: /72 (BP Location: Left arm, Patient Position: Sitting)   Pulse 54   Ht 188 cm (74\")   Wt 91.2 kg (201 lb)   SpO2 97%   BMI 25.81 kg/m²     Body mass index is 25.81 kg/m².       Tongue: large      Dentition: good       Pharynx: Posterior pharyngeal pillars are narrow   Mallampatti: IV (only hard palate visible)        General: Alert. Cooperative. Well developed. No acute distress.             Head:  Normocephalic. Symmetrical. Atraumatic.              Nose: No septal deviation. No drainage.          Throat: No oral lesions. No thrush. Moist mucous membranes.    Chest Wall:  Normal shape. Symmetric expansion with respiration. No tenderness.             Neck:  Trachea midline.           Lungs:  Clear to auscultation bilaterally. No " wheezes. No rhonchi. No rales. Respirations regular, even and unlabored.            Heart:  Regular rhythm and normal rate. Normal S1 and S2. No murmur.     Abdomen:  Soft, non-tender and non-distended. Normal bowel sounds. No masses.  Extremities:  Moves all extremities well. No edema.    Psychiatric: Normal mood and affect.    Study:  · 3/6/14  FINDINGS: Diagnostic study from 9:42 p.m. to 5:55 a.m. Sleep efficiency was  somewhat poor at 77%, but there was adequate sleep time of 6.33 hours for  diagnostic purposes. Sleep distribution showed 7.8% stage I sleep, 69.2% stage  II sleep, 12.8% slow-wave sleep, 10.3% REM sleep. Apnea-hypopnea index is 14.7  events an hour indicating moderately severe obstructive sleep apnea which is  almost severe by respiratory disturbance index criteria at 28 events an hour.  In the supine position, very, very severe obstructive sleep apnea is present  with AHI of 76.9. In REM sleep, AHI was 10.8 with RDI of 17. Oxygen saturation  fell below 90% for 6% of total sleep time indicating significant sleep-related  hypoxemia. Arousal index high at 41 and patient had 68.12% time snoring.     Testing:  · 98.9% compliance last 90 days with average usage 9 hours 42 minutes.  AHI 2.0 with average pressures 6-7 cm.  Auto CPAP between 5 and 15 cm.    DME Company: [employer]    Impression:  1. Obstructive sleep apnea        Plan:  Uriel is compliant and benefits from use of machine.  He changes his nasal pillows on scheduled with [place].  He wakes up rested and has no residual daytime sleepiness.    I reiterated the importance of effective treatment of obstructive sleep apnea with PAP machine.  Cardiovascular health risks of untreated sleep apnea were again reviewed.  Patient was asked to remain cautious if there is persistent hypersomnolence. The benefit of weight loss in reducing severity of obstructive sleep apnea was discussed.  Patient would benefit from adhering to a strict diet to achieve ideal  BMI.     Change of PAP supplies regularly is important for effective use.  Change of cushion on the mask or plugs on nasal pillows along with disposable filters once every month and change of mask frame, tubing, headgear and Velcro straps every 6 months at the minimum was reiterated.    This patient is compliant with PAP machine and benefits from its use.  Apnea hypopneas index is corrected/improved.  Daytime hypersomnolence has resolved.     Patient will follow up in this clinic in 1 year APRN    Thank you for allowing me to participate in your patient's care.    Chuy Arambula MD    Part of this note may be an electronic transcription/translation of spoken language to printed text using the Dragon Dictation System.

## 2019-01-21 RX ORDER — ATORVASTATIN CALCIUM 10 MG/1
TABLET, FILM COATED ORAL
Qty: 90 TABLET | Refills: 1 | Status: SHIPPED | OUTPATIENT
Start: 2019-01-21 | End: 2019-09-11

## 2019-03-06 DIAGNOSIS — E78.2 MIXED HYPERLIPIDEMIA: ICD-10-CM

## 2019-03-06 DIAGNOSIS — Z12.5 SCREENING FOR PROSTATE CANCER: ICD-10-CM

## 2019-03-06 DIAGNOSIS — Z00.00 HEALTH CARE MAINTENANCE: Primary | ICD-10-CM

## 2019-03-08 LAB
ALBUMIN SERPL-MCNC: 4.7 G/DL (ref 3.5–5.2)
ALBUMIN/GLOB SERPL: 2.1 G/DL
ALP SERPL-CCNC: 66 U/L (ref 39–117)
ALT SERPL-CCNC: 20 U/L (ref 1–41)
APPEARANCE UR: CLEAR
AST SERPL-CCNC: 20 U/L (ref 1–40)
BACTERIA #/AREA URNS HPF: NORMAL /HPF
BASOPHILS # BLD AUTO: 0.01 10*3/MM3 (ref 0–0.2)
BASOPHILS NFR BLD AUTO: 0.2 % (ref 0–1.5)
BILIRUB SERPL-MCNC: 0.7 MG/DL (ref 0.1–1.2)
BILIRUB UR QL STRIP: NEGATIVE
BUN SERPL-MCNC: 14 MG/DL (ref 8–23)
BUN/CREAT SERPL: 13.9 (ref 7–25)
CALCIUM SERPL-MCNC: 9.5 MG/DL (ref 8.6–10.5)
CHLORIDE SERPL-SCNC: 104 MMOL/L (ref 98–107)
CHOLEST SERPL-MCNC: 153 MG/DL (ref 0–200)
CO2 SERPL-SCNC: 23.8 MMOL/L (ref 22–29)
COLOR UR: YELLOW
CREAT SERPL-MCNC: 1.01 MG/DL (ref 0.76–1.27)
EOSINOPHIL # BLD AUTO: 0.13 10*3/MM3 (ref 0–0.4)
EOSINOPHIL NFR BLD AUTO: 2.1 % (ref 0.3–6.2)
EPI CELLS #/AREA URNS HPF: NORMAL /HPF
ERYTHROCYTE [DISTWIDTH] IN BLOOD BY AUTOMATED COUNT: 12.5 % (ref 12.3–15.4)
GLOBULIN SER CALC-MCNC: 2.2 GM/DL
GLUCOSE SERPL-MCNC: 97 MG/DL (ref 65–99)
GLUCOSE UR QL: NEGATIVE
HBA1C MFR BLD: 5.6 % (ref 4.8–5.6)
HCT VFR BLD AUTO: 48.1 % (ref 37.5–51)
HDLC SERPL-MCNC: 56 MG/DL (ref 40–60)
HGB BLD-MCNC: 15.5 G/DL (ref 13–17.7)
HGB UR QL STRIP: NEGATIVE
IMM GRANULOCYTES # BLD AUTO: 0.02 10*3/MM3 (ref 0–0.05)
IMM GRANULOCYTES NFR BLD AUTO: 0.3 % (ref 0–0.5)
KETONES UR QL STRIP: NEGATIVE
LDLC SERPL CALC-MCNC: 82 MG/DL (ref 0–100)
LEUKOCYTE ESTERASE UR QL STRIP: NEGATIVE
LYMPHOCYTES # BLD AUTO: 1.69 10*3/MM3 (ref 0.7–3.1)
LYMPHOCYTES NFR BLD AUTO: 27.8 % (ref 19.6–45.3)
MCH RBC QN AUTO: 28.4 PG (ref 26.6–33)
MCHC RBC AUTO-ENTMCNC: 32.2 G/DL (ref 31.5–35.7)
MCV RBC AUTO: 88.3 FL (ref 79–97)
MICRO URNS: NORMAL
MICRO URNS: NORMAL
MONOCYTES # BLD AUTO: 0.58 10*3/MM3 (ref 0.1–0.9)
MONOCYTES NFR BLD AUTO: 9.6 % (ref 5–12)
MUCOUS THREADS URNS QL MICRO: PRESENT /HPF
NEUTROPHILS # BLD AUTO: 3.64 10*3/MM3 (ref 1.4–7)
NEUTROPHILS NFR BLD AUTO: 60 % (ref 42.7–76)
NITRITE UR QL STRIP: NEGATIVE
NRBC BLD AUTO-RTO: 0 /100 WBC (ref 0–0)
PH UR STRIP: 6.5 [PH] (ref 5–7.5)
PLATELET # BLD AUTO: 186 10*3/MM3 (ref 140–450)
POTASSIUM SERPL-SCNC: 4.2 MMOL/L (ref 3.5–5.2)
PROT SERPL-MCNC: 6.9 G/DL (ref 6–8.5)
PROT UR QL STRIP: NEGATIVE
PSA SERPL-MCNC: 0.64 NG/ML (ref 0–4)
RBC # BLD AUTO: 5.45 10*6/MM3 (ref 4.14–5.8)
RBC #/AREA URNS HPF: NORMAL /HPF
SODIUM SERPL-SCNC: 142 MMOL/L (ref 136–145)
SP GR UR: 1.02 (ref 1–1.03)
TRIGL SERPL-MCNC: 73 MG/DL (ref 0–150)
TSH SERPL DL<=0.005 MIU/L-ACNC: 3.5 MIU/ML (ref 0.27–4.2)
URINALYSIS REFLEX: NORMAL
UROBILINOGEN UR STRIP-MCNC: 0.2 MG/DL (ref 0.2–1)
VLDLC SERPL CALC-MCNC: 14.6 MG/DL (ref 5–40)
WBC # BLD AUTO: 6.07 10*3/MM3 (ref 3.4–10.8)
WBC #/AREA URNS HPF: NORMAL /HPF

## 2019-03-12 ENCOUNTER — OFFICE VISIT (OUTPATIENT)
Dept: INTERNAL MEDICINE | Facility: CLINIC | Age: 64
End: 2019-03-12

## 2019-03-12 VITALS
HEIGHT: 74 IN | RESPIRATION RATE: 18 BRPM | SYSTOLIC BLOOD PRESSURE: 110 MMHG | HEART RATE: 70 BPM | BODY MASS INDEX: 25.41 KG/M2 | OXYGEN SATURATION: 99 % | WEIGHT: 198 LBS | DIASTOLIC BLOOD PRESSURE: 60 MMHG

## 2019-03-12 DIAGNOSIS — Z00.00 HEALTH MAINTENANCE EXAMINATION: Primary | ICD-10-CM

## 2019-03-12 DIAGNOSIS — E78.2 MIXED HYPERLIPIDEMIA: ICD-10-CM

## 2019-03-12 PROCEDURE — 90715 TDAP VACCINE 7 YRS/> IM: CPT | Performed by: INTERNAL MEDICINE

## 2019-03-12 PROCEDURE — 99396 PREV VISIT EST AGE 40-64: CPT | Performed by: INTERNAL MEDICINE

## 2019-03-12 PROCEDURE — 90471 IMMUNIZATION ADMIN: CPT | Performed by: INTERNAL MEDICINE

## 2019-03-12 NOTE — PROGRESS NOTES
Chief Complaint  Uriel Garcia is a 63 y.o. male who presents for Annual Exam (CPE)  .    History of Present Illness   Uriel is here for routine cpe.  He retired this year and has been really working on his weight.  He had shoulder surgery on the right and has had elbow pain.  He is still having some issues with his elbow on the right side.  He has seen someone at [facility].  He has had an MRI on it and a cortisone shot.  He's back to playing tennis.  The elbow locks up at times but not while playing tennis.  He does take two advil prior to playing tennis.      C-scope: 7/11/2014, repeat in 5 years. Has one scheduled this year. Dr. Skaggs    Exercise: Pt states that he gets about 5 times weekly, 1.5 hours each day.     Immunization History   Administered Date(s) Administered   • Flu Mist 10/01/2017   • Flu Vaccine Quad PF >18YRS 10/20/2016   • Influenza TIV (IM) 11/17/2015   • Td 08/03/2015       Review of Systems   Constitution: Negative for chills, fever and malaise/fatigue.   HENT: Negative for hearing loss, hoarse voice and sore throat.    Eyes: Negative for blurred vision, double vision and visual disturbance.   Cardiovascular: Negative for chest pain, leg swelling and palpitations.   Respiratory: Negative for cough and shortness of breath.    Endocrine: Negative for polydipsia and polyuria.   Hematologic/Lymphatic: Does not bruise/bleed easily.   Skin: Negative for rash and suspicious lesions.   Musculoskeletal: Positive for arthritis and joint pain. Negative for myalgias.   Gastrointestinal: Negative for bloating, abdominal pain, change in bowel habit, constipation, diarrhea, dysphagia, hematochezia, melena, nausea and vomiting.   Genitourinary: Negative for dysuria and hematuria.   Neurological: Negative for dizziness, headaches and light-headedness.   Psychiatric/Behavioral: Negative for depression. The patient is nervous/anxious.        Patient Active Problem List   Diagnosis   • Anxiety   • Hyperlipidemia    • Right elbow pain   • Right knee pain   • Chronic pain of left heel   • Decreased range of motion of right shoulder   • Decreased range of motion of right elbow   • Erectile dysfunction   • Obstructive sleep apnea, adult   • Chronic allergic rhinitis       Past Medical History:   Diagnosis Date   • Anxiety    • Arthritis    • Benign colonic polyp    • BPPV (benign paroxysmal positional vertigo) 10/20/2016   • Hyperlipidemia     was on higher dose of Lipitor in past but had elevated LFTs. Added Zetia and now in good control   • Limited joint range of motion     RT SHOULDER   • Sleep apnea     uses CPAP       Past Surgical History:   Procedure Laterality Date   • COLONOSCOPY  2014   • HERNIA REPAIR Right 2007   • JOINT MANIPULATION Right 9/14/2017    Procedure: RIGHT SHOULDER MANIPULATION;  Surgeon: Jann Barger MD;  Location: [facility] OR [facility];  Service:    • SHOULDER ARTHROSCOPY Right 9/14/2017    Procedure: ARTHROSCOPY LYSIS OF ADHESIONS AND BURSECTOMY RIGHT SHOULDER;  Surgeon: Jann Barger MD;  Location: [facility] OR [facility];  Service:        Family History   Problem Relation Age of Onset   • Hypertension Father    • Heart attack Maternal Grandmother    • Heart disease Maternal Grandmother         ASCVD   • Depression Other         siblings   • Diabetes Other    • Diabetes Sister    • Alcohol abuse Brother    • Diabetes Brother    • Hodgkin's lymphoma Maternal Grandfather    • Malig Hyperthermia Neg Hx        Social History     Socioeconomic History   • Marital status:      Spouse name: Mamta   • Number of children: 2   • Years of education: Not on file   • Highest education level: Not on file   Social Needs   • Financial resource strain: Not on file   • Food insecurity - worry: Not on file   • Food insecurity - inability: Not on file   • Transportation needs - medical: Not on file   • Transportation needs - non-medical: Not on file   Occupational History   • Occupation:  - retired   Tobacco  "Use   • Smoking status: Never Smoker   • Smokeless tobacco: Never Used   Substance and Sexual Activity   • Alcohol use: Yes     Comment: social about 8-10 drinks a week   • Drug use: Yes     Types: Marijuana     Comment: occasionally   • Sexual activity: Defer   Other Topics Concern   • Not on file   Social History Narrative   • Not on file       Current Outpatient Medications on File Prior to Visit   Medication Sig Dispense Refill   • atorvastatin (LIPITOR) 10 MG tablet TAKE 1 TABLET DAILY 90 tablet 1   • sildenafil (VIAGRA) 50 MG tablet TAKE 1 TABLET DAILY AS NEEDED FOR ERECTILE DYSFUNCTION 24 tablet 3     No current facility-administered medications on file prior to visit.        No Known Allergies    Vitals:    03/12/19 0825   BP: 110/60   Pulse: 70   Resp: 18   SpO2: 99%   Weight: 89.8 kg (198 lb)   Height: 188 cm (74.02\")       Body mass index is 25.41 kg/m².    Objective   Physical Exam   Constitutional: He is oriented to person, place, and time. He appears well-developed and well-nourished. No distress.   HENT:   Head: Normocephalic and atraumatic.   Right Ear: Hearing and external ear normal.   Left Ear: Hearing and external ear normal.   Nose: Nose normal.   Mouth/Throat: Oropharynx is clear and moist and mucous membranes are normal.   Eyes: Conjunctivae, EOM and lids are normal. Pupils are equal, round, and reactive to light. No scleral icterus.   Neck: Trachea normal and normal range of motion. Neck supple.   Cardiovascular: Normal rate, regular rhythm and normal heart sounds. Exam reveals no gallop and no friction rub.   No murmur heard.  Pulses:       Carotid pulses are 2+ on the right side, and 2+ on the left side.       Femoral pulses are 2+ on the right side, and 2+ on the left side.       Dorsalis pedis pulses are 2+ on the right side, and 2+ on the left side.        Posterior tibial pulses are 2+ on the right side, and 2+ on the left side.   Pulmonary/Chest: Effort normal and breath sounds normal. " No respiratory distress. He has no wheezes. He has no rales.   Abdominal: Soft. Normal appearance and bowel sounds are normal. He exhibits no distension and no mass. There is no tenderness.   Musculoskeletal: Normal range of motion. He exhibits no edema.     Vascular Status -  His right foot exhibits no edema. His left foot exhibits no edema.  Skin Integrity  -  His right foot skin is intact.His left foot skin is intact..  Lymphadenopathy:     He has no cervical adenopathy.        Right: No inguinal and no supraclavicular adenopathy present.        Left: No inguinal and no supraclavicular adenopathy present.   Neurological: He is alert and oriented to person, place, and time. He has normal strength. No cranial nerve deficit. He exhibits normal muscle tone. Coordination and gait normal.   Skin: Skin is warm and dry. No rash noted.   Psychiatric: He has a normal mood and affect. His speech is normal and behavior is normal. Judgment and thought content normal. Cognition and memory are normal.   Vitals reviewed.        Results for orders placed or performed in visit on 03/06/19   Comprehensive Metabolic Panel   Result Value Ref Range    Glucose 97 65 - 99 mg/dL    BUN 14 8 - 23 mg/dL    Creatinine 1.01 0.76 - 1.27 mg/dL    eGFR Non African Am 75 >60 mL/min/1.73    eGFR African Am 91 >60 mL/min/1.73    BUN/Creatinine Ratio 13.9 7.0 - 25.0    Sodium 142 136 - 145 mmol/L    Potassium 4.2 3.5 - 5.2 mmol/L    Chloride 104 98 - 107 mmol/L    Total CO2 23.8 22.0 - 29.0 mmol/L    Calcium 9.5 8.6 - 10.5 mg/dL    Total Protein 6.9 6.0 - 8.5 g/dL    Albumin 4.70 3.50 - 5.20 g/dL    Globulin 2.2 gm/dL    A/G Ratio 2.1 g/dL    Total Bilirubin 0.7 0.1 - 1.2 mg/dL    Alkaline Phosphatase 66 39 - 117 U/L    AST (SGOT) 20 1 - 40 U/L    ALT (SGPT) 20 1 - 41 U/L   Lipid Panel   Result Value Ref Range    Total Cholesterol 153 0 - 200 mg/dL    Triglycerides 73 0 - 150 mg/dL    HDL Cholesterol 56 40 - 60 mg/dL    VLDL Cholesterol 14.6 5 - 40  mg/dL    LDL Cholesterol  82 0 - 100 mg/dL   TSH Rfx On Abnormal To Free T4   Result Value Ref Range    TSH 3.50 0.27 - 4.2 mIU/mL   Hemoglobin A1c   Result Value Ref Range    Hemoglobin A1C 5.60 4.80 - 5.60 %   Urinalysis With Culture If Indicated - Urine, Clean Catch   Result Value Ref Range    Specific Gravity, UA 1.022 1.005 - 1.030    pH, UA 6.5 5.0 - 7.5    Color, UA Yellow Yellow    Appearance, UA Clear Clear    Leukocytes, UA Negative Negative    Protein Negative Negative/Trace    Glucose, UA Negative Negative    Ketones Negative Negative    Blood, UA Negative Negative    Bilirubin, UA Negative Negative    Urobilinogen, UA 0.2 0.2 - 1.0 mg/dL    Nitrite, UA Negative Negative    Microscopic Examination Comment     Microscopic Examination See below:     Urinalysis Reflex Comment    PSA DIAGNOSTIC   Result Value Ref Range    PSA 0.635 0.000 - 4.000 ng/mL   Microscopic Examination -   Result Value Ref Range    WBC, UA 0-5 0 - 5 /hpf    RBC, UA None seen 0 - 2 /hpf    Epithelial Cells (non renal) None seen 0 - 10 /hpf    Mucus, UA Present Not Estab. /hpf    Bacteria, UA None seen None seen/Few /hpf   CBC & Differential   Result Value Ref Range    WBC 6.07 3.40 - 10.80 10*3/mm3    RBC 5.45 4.14 - 5.80 10*6/mm3    Hemoglobin 15.5 13.0 - 17.7 g/dL    Hematocrit 48.1 37.5 - 51.0 %    MCV 88.3 79.0 - 97.0 fL    MCH 28.4 26.6 - 33.0 pg    MCHC 32.2 31.5 - 35.7 g/dL    RDW 12.5 12.3 - 15.4 %    Platelets 186 140 - 450 10*3/mm3    Neutrophil Rel % 60.0 42.7 - 76.0 %    Lymphocyte Rel % 27.8 19.6 - 45.3 %    Monocyte Rel % 9.6 5.0 - 12.0 %    Eosinophil Rel % 2.1 0.3 - 6.2 %    Basophil Rel % 0.2 0.0 - 1.5 %    Neutrophils Absolute 3.64 1.40 - 7.00 10*3/mm3    Lymphocytes Absolute 1.69 0.70 - 3.10 10*3/mm3    Monocytes Absolute 0.58 0.10 - 0.90 10*3/mm3    Eosinophils Absolute 0.13 0.00 - 0.40 10*3/mm3    Basophils Absolute 0.01 0.00 - 0.20 10*3/mm3    Immature Granulocyte Rel % 0.3 0.0 - 0.5 %    Immature Grans Absolute  0.02 0.00 - 0.05 10*3/mm3    nRBC 0.0 0.0 - 0.0 /100 WBC       Assessment/Plan   Diagnoses and all orders for this visit:    Health maintenance examination  -     Tdap Vaccine Greater Than or Equal To 8yo IM    Mixed hyperlipidemia        Discussion/Summary  Uriel is here for routine cpe.  He is a very healthy 62 y/o.  Tdap today.  Continue regular exercise.  He will look into the shingles vaccine at his local pharmacy. His lipids look great.  Continue current meds.        No Follow-up on file.

## 2019-04-18 ENCOUNTER — PREP FOR SURGERY (OUTPATIENT)
Dept: OTHER | Facility: HOSPITAL | Age: 64
End: 2019-04-18

## 2019-04-18 DIAGNOSIS — K63.5 COLON POLYP: Primary | ICD-10-CM

## 2019-05-02 PROBLEM — K63.5 COLON POLYP: Status: ACTIVE | Noted: 2019-05-02

## 2019-06-24 ENCOUNTER — TELEPHONE (OUTPATIENT)
Dept: GASTROENTEROLOGY | Facility: CLINIC | Age: 64
End: 2019-06-24

## 2019-06-24 NOTE — TELEPHONE ENCOUNTER
I called the patient but was unable to get an answer.  Because I cannot talk to him before this colonoscopy then I would recommend that we cancel the colonoscopy until he talks to the surgeon about his hernia.  I would want the surgeon to say that you do not have to worry about this hernia during a colonoscopy if that is what the surgeon thinks.  The surgeon may say that the colonoscopy would only be safe after hernia surgery? [email or web address]

## 2019-06-24 NOTE — TELEPHONE ENCOUNTER
----- Message from Hernandez Main sent at 6/24/2019  9:38 AM EDT -----  Regarding: pt called   Contact: [phone]  Pt is calling stating he has a hernia & he has a scope this week. Pt is asking would that affect anything    No

## 2019-06-24 NOTE — TELEPHONE ENCOUNTER
Call to pt.  Reports has inguinal hernia - plans to f/u with surgeon. Wants DR Skaggs to aware of this for upcoming c/s on 6/28.

## 2019-06-25 NOTE — TELEPHONE ENCOUNTER
Call to pt.  Advise per Dr Skaggs that would recommend cancel the c/s until talks to the surgeon about hernia.  Dr Skaggs would want the surgeon to say that do not have to worry about this hernia during a c/s.  Surgeon may say that the c/s would only be safe after hernia surgery.    Verb understanding.  States will contact this office after surgeon appt to reschedule c/s.    Message to Scheduling to cancel c/s at this time.(place in [place]).

## 2019-07-11 ENCOUNTER — OFFICE VISIT (OUTPATIENT)
Dept: SURGERY | Facility: CLINIC | Age: 64
End: 2019-07-11

## 2019-07-11 VITALS — WEIGHT: 196.6 LBS | HEART RATE: 71 BPM | BODY MASS INDEX: 25.23 KG/M2 | HEIGHT: 74 IN | OXYGEN SATURATION: 98 %

## 2019-07-11 DIAGNOSIS — R10.31 RIGHT INGUINAL PAIN: Primary | ICD-10-CM

## 2019-07-11 PROCEDURE — 99243 OFF/OP CNSLTJ NEW/EST LOW 30: CPT | Performed by: SURGERY

## 2019-07-11 RX ORDER — MELOXICAM 15 MG/1
15 TABLET ORAL DAILY
Qty: 14 TABLET | Refills: 0 | Status: SHIPPED | OUTPATIENT
Start: 2019-07-11 | End: 2019-09-11

## 2019-07-11 NOTE — PROGRESS NOTES
SUMMARY (A/P):    63-year-old gentleman with right inguinal pain over the last 6 months which actually has been improving in the last few weeks.  There is no evidence of recurrent hernia on exam today.  Discussed options with him today and prescribed Mobic 15 mg p.o. daily for 14 days to see if this will help further his improvement.  He is to return if he has any worsening or recurrence of his symptoms.      CC:    Right inguinal pain, referred for consultation by Dr. Gomez    HPI:    63-year-old gentleman with 6-month history of mild to moderate intermittent right inguinal pain that is worse with lifting and activity.  Symptoms have improved in the last 1 to 2 weeks.  There is no associated bulge that he can feel or see.    PSH:    Laparoscopic right inguinal hernia repair 2007  Colonoscopy 2014    PMH:    Sleep apnea  Hyperlipidemia    FAMILY HISTORY:    Negative for colorectal cancer    SOCIAL HISTORY:   Denies tobacco use  Occasional alcohol use    ALLERGIES: reviewed, in Epic    MEDICATIONS: reviewed, in Epic    ROS:  No chest pain or shortness of air.  All other systems reviewed and negative other than presenting complaints.    PHYSICAL EXAM:   Constitutional: Well-developed well-nourished, no acute distress  Vital signs: HR 71, weight 196 pounds, height 74 inches, BMI 25.2  Eyes: Conjunctiva normal, sclera nonicteric  ENMT: Hearing grossly normal, oral mucosa moist  Neck: Supple, no palpable mass, trachea midline  Respiratory: Clear to auscultation, normal inspiratory effort  Cardiovascular: Regular rate, no jugular venous distention  Gastrointestinal: Soft, nontender  Genitourinary: Testicles are descended and normal.  No visible or palpable evidence of hernia on either side.  Lymphatics (palpable nodes):  cervical-negative, inguinal-negative  Skin:  Warm, dry, no rash on visualized skin surfaces  Musculoskeletal: Symmetric strength, normal gait  Psychiatric: Alert and oriented ×3, normal affect      ROYCE ALCARAZ M.D.

## 2019-08-15 ENCOUNTER — TELEPHONE (OUTPATIENT)
Dept: INTERNAL MEDICINE | Facility: CLINIC | Age: 64
End: 2019-08-15

## 2019-08-15 DIAGNOSIS — N52.9 ERECTILE DYSFUNCTION, UNSPECIFIED ERECTILE DYSFUNCTION TYPE: ICD-10-CM

## 2019-08-15 RX ORDER — SILDENAFIL 50 MG/1
50 TABLET, FILM COATED ORAL DAILY PRN
Qty: 24 TABLET | Refills: 3 | Status: SHIPPED | OUTPATIENT
Start: 2019-08-15 | End: 2020-10-19

## 2019-08-24 ENCOUNTER — PREP FOR SURGERY (OUTPATIENT)
Dept: OTHER | Facility: HOSPITAL | Age: 64
End: 2019-08-24

## 2019-08-24 DIAGNOSIS — K63.5 COLON POLYP: Primary | ICD-10-CM

## 2019-08-30 DIAGNOSIS — R73.01 IFG (IMPAIRED FASTING GLUCOSE): Primary | ICD-10-CM

## 2019-09-04 LAB
ALBUMIN SERPL-MCNC: 4.8 G/DL (ref 3.5–5.2)
ALBUMIN/GLOB SERPL: 2.2 G/DL
ALP SERPL-CCNC: 71 U/L (ref 39–117)
ALT SERPL-CCNC: 21 U/L (ref 1–41)
AST SERPL-CCNC: 18 U/L (ref 1–40)
BILIRUB SERPL-MCNC: 0.9 MG/DL (ref 0.2–1.2)
BUN SERPL-MCNC: 16 MG/DL (ref 8–23)
BUN/CREAT SERPL: 15.5 (ref 7–25)
CALCIUM SERPL-MCNC: 9.5 MG/DL (ref 8.6–10.5)
CHLORIDE SERPL-SCNC: 103 MMOL/L (ref 98–107)
CO2 SERPL-SCNC: 26.2 MMOL/L (ref 22–29)
CREAT SERPL-MCNC: 1.03 MG/DL (ref 0.76–1.27)
GLOBULIN SER CALC-MCNC: 2.2 GM/DL
GLUCOSE SERPL-MCNC: 97 MG/DL (ref 65–99)
HBA1C MFR BLD: 5.4 % (ref 4.8–5.6)
POTASSIUM SERPL-SCNC: 4.5 MMOL/L (ref 3.5–5.2)
PROT SERPL-MCNC: 7 G/DL (ref 6–8.5)
SODIUM SERPL-SCNC: 142 MMOL/L (ref 136–145)

## 2019-09-09 ENCOUNTER — TELEPHONE (OUTPATIENT)
Dept: GASTROENTEROLOGY | Facility: CLINIC | Age: 64
End: 2019-09-09

## 2019-09-09 ENCOUNTER — PREP FOR SURGERY (OUTPATIENT)
Dept: OTHER | Facility: HOSPITAL | Age: 64
End: 2019-09-09

## 2019-09-09 NOTE — TELEPHONE ENCOUNTER
----- Message from Hoa Catherine RN sent at 9/9/2019  9:14 AM EDT -----  Regarding: Returning call regarding Hernia  Patient returning Dr Skaggs's call regarding hernia.  #  [phone]

## 2019-09-09 NOTE — TELEPHONE ENCOUNTER
----- Message from Hoa Catherine RN sent at 9/9/2019  9:14 AM EDT -----  Regarding: Returning call regarding Hernia  Patient returning Dr Skaggs's call regarding hernia.  #  [phone]

## 2019-09-09 NOTE — TELEPHONE ENCOUNTER
Called pt and pt reports that he saw Dr King and what he had was not a hernia but was some torn scar tissue and he is ok to have c/s.  Advised will update Dr Skaggs and message given to Hayde in scheduling to call pt.

## 2019-09-11 ENCOUNTER — OFFICE VISIT (OUTPATIENT)
Dept: INTERNAL MEDICINE | Facility: CLINIC | Age: 64
End: 2019-09-11

## 2019-09-11 VITALS
OXYGEN SATURATION: 99 % | BODY MASS INDEX: 25.41 KG/M2 | WEIGHT: 198 LBS | HEIGHT: 74 IN | HEART RATE: 62 BPM | TEMPERATURE: 97.8 F | SYSTOLIC BLOOD PRESSURE: 114 MMHG | DIASTOLIC BLOOD PRESSURE: 80 MMHG

## 2019-09-11 DIAGNOSIS — M25.561 ACUTE PAIN OF RIGHT KNEE: ICD-10-CM

## 2019-09-11 DIAGNOSIS — N52.9 ERECTILE DYSFUNCTION, UNSPECIFIED ERECTILE DYSFUNCTION TYPE: ICD-10-CM

## 2019-09-11 DIAGNOSIS — Z23 IMMUNIZATION DUE: ICD-10-CM

## 2019-09-11 DIAGNOSIS — E78.2 MIXED HYPERLIPIDEMIA: Primary | ICD-10-CM

## 2019-09-11 DIAGNOSIS — M25.521 RIGHT ELBOW PAIN: ICD-10-CM

## 2019-09-11 DIAGNOSIS — G47.33 OBSTRUCTIVE SLEEP APNEA, ADULT: ICD-10-CM

## 2019-09-11 DIAGNOSIS — Z00.00 HEALTHCARE MAINTENANCE: ICD-10-CM

## 2019-09-11 PROCEDURE — 90471 IMMUNIZATION ADMIN: CPT | Performed by: INTERNAL MEDICINE

## 2019-09-11 PROCEDURE — 90674 CCIIV4 VAC NO PRSV 0.5 ML IM: CPT | Performed by: INTERNAL MEDICINE

## 2019-09-11 PROCEDURE — 99214 OFFICE O/P EST MOD 30 MIN: CPT | Performed by: INTERNAL MEDICINE

## 2019-09-11 RX ORDER — IBUPROFEN 200 MG
400 TABLET ORAL DAILY PRN
COMMUNITY
End: 2020-03-20

## 2019-09-11 NOTE — PROGRESS NOTES
Follow-up (6 mon f/u ) and Hyperlipidemia      HPI  Uriel Garcia is a 63 y.o. male RTC in f/u: Transfer from Dr. Rodriguez. Overall has been healthy.   1. HLD - has been on statin for years now.  Had regular labs for this in past.  Recalls Dr. Melgar put pt on med in distant past.  Tried off med in past but cholesterol went over 300.  Lost some weight over last year after retired. No longer sitting at desk. NO plays tennis 2-4x/ week.  Walking a lot now. Has lost about 25# of weight.   Has stopped eating red meat in last 2-2.5 months.  Wife cooks well and both get fruits >> vegetables on regular basis.  Few green vegetables.   2. JUAN - is on CPAP nightly. Sees sleep med once yearly, next in 8 months.  Pressures were OK last visit, had lost most of weight by that visit. No change in pressures.   3. Fracture in R leg, bone bruise L heel, elbow locking, and R shoulder surgery - has all happened in last 2 years.  Had cortisone shot in R elbow and allows pt to keep playing tennis. Took Meloxicam for a short period of time. No longer takes.  No daily meds for pain, but uses 2 Advil prior to playing tennis, primarily for elbow.    4. ED - uses Viagra for 2 years. Works well. No side effects noted.   5. HM - desires to get shingles vaccine, but asks about; wants flu shot today;       Review of Systems   Constitution: Negative for chills, fever and malaise/fatigue.   Cardiovascular: Negative for chest pain and dyspnea on exertion.   Respiratory: Negative for shortness of breath, sleep disturbances due to breathing and snoring.    Musculoskeletal: Positive for joint pain (R elbow and R shoulder, variable). Negative for muscle weakness and myalgias.       The following portions of the patient's history were reviewed and updated as appropriate: allergies, current medications, past medical history, past social history and problem list.      Current Outpatient Medications:   •  ibuprofen (ADVIL,MOTRIN) 200 MG tablet, Take 400 mg  "by mouth Daily As Needed., Disp: , Rfl:   •  sildenafil (VIAGRA) 50 MG tablet, Take 1 tablet by mouth Daily As Needed for erectile dysfunction., Disp: 24 tablet, Rfl: 3    Vitals:    09/11/19 0809   BP: 114/80   Pulse: 62   Temp: 97.8 °F (36.6 °C)   SpO2: 99%   Weight: 89.8 kg (198 lb)   Height: 188 cm (74.02\")         Physical Exam   Constitutional: He is oriented to person, place, and time. He appears well-developed and well-nourished. No distress.   HENT:   Head: Normocephalic and atraumatic.   Mouth/Throat: Oropharynx is clear and moist. No oropharyngeal exudate.   Class II Mallampati     Eyes: Pupils are equal, round, and reactive to light.   Neck: Normal range of motion. Neck supple. Carotid bruit is not present.   Cardiovascular: Normal rate, regular rhythm and normal heart sounds.   Pulses:       Carotid pulses are 2+ on the right side, and 2+ on the left side.       Radial pulses are 2+ on the right side, and 2+ on the left side.   Pulmonary/Chest: Effort normal and breath sounds normal. No respiratory distress. He has no wheezes. He has no rales.   Musculoskeletal: He exhibits no edema.   Neurological: He is alert and oriented to person, place, and time. No cranial nerve deficit.   Psychiatric: He has a normal mood and affect. His behavior is normal.   Vitals reviewed.      Assessment/ Plan  Diagnoses and all orders for this visit:    Mixed hyperlipidemia    Obstructive sleep apnea, adult    Right elbow pain    Acute pain of right knee    Erectile dysfunction, unspecified erectile dysfunction type    Healthcare maintenance  -     Flucelvax Quad=>4Years (PFS)    Other orders  -     ibuprofen (ADVIL,MOTRIN) 200 MG tablet; Take 400 mg by mouth Daily As Needed.        Return in about 6 months (around 3/11/2020).      Discussion:  Uriel Garcia is a 63 y.o. male RTC in f/u: Transfer from Dr. Rodriguez.   1. HLD - long hx, on statin since with Dr. Melgar.  However, in last year has lost 25# after care home and " is eating better now with no red meat.  I think we can stop statin and see how he does / assess CV risk at f/u visit. Counseled pt, he is agreeable to plan. C/W TLC.   2. JUAN - on CPAP nightly, compliant.  Sees sleep med yearly, last note 1/2019 with no changes.  PT aware of importance of regular use CPAP and is compliant.   3. Fracture in R leg, bone bruise L heel, elbow locking, and R shoulder surgery; all in last 2 years - is overall doing well, managing with activity, cortisone shot in elbow PRN and NSAID dose OTC prior to tennis games. I had long discussion with pt regarding risk vs. Benefit of NSAIDs and pt voices understanding. Will avoid daily NSAID use at this time.  Will continue to monitor.   4. ED -  Viagra for 2 years PRN.  No issues.   5. HM -Flu shot today; Shingrix check at pharmacy, counseled; check records on Hep A vaccine, counseled.     RTC 6 months CPE, F labs prior

## 2019-11-07 ENCOUNTER — ANESTHESIA (OUTPATIENT)
Dept: GASTROENTEROLOGY | Facility: HOSPITAL | Age: 64
End: 2019-11-07

## 2019-11-07 ENCOUNTER — ANESTHESIA EVENT (OUTPATIENT)
Dept: GASTROENTEROLOGY | Facility: HOSPITAL | Age: 64
End: 2019-11-07

## 2019-11-07 ENCOUNTER — HOSPITAL ENCOUNTER (OUTPATIENT)
Facility: HOSPITAL | Age: 64
Setting detail: HOSPITAL OUTPATIENT SURGERY
Discharge: HOME | End: 2019-11-07
Attending: INTERNAL MEDICINE | Admitting: INTERNAL MEDICINE

## 2019-11-07 VITALS
TEMPERATURE: 97.7 F | DIASTOLIC BLOOD PRESSURE: 84 MMHG | BODY MASS INDEX: 25.2 KG/M2 | SYSTOLIC BLOOD PRESSURE: 120 MMHG | OXYGEN SATURATION: 96 % | HEART RATE: 65 BPM | WEIGHT: 196.38 LBS | HEIGHT: 74 IN | RESPIRATION RATE: 16 BRPM

## 2019-11-07 DIAGNOSIS — K63.5 COLON POLYP: ICD-10-CM

## 2019-11-07 PROCEDURE — 25010000002 PROPOFOL 10 MG/ML EMULSION: Performed by: NURSE ANESTHETIST, CERTIFIED REGISTERED

## 2019-11-07 PROCEDURE — 45380 COLONOSCOPY AND BIOPSY: CPT | Performed by: INTERNAL MEDICINE

## 2019-11-07 PROCEDURE — 88305 TISSUE EXAM BY PATHOLOGIST: CPT | Performed by: INTERNAL MEDICINE

## 2019-11-07 PROCEDURE — S0260 H&P FOR SURGERY: HCPCS | Performed by: INTERNAL MEDICINE

## 2019-11-07 RX ORDER — SODIUM CHLORIDE, SODIUM LACTATE, POTASSIUM CHLORIDE, CALCIUM CHLORIDE 600; 310; 30; 20 MG/100ML; MG/100ML; MG/100ML; MG/100ML
1000 INJECTION, SOLUTION INTRAVENOUS CONTINUOUS
Status: DISCONTINUED | OUTPATIENT
Start: 2019-11-07 | End: 2019-11-07 | Stop reason: HOSPADM

## 2019-11-07 RX ORDER — LIDOCAINE HYDROCHLORIDE 20 MG/ML
INJECTION, SOLUTION INFILTRATION; PERINEURAL AS NEEDED
Status: DISCONTINUED | OUTPATIENT
Start: 2019-11-07 | End: 2019-11-07 | Stop reason: SURG

## 2019-11-07 RX ORDER — PROPOFOL 10 MG/ML
VIAL (ML) INTRAVENOUS AS NEEDED
Status: DISCONTINUED | OUTPATIENT
Start: 2019-11-07 | End: 2019-11-07 | Stop reason: SURG

## 2019-11-07 RX ORDER — PROPOFOL 10 MG/ML
VIAL (ML) INTRAVENOUS CONTINUOUS PRN
Status: DISCONTINUED | OUTPATIENT
Start: 2019-11-07 | End: 2019-11-07 | Stop reason: SURG

## 2019-11-07 RX ADMIN — LIDOCAINE HYDROCHLORIDE 100 MG: 20 INJECTION, SOLUTION INFILTRATION; PERINEURAL at 14:07

## 2019-11-07 RX ADMIN — PROPOFOL 300 MCG/KG/MIN: 10 INJECTION, EMULSION INTRAVENOUS at 14:07

## 2019-11-07 RX ADMIN — SODIUM CHLORIDE, POTASSIUM CHLORIDE, SODIUM LACTATE AND CALCIUM CHLORIDE 1000 ML: 600; 310; 30; 20 INJECTION, SOLUTION INTRAVENOUS at 13:12

## 2019-11-07 RX ADMIN — PROPOFOL 100 MG: 10 INJECTION, EMULSION INTRAVENOUS at 14:07

## 2019-11-07 NOTE — ANESTHESIA POSTPROCEDURE EVALUATION
"Patient: Uriel Garcia    Procedure Summary     Date:  11/07/19 Room / Location:   [facility]    Anesthesia Start:  1406 Anesthesia Stop:  1434    Procedure:  COLONOSCOPY  to cecum and TI  : cold biopsy polypectomies, (N/A ) Diagnosis:       Colon polyp      (Colon polyp [K63.5])    Surgeon:  Sage Skaggs MD Provider:  Gregor Brambila MD    Anesthesia Type:  MAC ASA Status:  2          Anesthesia Type: MAC  Last vitals  BP   108/72 (11/07/19 1443)   Temp   36.5 °C (97.7 °F) (11/07/19 1257)   Pulse   55 (11/07/19 1443)   Resp   16 (11/07/19 1443)     SpO2   96 % (11/07/19 1443)     Post Anesthesia Care and Evaluation    Patient location during evaluation: bedside  Patient participation: complete - patient participated  Level of consciousness: awake and alert  Pain management: adequate  Airway patency: patent  Anesthetic complications: No anesthetic complications    Cardiovascular status: acceptable  Respiratory status: acceptable  Hydration status: acceptable    Comments: /72   Pulse 55   Temp 36.5 °C (97.7 °F) (Oral)   Resp 16   Ht 188 cm (74\")   Wt 89.1 kg (196 lb 6 oz)   SpO2 96%   BMI 25.21 kg/m²       "

## 2019-11-07 NOTE — H&P
"[facility]  Pre Procedure History & Physical    Chief Complaint:   64 yo male who has a h/o colon polyps. His last colonoscopy was about 5 yrs ago.     Subjective     HPI:   63 y.o. male who has a h/o colon polyps. His last colonoscopy was about 5 yrs ago.         Past Medical History:   Past Medical History:   Diagnosis Date   • Anxiety    • Arthritis    • Benign colonic polyp    • BPPV (benign paroxysmal positional vertigo) 10/20/2016   • Hyperlipidemia     was on higher dose of Lipitor in past but had elevated LFTs. Added Zetia and now in good control   • Limited joint range of motion     RT SHOULDER   • Sleep apnea     uses CPAP       Family History:  Family History   Problem Relation Age of Onset   • Hypertension Father    • Heart attack Maternal Grandmother    • Heart disease Maternal Grandmother         ASCVD   • Depression Other         siblings   • Diabetes Other    • Diabetes Sister    • Alcohol abuse Brother    • Diabetes Brother    • Hodgkin's lymphoma Maternal Grandfather    • Malig Hyperthermia Neg Hx        Social History:   reports that he has never smoked. He has never used smokeless tobacco. He reports that he drinks alcohol. He reports that he uses drugs. Drug: Marijuana.    Medications:   Medications Prior to Admission   Medication Sig Dispense Refill Last Dose   • sildenafil (VIAGRA) 50 MG tablet Take 1 tablet by mouth Daily As Needed for erectile dysfunction. 24 tablet 3 Past Week at Unknown time   • ibuprofen (ADVIL,MOTRIN) 200 MG tablet Take 400 mg by mouth Daily As Needed.   11/1/2019       Allergies:  Patient has no known allergies.    ROS:    Pertinent items are noted in HPI     Objective     Blood pressure 133/84, pulse 65, temperature 97.7 °F (36.5 °C), temperature source Oral, resp. rate 17, height 188 cm (74\"), weight 89.1 kg (196 lb 6 oz), SpO2 98 %.    Physical Exam   Constitutional: Pt is oriented to person, place, and time and well-developed, " well-nourished, and in no distress.   HENT:   Mouth/Throat: Oropharynx is clear and moist.   Neck: Normal range of motion. Neck supple.   Cardiovascular: Normal rate, regular rhythm and normal heart sounds.    Pulmonary/Chest: Effort normal and breath sounds normal. No respiratory distress. No  wheezes.   Abdominal: Soft. Bowel sounds are normal.   Skin: Skin is warm and dry.   Psychiatric: Mood, memory, affect and judgment normal.     Assessment/Plan     Diagnosis:  63 y.o. male who has a h/o colon polyps. His last colonoscopy was about 5 yrs ago.     Anticipated Surgical Procedure:  Colonoscopy    The risks, benefits, and alternatives of this procedure have been discussed with the patient or the responsible party- the patient understands and agrees to proceed.

## 2019-11-07 NOTE — ANESTHESIA PREPROCEDURE EVALUATION
Anesthesia Evaluation     NPO Solid Status: > 8 hours  NPO Liquid Status: > 4 hours           Airway   Mallampati: II  TM distance: >3 FB  Neck ROM: full  no difficulty expected  Dental - normal exam     Pulmonary - normal exam   (+) sleep apnea,   Cardiovascular - normal exam    (+) hyperlipidemia,       Neuro/Psych  (+) psychiatric history Anxiety,     GI/Hepatic/Renal/Endo      Musculoskeletal     Abdominal  - normal exam   Substance History      OB/GYN          Other   arthritis,                      Anesthesia Plan    ASA 2     MAC       Anesthetic plan, all risks, benefits, and alternatives have been provided, discussed and informed consent has been obtained with: patient.

## 2019-11-08 LAB
CYTO UR: NORMAL
LAB AP CASE REPORT: NORMAL
PATH REPORT.FINAL DX SPEC: NORMAL
PATH REPORT.GROSS SPEC: NORMAL

## 2019-11-08 NOTE — PROGRESS NOTES
11/08/19  Tell him that the colon polyps that were removed were not cancerous but some of them were precancerous.  I would recommend that he have a repeat colonoscopy in 3 years.  Please fax a copy of this path report to his PCP.  Rian lynch

## 2019-11-11 ENCOUNTER — TELEPHONE (OUTPATIENT)
Dept: GASTROENTEROLOGY | Facility: CLINIC | Age: 64
End: 2019-11-11

## 2019-11-11 NOTE — TELEPHONE ENCOUNTER
----- Message from Sage Skaggs MD sent at 11/8/2019  5:18 PM EST -----  11/08/19  Tell him that the colon polyps that were removed were not cancerous but some of them were precancerous.  I would recommend that he have a repeat colonoscopy in 3 years.  Please fax a copy of this path report to his PCP.  Thx. kjh

## 2019-11-11 NOTE — TELEPHONE ENCOUNTER
Called pt, mailbox full, unable to leave message.     C/s placed in recall for 11/07/2022.  Path results sent to Dr Armstrong thru [employer].

## 2019-11-11 NOTE — TELEPHONE ENCOUNTER
Call to pt.  Advise per DR Skaggs that colon polyps that were removed were not cancerous, but some of them were precancerous.  Would recommend repeat c/s in 3 yrs.  Verb understanding.     Path results faxed via [employer] to DR Gregor Gomez.

## 2020-02-27 DIAGNOSIS — Z12.5 SCREENING FOR PROSTATE CANCER: ICD-10-CM

## 2020-02-27 DIAGNOSIS — E78.2 MIXED HYPERLIPIDEMIA: ICD-10-CM

## 2020-02-27 DIAGNOSIS — Z00.00 HEALTHCARE MAINTENANCE: Primary | ICD-10-CM

## 2020-02-27 DIAGNOSIS — N52.9 ERECTILE DYSFUNCTION, UNSPECIFIED ERECTILE DYSFUNCTION TYPE: ICD-10-CM

## 2020-03-12 LAB
ALBUMIN SERPL-MCNC: 4.5 G/DL (ref 3.5–5.2)
ALBUMIN/GLOB SERPL: 2 G/DL
ALP SERPL-CCNC: 70 U/L (ref 39–117)
ALT SERPL-CCNC: 23 U/L (ref 1–41)
APPEARANCE UR: CLEAR
AST SERPL-CCNC: 19 U/L (ref 1–40)
BACTERIA #/AREA URNS HPF: NORMAL /HPF
BASOPHILS # BLD AUTO: 0.02 10*3/MM3 (ref 0–0.2)
BASOPHILS NFR BLD AUTO: 0.4 % (ref 0–1.5)
BILIRUB SERPL-MCNC: 0.8 MG/DL (ref 0.2–1.2)
BILIRUB UR QL STRIP: NEGATIVE
BUN SERPL-MCNC: 13 MG/DL (ref 8–23)
BUN/CREAT SERPL: 13.7 (ref 7–25)
CALCIUM SERPL-MCNC: 9.1 MG/DL (ref 8.6–10.5)
CHLORIDE SERPL-SCNC: 103 MMOL/L (ref 98–107)
CHOLEST SERPL-MCNC: 277 MG/DL (ref 0–200)
CO2 SERPL-SCNC: 26.2 MMOL/L (ref 22–29)
COLOR UR: YELLOW
CREAT SERPL-MCNC: 0.95 MG/DL (ref 0.76–1.27)
EOSINOPHIL # BLD AUTO: 0.1 10*3/MM3 (ref 0–0.4)
EOSINOPHIL NFR BLD AUTO: 1.8 % (ref 0.3–6.2)
EPI CELLS #/AREA URNS HPF: NORMAL /HPF (ref 0–10)
ERYTHROCYTE [DISTWIDTH] IN BLOOD BY AUTOMATED COUNT: 12.6 % (ref 12.3–15.4)
GLOBULIN SER CALC-MCNC: 2.2 GM/DL
GLUCOSE SERPL-MCNC: 95 MG/DL (ref 65–99)
GLUCOSE UR QL: NEGATIVE
HCT VFR BLD AUTO: 44.3 % (ref 37.5–51)
HDLC SERPL-MCNC: 60 MG/DL (ref 40–60)
HGB BLD-MCNC: 14.7 G/DL (ref 13–17.7)
HGB UR QL STRIP: NEGATIVE
IMM GRANULOCYTES # BLD AUTO: 0.04 10*3/MM3 (ref 0–0.05)
IMM GRANULOCYTES NFR BLD AUTO: 0.7 % (ref 0–0.5)
KETONES UR QL STRIP: NEGATIVE
LDLC SERPL CALC-MCNC: 194 MG/DL (ref 0–100)
LEUKOCYTE ESTERASE UR QL STRIP: NEGATIVE
LYMPHOCYTES # BLD AUTO: 1.6 10*3/MM3 (ref 0.7–3.1)
LYMPHOCYTES NFR BLD AUTO: 28.4 % (ref 19.6–45.3)
MCH RBC QN AUTO: 28.4 PG (ref 26.6–33)
MCHC RBC AUTO-ENTMCNC: 33.2 G/DL (ref 31.5–35.7)
MCV RBC AUTO: 85.5 FL (ref 79–97)
MICRO URNS: NORMAL
MICRO URNS: NORMAL
MONOCYTES # BLD AUTO: 0.5 10*3/MM3 (ref 0.1–0.9)
MONOCYTES NFR BLD AUTO: 8.9 % (ref 5–12)
MUCOUS THREADS URNS QL MICRO: PRESENT /HPF
NEUTROPHILS # BLD AUTO: 3.38 10*3/MM3 (ref 1.7–7)
NEUTROPHILS NFR BLD AUTO: 59.8 % (ref 42.7–76)
NITRITE UR QL STRIP: NEGATIVE
NRBC BLD AUTO-RTO: 0 /100 WBC (ref 0–0.2)
PH UR STRIP: 7.5 [PH] (ref 5–7.5)
PLATELET # BLD AUTO: 199 10*3/MM3 (ref 140–450)
POTASSIUM SERPL-SCNC: 4.5 MMOL/L (ref 3.5–5.2)
PROT SERPL-MCNC: 6.7 G/DL (ref 6–8.5)
PROT UR QL STRIP: NEGATIVE
PSA SERPL-MCNC: 0.59 NG/ML (ref 0–4)
RBC # BLD AUTO: 5.18 10*6/MM3 (ref 4.14–5.8)
RBC #/AREA URNS HPF: NORMAL /HPF (ref 0–2)
SODIUM SERPL-SCNC: 140 MMOL/L (ref 136–145)
SP GR UR: 1.02 (ref 1–1.03)
TRIGL SERPL-MCNC: 116 MG/DL (ref 0–150)
TSH SERPL DL<=0.005 MIU/L-ACNC: 3.26 UIU/ML (ref 0.27–4.2)
URINALYSIS REFLEX: NORMAL
UROBILINOGEN UR STRIP-MCNC: 0.2 MG/DL (ref 0.2–1)
VLDLC SERPL CALC-MCNC: 23.2 MG/DL
WBC # BLD AUTO: 5.64 10*3/MM3 (ref 3.4–10.8)
WBC #/AREA URNS HPF: NORMAL /HPF (ref 0–5)

## 2020-03-18 ENCOUNTER — OFFICE VISIT (OUTPATIENT)
Dept: SLEEP MEDICINE | Facility: HOSPITAL | Age: 65
End: 2020-03-18

## 2020-03-18 VITALS
HEART RATE: 66 BPM | HEIGHT: 74 IN | SYSTOLIC BLOOD PRESSURE: 135 MMHG | DIASTOLIC BLOOD PRESSURE: 76 MMHG | WEIGHT: 200 LBS | BODY MASS INDEX: 25.67 KG/M2

## 2020-03-18 DIAGNOSIS — G47.33 OBSTRUCTIVE SLEEP APNEA: Primary | ICD-10-CM

## 2020-03-18 PROCEDURE — G0463 HOSPITAL OUTPT CLINIC VISIT: HCPCS

## 2020-03-18 NOTE — PROGRESS NOTES
"[facility]  Telephone: [phone] / Fax: [phone] [place]  Telephone: [phone] / Fax: [phone] Judy [place]berry    PCP: Gregor Gomez MD    Reason for visit: JUAN f/u    Uriel Garcia is a 64 y.o.male  was last seen at [facility] sleep lab in January 2019    Since last visit patient has done well on auto CPAP.  Sleep quality has improved in comparison to prior treatment, and excessive sleepiness/snoring is no longer an issue.  There is no complaint of aerophagia, excessive dry mouth or air leak.  His sleep schedule is consistent. He goes to bed at 10pm and is up at 8am. ESS is 2.    SH- no tobacco, drinks 10 alcohol per week, 7 caffeine per day,     ROS- negative    DME [facility]    Current Medications:    Current Outpatient Medications:   •  ibuprofen (ADVIL,MOTRIN) 200 MG tablet, Take 400 mg by mouth Daily As Needed., Disp: , Rfl:   •  sildenafil (VIAGRA) 50 MG tablet, Take 1 tablet by mouth Daily As Needed for erectile dysfunction., Disp: 24 tablet, Rfl: 3   also entered in Sleep Questionnaire    Patient  has a past medical history of Anxiety, Arthritis, Benign colonic polyp, BPPV (benign paroxysmal positional vertigo) (10/20/2016), Hyperlipidemia, Limited joint range of motion, and Sleep apnea.    I have reviewed the Past Medical History, Past Surgical History, Social History and Family History.    Vital Signs /76   Pulse 66   Ht 188 cm (74\")   Wt 90.7 kg (200 lb)   BMI 25.68 kg/m²  Body mass index is 25.68 kg/m².    General Alert and oriented. No acute distress noted   Pharynx/Throat Class IV Mallampati airway, large tongue, no evidence of redundant lateral pharyngeal tissue. No oral lesions. No thrush. Moist mucous membranes.   Head Normocephalic. Symmetrical. Atraumatic.    Nose No septal deviation. No drainage   Chest Wall Normal shape. Symmetric expansion with respiration. No tenderness.   Neck Trachea midline, no thyromegaly or adenopathy    Lungs Clear to " auscultation bilaterally. No wheezes. No rhonchi. No rales. Respirations regular, even and unlabored.   Heart Regular rhythm and normal rate. Normal S1 and S2. No murmur   Abdomen Soft, non-tender and non-distended. Normal bowel sounds. No masses.   Extremities Moves all extremities well. No edema   Psychiatric Normal mood and affect.     Testing:  · Download 12/19/19-3/17/20- 100% compliance with average nightly use of 9 hours and 36 minutes on auto CPAP 5-15cm, AHI 2.0.    Study-  · 3/6/14  FINDINGS: Diagnostic study from 9:42 p.m. to 5:55 a.m. Sleep efficiency was  somewhat poor at 77%, but there was adequate sleep time of 6.33 hours for  diagnostic purposes. Sleep distribution showed 7.8% stage I sleep, 69.2% stage  II sleep, 12.8% slow-wave sleep, 10.3% REM sleep. Apnea-hypopnea index is 14.7  events an hour indicating moderately severe obstructive sleep apnea which is  almost severe by respiratory disturbance index criteria at 28 events an hour.  In the supine position, very, very severe obstructive sleep apnea is present  with AHI of 76.9. In REM sleep, AHI was 10.8 with RDI of 17. Oxygen saturation  fell below 90% for 6% of total sleep time indicating significant sleep-related  hypoxemia. Arousal index high at 41 and patient had 68.12% time snoring.      Impression:  1. Obstructive sleep apnea      Patient uses the CPAP device and benefits from its use in terms of reduction of hypersomnia and snoring.  AHI appears to be within adequate range. I reviewed download report and original sleep study report with the patient.     Weight loss will be strongly beneficial to reduce the severity of sleep-disordered breathing.  Caution during activities that require prolonged concentration is strongly advised if sleepiness returns. Changing of PAP supplies regularly is important for effective use.  I will request DME to provide new nasal pillows q2 weeks and new tubing every 3 months, disposable filters 2 per month,  water chamber every 6 months.      Follow up with Dr. Arambula in one year    Thank you for allowing me to participate in your patient's care.      JONH Heath  [facility]  Phone: [phone]      Part of this note may be an electronic transcription/translation of spoken language to printed text using the Dragon Dictation System.

## 2020-03-20 ENCOUNTER — OFFICE VISIT (OUTPATIENT)
Dept: INTERNAL MEDICINE | Facility: CLINIC | Age: 65
End: 2020-03-20

## 2020-03-20 VITALS
OXYGEN SATURATION: 97 % | DIASTOLIC BLOOD PRESSURE: 80 MMHG | WEIGHT: 200 LBS | SYSTOLIC BLOOD PRESSURE: 120 MMHG | HEART RATE: 72 BPM | RESPIRATION RATE: 12 BRPM | HEIGHT: 74 IN | BODY MASS INDEX: 25.67 KG/M2 | TEMPERATURE: 97.9 F

## 2020-03-20 DIAGNOSIS — K63.5 POLYP OF COLON, UNSPECIFIED PART OF COLON, UNSPECIFIED TYPE: ICD-10-CM

## 2020-03-20 DIAGNOSIS — G47.33 OBSTRUCTIVE SLEEP APNEA, ADULT: ICD-10-CM

## 2020-03-20 DIAGNOSIS — E78.2 MIXED HYPERLIPIDEMIA: ICD-10-CM

## 2020-03-20 DIAGNOSIS — Z00.00 HEALTHCARE MAINTENANCE: Primary | ICD-10-CM

## 2020-03-20 DIAGNOSIS — N52.9 ERECTILE DYSFUNCTION, UNSPECIFIED ERECTILE DYSFUNCTION TYPE: ICD-10-CM

## 2020-03-20 PROBLEM — G89.29 CHRONIC PAIN OF LEFT HEEL: Status: RESOLVED | Noted: 2017-02-16 | Resolved: 2020-03-20

## 2020-03-20 PROBLEM — M25.561 RIGHT KNEE PAIN: Status: RESOLVED | Noted: 2017-02-16 | Resolved: 2020-03-20

## 2020-03-20 PROBLEM — M79.672 CHRONIC PAIN OF LEFT HEEL: Status: RESOLVED | Noted: 2017-02-16 | Resolved: 2020-03-20

## 2020-03-20 PROBLEM — J30.9 CHRONIC ALLERGIC RHINITIS: Status: RESOLVED | Noted: 2017-11-09 | Resolved: 2020-03-20

## 2020-03-20 PROBLEM — M25.621 DECREASED RANGE OF MOTION OF RIGHT ELBOW: Status: RESOLVED | Noted: 2017-08-02 | Resolved: 2020-03-20

## 2020-03-20 PROBLEM — M25.611 DECREASED RANGE OF MOTION OF RIGHT SHOULDER: Status: RESOLVED | Noted: 2017-08-02 | Resolved: 2020-03-20

## 2020-03-20 PROCEDURE — 99396 PREV VISIT EST AGE 40-64: CPT | Performed by: INTERNAL MEDICINE

## 2020-03-20 RX ORDER — IBUPROFEN 200 MG
TABLET ORAL
Start: 2020-03-20

## 2020-03-20 RX ORDER — ATORVASTATIN CALCIUM 10 MG/1
10 TABLET, FILM COATED ORAL DAILY
Qty: 90 TABLET | Refills: 3 | Status: SHIPPED | OUTPATIENT
Start: 2020-03-20 | End: 2021-03-15

## 2020-03-20 NOTE — PROGRESS NOTES
"Annual Exam (review of medical issues )      HPI  Uriel Garcia is a 64 y.o. male RTC In yearly CPE, review of medical issues:  Has been doing well. Has been healthy.   1. HLD - off statin. No change in how felt off med.  Diet has changed in that did drink more ETOH and ate more fried food for 2 weeks prior to labs as was in [place] for vacation. Prior to that was doing OK. Saw labs prior to today and notes \"I got a bad result\".  Is doing \"a lot of exercise'. Is exercising 8-10 hours/ week.  Single and doubles tennis mostly.  Hiking. Elliptical in basement.   2. JUAN - on CPAP nightly, no isues. 'It works great'. Had sleep med appt 2 days ago and no changes.   3. R groin swelling - told was scar tisse and was not a hernia. Had repair at site in past. Really no pain. Can be uncomfortable.   4. Colon polyps - had 6 on 12/2019 C-scope and told to come back in 3 years.  Pt is aware. Saw Dr. Skaggs for scope.   5. ED - \"No, I take medicine for it\". Feels like is  Better since gotten in better shape over time. Uses Viagra PRN.    6. Fracture in R leg, bone bruise L heel, R elbow locking, and R shoulder surgery; all in last 2 years; eval'd at [facility] and worked with PT and bursal removal and frozen shoulder manipulation on R with ortho/ cortisone shot in R elbow with pain better, locking persisted - feels like R arm is really the only major health complaint he has.  In last 6 months has developed a tingling and numbness from R shoulder all the way down to thumb. No weakness.  Numbness is intermittent only and can be positional with sleep with lying certain way, sitting in chair for a long time.  Will resolve with position change.  No neck pain. [facility] suggested to pt that tingling in arm was from neck.  Overall is not limited and so deferred ortho re-eval due to that.       Review of Systems   Constitution: Negative for chills, fever, malaise/fatigue, weight gain and weight loss.   HENT: Negative for congestion, hearing " loss, odynophagia and sore throat.    Eyes: Negative for discharge, double vision, pain and redness.        Last eye exam ~2018; wears glasses     Cardiovascular: Negative for chest pain, dyspnea on exertion, irregular heartbeat, near-syncope, palpitations and syncope.   Respiratory: Negative for cough, shortness of breath, sleep disturbances due to breathing and snoring.    Endocrine: Negative for polydipsia, polyphagia and polyuria.   Hematologic/Lymphatic: Negative for bleeding problem. Does not bruise/bleed easily.   Skin: Negative for rash and suspicious lesions.   Musculoskeletal: Negative for back pain, joint pain, joint swelling, muscle cramps, muscle weakness, myalgias, neck pain and stiffness.   Gastrointestinal: Negative for constipation, diarrhea, dysphagia, heartburn, nausea and vomiting.   Genitourinary: Negative for dysuria, frequency, hematuria, hesitancy and incomplete emptying.   Neurological: Positive for numbness (on R arm). Negative for excessive daytime sleepiness, dizziness, headaches and light-headedness.   Psychiatric/Behavioral: Negative for depression. The patient does not have insomnia and is not nervous/anxious.    Allergic/Immunologic: Negative for environmental allergies and persistent infections.       Problem List:    Patient Active Problem List   Diagnosis   • Anxiety   • Hyperlipidemia   • Erectile dysfunction   • Obstructive sleep apnea, adult   • Colon polyp       Medical History:    Past Medical History:   Diagnosis Date   • Anxiety    • Arthritis    • Benign colonic polyp    • Bone bruise 2018    Left heel   • BPPV (benign paroxysmal positional vertigo) 10/20/2016   • Frozen shoulder syndrome 2018    RT SHOULDER; s/p manipulation   • Hyperlipidemia     was on higher dose of Lipitor in past but had elevated LFTs. Added Zetia and now in good control   • Sleep apnea     uses CPAP        Social History:    Social History     Socioeconomic History   • Marital status:       Spouse name: Mamta   • Number of children: 2   • Years of education: Not on file   • Highest education level: Not on file   Occupational History   • Occupation:  - retired   Tobacco Use   • Smoking status: Never Smoker   • Smokeless tobacco: Never Used   Substance and Sexual Activity   • Alcohol use: Yes     Comment: social about 8-10 drinks a week; has to 'limit myself'   • Drug use: Yes     Types: Marijuana     Comment: very seldom, as edible   • Sexual activity: Yes     Partners: Female     Comment: wife only; no hx STD's   Lifestyle   • Physical activity:     Days per week: 5 days     Minutes per session: 60 min   • Stress: Not on file   Social History Narrative    Diet - overall healthy; 7/10 on scale; no red meat; eats fruits and vegetables < daily    Exercise - tennis, hiking, elliptical    Caffeine - 1 diet Dr. Pepper daily       Family History:   Family History   Problem Relation Age of Onset   • Hypertension Father    • Nephrolithiasis Father    • Heart attack Maternal Grandmother    • Heart disease Maternal Grandmother         ASCVD   • Depression Other         siblings   • Diabetes Other    • Diabetes Sister         Type I   • Alcohol abuse Brother    • Diabetes Brother         Type II   • Hodgkin's lymphoma Maternal Grandfather    • Dementia Mother         Lewy Body   • Parkinsonism Mother    • No Known Problems Son    • No Known Problems Son    • Malig Hyperthermia Neg Hx        Surgical History:   Past Surgical History:   Procedure Laterality Date   • COLONOSCOPY  2014   • COLONOSCOPY N/A 11/7/2019    Procedure: COLONOSCOPY  to cecum and TI  : cold biopsy polypectomies,;  Surgeon: Sage Skaggs MD;  Location: [facility] ENDOSCOPY;  Service: Gastroenterology   • HERNIA REPAIR Right 2007    Inguinal   • JOINT MANIPULATION Right 9/14/2017    Procedure: RIGHT SHOULDER MANIPULATION;  Surgeon: Jann Barger MD;  Location: [facility] OR [facility] [place];  Service:    • SHOULDER ARTHROSCOPY Right 9/14/2017     Procedure: ARTHROSCOPY LYSIS OF ADHESIONS AND BURSECTOMY RIGHT SHOULDER;  Surgeon: Jann Barger MD;  Location: [facility] OR [facility] [place];  Service:          Current Outpatient Medications:   •  atorvastatin (LIPITOR) 10 MG tablet, Take 1 tablet by mouth Daily., Disp: 90 tablet, Rfl: 3  •  ibuprofen (ADVIL,MOTRIN) 200 MG tablet, Takes 2 tabs PO prior to tennis game, Disp: , Rfl:   •  sildenafil (VIAGRA) 50 MG tablet, Take 1 tablet by mouth Daily As Needed for erectile dysfunction., Disp: 24 tablet, Rfl: 3    Vitals:    03/20/20 1337   BP: 120/80   Pulse: 72   Resp: 12   Temp: 97.9 °F (36.6 °C)   SpO2: 97%     Body mass index is 25.68 kg/m².    Physical Exam   Constitutional: He is oriented to person, place, and time. He appears well-developed and well-nourished. He is cooperative. No distress.   HENT:   Head: Normocephalic and atraumatic.   Right Ear: Hearing, tympanic membrane, external ear and ear canal normal.   Left Ear: Hearing, tympanic membrane, external ear and ear canal normal.   Nose: Nose normal.   Mouth/Throat: Uvula is midline, oropharynx is clear and moist and mucous membranes are normal. No oropharyngeal exudate.   Eyes: Pupils are equal, round, and reactive to light. Conjunctivae, EOM and lids are normal. Right eye exhibits no discharge. Left eye exhibits no discharge. No scleral icterus.   Neck: Normal range of motion and full passive range of motion without pain. Neck supple. Carotid bruit is not present. No thyroid mass and no thyromegaly present.   Cardiovascular: Normal rate, regular rhythm, S1 normal, S2 normal and normal heart sounds. Exam reveals no gallop and no friction rub.   No murmur heard.  Pulses:       Radial pulses are 2+ on the right side, and 2+ on the left side.        Dorsalis pedis pulses are 2+ on the right side, and 2+ on the left side.        Posterior tibial pulses are 2+ on the right side, and 2+ on the left side.   Pulmonary/Chest: Effort normal and breath sounds normal. No  respiratory distress. He has no wheezes. He has no rhonchi. He has no rales.   Abdominal: Soft. Bowel sounds are normal. He exhibits no distension and no mass. There is no hepatosplenomegaly. There is no tenderness. There is no rebound and no guarding.   Genitourinary: Rectum normal and prostate normal. Prostate is not enlarged and not tender.   Musculoskeletal: Normal range of motion. He exhibits no edema.        Right shoulder: He exhibits normal range of motion, no tenderness, no bony tenderness, no crepitus, no pain (-) Jobes and Gerbers, normal pulse and normal strength.     Vascular Status -  His right foot exhibits normal foot vasculature  and no edema. His left foot exhibits normal foot vasculature  and no edema.  Skin Integrity  -  His right foot skin is intact.His left foot skin is intact..  Lymphadenopathy:     He has no cervical adenopathy.     He has no axillary adenopathy.        Right: No inguinal adenopathy present.        Left: No inguinal adenopathy present.   Neurological: He is alert and oriented to person, place, and time. He has normal strength and normal reflexes. He displays no tremor. No cranial nerve deficit or sensory deficit. He exhibits normal muscle tone. Gait normal.   Reflex Scores:       Tricep reflexes are 2+ on the right side and 2+ on the left side.       Brachioradialis reflexes are 2+ on the right side and 2+ on the left side.       Patellar reflexes are 2+ on the right side and 2+ on the left side.       Achilles reflexes are 2+ on the right side and 2+ on the left side.  Skin: Skin is warm, dry and intact. No rash noted.   Psychiatric: He has a normal mood and affect. His speech is normal and behavior is normal. Thought content normal. Cognition and memory are normal.   Vitals reviewed.      Assessment/ Plan  Diagnoses and all orders for this visit:    Healthcare maintenance    Mixed hyperlipidemia  -     atorvastatin (LIPITOR) 10 MG tablet; Take 1 tablet by mouth  Daily.    Obstructive sleep apnea, adult    Polyp of colon, unspecified part of colon, unspecified type    Erectile dysfunction, unspecified erectile dysfunction type    Other orders  -     ibuprofen (ADVIL,MOTRIN) 200 MG tablet; Takes 2 tabs PO prior to tennis game        Return in about 1 year (around 3/20/2021) for Annual physical.      Discussion:  Uriel Garcia is a 64 y.o. male RTC In yearly CPE, review of medical issues:    1. HLD - LDL baseline 194 on trial off statin, despite recent past 25# weight loss after skilled nursing with diet and exercise mods.  Will restart atorvastatin 10mg daily.  Trend lipids next labs.   2. JUAN - on CPAP nightly, compliant. No change on sleep med note 3/2020.  3. Colon polyps - x  6 on 12/2019 C-scope (1TA and 1 SSA) 11/2019 with Dr. Skaggs --> repeat 3 years.   4. ED - Viagra PRN.    5. Hx of fracture in R leg, bone bruise L heel, R elbow locking, and R shoulder surgery; all in last 2 years; jw'chauncey at [facility] and worked with PT and bursal removal and frozen shoulder manipulation on R with ortho/ cortisone shot in R elbow with pain better, locking persisted - exam is benign and FROM on R shoulder; does have some tingling with reflex triggering at R tricep.  Is suspect this issue is all from C-spine by exam and hx. Pt has no limitations from this and declines any additional work-up now. Will RTC is sx progress and desires additional work up.   6. HM -labs d/w pt; Flu/ Tdap/ Hep A - UTD;  Shingrix check at pharmacy, counseled; C-scope 11/2019 (6 polyps) --> 3 years; JENNIFER/ PSA OK today; Hep C  Ab (-) 2/2017; c/w exercise mods.     RTC one year CPE, F labs prior

## 2020-10-18 DIAGNOSIS — N52.9 ERECTILE DYSFUNCTION, UNSPECIFIED ERECTILE DYSFUNCTION TYPE: ICD-10-CM

## 2020-10-19 RX ORDER — SILDENAFIL 50 MG/1
TABLET, FILM COATED ORAL
Qty: 24 TABLET | Refills: 5 | Status: SHIPPED | OUTPATIENT
Start: 2020-10-19 | End: 2021-10-29

## 2021-02-11 ENCOUNTER — IMMUNIZATION (OUTPATIENT)
Dept: VACCINE CLINIC | Facility: HOSPITAL | Age: 66
End: 2021-02-11

## 2021-02-11 PROCEDURE — 91300 HC SARSCOV02 VAC 30MCG/0.3ML IM: CPT | Performed by: INTERNAL MEDICINE

## 2021-02-11 PROCEDURE — 0001A: CPT | Performed by: INTERNAL MEDICINE

## 2021-03-04 ENCOUNTER — IMMUNIZATION (OUTPATIENT)
Dept: VACCINE CLINIC | Facility: HOSPITAL | Age: 66
End: 2021-03-04

## 2021-03-04 PROCEDURE — 0002A: CPT | Performed by: INTERNAL MEDICINE

## 2021-03-04 PROCEDURE — 91300 HC SARSCOV02 VAC 30MCG/0.3ML IM: CPT | Performed by: INTERNAL MEDICINE

## 2021-03-05 ENCOUNTER — OFFICE VISIT (OUTPATIENT)
Dept: SLEEP MEDICINE | Facility: HOSPITAL | Age: 66
End: 2021-03-05

## 2021-03-05 VITALS
SYSTOLIC BLOOD PRESSURE: 116 MMHG | HEART RATE: 74 BPM | HEIGHT: 74 IN | BODY MASS INDEX: 25.8 KG/M2 | WEIGHT: 201 LBS | OXYGEN SATURATION: 99 % | DIASTOLIC BLOOD PRESSURE: 73 MMHG

## 2021-03-05 DIAGNOSIS — G47.33 OBSTRUCTIVE SLEEP APNEA: Primary | ICD-10-CM

## 2021-03-05 PROCEDURE — G0463 HOSPITAL OUTPT CLINIC VISIT: HCPCS

## 2021-03-05 NOTE — PROGRESS NOTES
"[facility]  [address] [facility]  [address] [facility] [address]  [phone]    PCP: Gregor Gomez MD    Reason for visit:  Sleep disorders: JUAN    Uriel is a 65 y.o.male who was seen in the [facility] today. Annual fu. He uses nasal pillows, fits well, no air leaks or dry mouth. He changes only every 6 months. Sleeps from 10pm to 8am. Wakes up rested and refreshed. No changes in overall health.  [place] Sleepiness Scale is 2. Caffeine 1 per day. Alcohol 7 per week.    Uriel  reports that he has never smoked. He has never used smokeless tobacco.    Pertinent Positive Review of Systems of anxiety, depression  Rest of Review of Systems was negative as recorded in Sleep Questionnaire.    Patient  has a past medical history of Anxiety, Arthritis, Benign colonic polyp, Bone bruise (2018), BPPV (benign paroxysmal positional vertigo) (10/20/2016), Frozen shoulder syndrome (2018), Hyperlipidemia, and Sleep apnea.     Current Medications:    Current Outpatient Medications:   •  atorvastatin (LIPITOR) 10 MG tablet, Take 1 tablet by mouth Daily., Disp: 90 tablet, Rfl: 3  •  ibuprofen (ADVIL,MOTRIN) 200 MG tablet, Takes 2 tabs PO prior to tennis game, Disp: , Rfl:   •  sildenafil (VIAGRA) 50 MG tablet, TAKE 1 TABLET DAILY AS NEEDED FOR ERECTILE DYSFUNCTION, Disp: 24 tablet, Rfl: 5   also entered in Sleep Questionnaire         Vital Signs: /73   Pulse 74   Ht 188 cm (74\")   Wt 91.2 kg (201 lb)   SpO2 99%   BMI 25.81 kg/m²     Body mass index is 25.81 kg/m².       Tongue: Normal       Dentition: good       Pharynx: Posterior pharyngeal pillars are narrow   Mallampatti: IV (only hard palate visible)        General: Alert. Cooperative. Well developed. No acute distress.             Head:  Normocephalic. Symmetrical. Atraumatic.              Nose: No septal deviation. No drainage.          Throat: No oral lesions. No thrush. Moist mucous membranes.    Chest Wall:  Normal shape. " Symmetric expansion with respiration. No tenderness.             Neck:  Trachea midline.           Lungs:  Clear to auscultation bilaterally. No wheezes. No rhonchi. No rales. Respirations regular, even and unlabored.            Heart:  Regular rhythm and normal rate. Normal S1 and S2. No murmur.     Abdomen:  Soft, non-tender and non-distended. Normal bowel sounds. No masses.  Extremities:  Moves all extremities well. No edema.    Psychiatric: Normal mood and affect.    Diagnostic data available to date is as below and was reviewed on current visit:  · 3/6/14  FINDINGS: Diagnostic study from 9:42 p.m. to 5:55 a.m. Sleep efficiency was  somewhat poor at 77%, but there was adequate sleep time of 6.33 hours for  diagnostic purposes. Sleep distribution showed 7.8% stage I sleep, 69.2% stage  II sleep, 12.8% slow-wave sleep, 10.3% REM sleep. Apnea-hypopnea index is 14.7  events an hour indicating moderately severe obstructive sleep apnea which is  almost severe by respiratory disturbance index criteria at 28 events an hour.  In the supine position, very, very severe obstructive sleep apnea is present  with AHI of 76.9. In REM sleep, AHI was 10.8 with RDI of 17. Oxygen saturation  fell below 90% for 6% of total sleep time indicating significant sleep-related  hypoxemia. Arousal index high at 41 and patient had 68.12% time snoring.     Most current available usage data reviewed on 03/05/2021:  · 100% compliance average 9 hours 38 minutes AHI 2.3 average pressure 6 to 7 cm    DME Company: [employer]    Impression:  1. Obstructive sleep apnea        Plan:  Uriel is compliant and wakes up rested and refreshed.  He changes his nasal pillows only once a year.  Advised to do so more frequently.  Otherwise no complaints and his machine is less than 5 years old.    I reiterated the importance of effective treatment of obstructive sleep apnea with PAP machine.  Cardiovascular health risks of untreated sleep apnea were again reviewed.   Patient was asked to remain cautious if there is persistent hypersomnolence. The benefit of weight loss in reducing severity of obstructive sleep apnea was discussed.  Patient would benefit from adhering to a strict diet to achieve ideal BMI.     Change of PAP supplies regularly is important for effective use.  Change of cushion on the mask or plugs on nasal pillows along with disposable filters once every month and change of mask frame, tubing, headgear and Velcro straps every 6 months at the minimum was reiterated.    This patient is compliant with PAP machine and benefits from its use.  Apnea hypopneas index is corrected/improved.  Daytime hypersomnolence has resolved.     Patient will follow up in this clinic in 1 year APRN    Thank you for allowing me to participate in your patient's care.    Electronically signed by Chuy Arambula MD, 03/05/21, 10:44 AM EST.    Part of this note may be an electronic transcription/translation of spoken language to printed text using the Dragon Dictation System.

## 2021-03-15 DIAGNOSIS — E78.2 MIXED HYPERLIPIDEMIA: ICD-10-CM

## 2021-03-15 RX ORDER — ATORVASTATIN CALCIUM 10 MG/1
TABLET, FILM COATED ORAL
Qty: 90 TABLET | Refills: 3 | Status: SHIPPED | OUTPATIENT
Start: 2021-03-15 | End: 2022-01-19 | Stop reason: SDUPTHER

## 2021-03-30 DIAGNOSIS — N52.9 ERECTILE DYSFUNCTION, UNSPECIFIED ERECTILE DYSFUNCTION TYPE: ICD-10-CM

## 2021-03-30 DIAGNOSIS — E78.2 MIXED HYPERLIPIDEMIA: ICD-10-CM

## 2021-03-30 DIAGNOSIS — Z00.00 HEALTHCARE MAINTENANCE: Primary | ICD-10-CM

## 2021-03-30 DIAGNOSIS — Z12.5 SCREENING FOR PROSTATE CANCER: ICD-10-CM

## 2021-04-10 LAB
ALBUMIN SERPL-MCNC: 4.9 G/DL (ref 3.5–5.2)
ALBUMIN/GLOB SERPL: 2.3 G/DL
ALP SERPL-CCNC: 76 U/L (ref 39–117)
ALT SERPL-CCNC: 30 U/L (ref 1–41)
APPEARANCE UR: CLEAR
AST SERPL-CCNC: 24 U/L (ref 1–40)
BACTERIA #/AREA URNS HPF: NORMAL /HPF
BASOPHILS # BLD AUTO: 0.02 10*3/MM3 (ref 0–0.2)
BASOPHILS NFR BLD AUTO: 0.3 % (ref 0–1.5)
BILIRUB SERPL-MCNC: 0.8 MG/DL (ref 0–1.2)
BILIRUB UR QL STRIP: NEGATIVE
BUN SERPL-MCNC: 17 MG/DL (ref 8–23)
BUN/CREAT SERPL: 15.6 (ref 7–25)
CALCIUM SERPL-MCNC: 9.6 MG/DL (ref 8.6–10.5)
CHLORIDE SERPL-SCNC: 103 MMOL/L (ref 98–107)
CHOLEST SERPL-MCNC: 166 MG/DL (ref 0–200)
CO2 SERPL-SCNC: 27.4 MMOL/L (ref 22–29)
COLOR UR: YELLOW
CREAT SERPL-MCNC: 1.09 MG/DL (ref 0.76–1.27)
EOSINOPHIL # BLD AUTO: 0.16 10*3/MM3 (ref 0–0.4)
EOSINOPHIL NFR BLD AUTO: 2.4 % (ref 0.3–6.2)
EPI CELLS #/AREA URNS HPF: NORMAL /HPF (ref 0–10)
ERYTHROCYTE [DISTWIDTH] IN BLOOD BY AUTOMATED COUNT: 12.3 % (ref 12.3–15.4)
GLOBULIN SER CALC-MCNC: 2.1 GM/DL
GLUCOSE SERPL-MCNC: 84 MG/DL (ref 65–99)
GLUCOSE UR QL: NEGATIVE
HCT VFR BLD AUTO: 46.1 % (ref 37.5–51)
HDLC SERPL-MCNC: 63 MG/DL (ref 40–60)
HGB BLD-MCNC: 15.2 G/DL (ref 13–17.7)
HGB UR QL STRIP: NEGATIVE
IMM GRANULOCYTES # BLD AUTO: 0.04 10*3/MM3 (ref 0–0.05)
IMM GRANULOCYTES NFR BLD AUTO: 0.6 % (ref 0–0.5)
KETONES UR QL STRIP: NEGATIVE
LDLC SERPL CALC-MCNC: 87 MG/DL (ref 0–100)
LEUKOCYTE ESTERASE UR QL STRIP: NEGATIVE
LYMPHOCYTES # BLD AUTO: 1.86 10*3/MM3 (ref 0.7–3.1)
LYMPHOCYTES NFR BLD AUTO: 28.4 % (ref 19.6–45.3)
MCH RBC QN AUTO: 28.6 PG (ref 26.6–33)
MCHC RBC AUTO-ENTMCNC: 33 G/DL (ref 31.5–35.7)
MCV RBC AUTO: 86.7 FL (ref 79–97)
MICRO URNS: NORMAL
MICRO URNS: NORMAL
MONOCYTES # BLD AUTO: 0.59 10*3/MM3 (ref 0.1–0.9)
MONOCYTES NFR BLD AUTO: 9 % (ref 5–12)
NEUTROPHILS # BLD AUTO: 3.87 10*3/MM3 (ref 1.7–7)
NEUTROPHILS NFR BLD AUTO: 59.3 % (ref 42.7–76)
NITRITE UR QL STRIP: NEGATIVE
NRBC BLD AUTO-RTO: 0 /100 WBC (ref 0–0.2)
PH UR STRIP: 7.5 [PH] (ref 5–7.5)
PLATELET # BLD AUTO: 211 10*3/MM3 (ref 140–450)
POTASSIUM SERPL-SCNC: 4.4 MMOL/L (ref 3.5–5.2)
PROT SERPL-MCNC: 7 G/DL (ref 6–8.5)
PROT UR QL STRIP: NEGATIVE
PSA SERPL-MCNC: 0.6 NG/ML (ref 0–4)
RBC # BLD AUTO: 5.32 10*6/MM3 (ref 4.14–5.8)
RBC #/AREA URNS HPF: NORMAL /HPF (ref 0–2)
SODIUM SERPL-SCNC: 140 MMOL/L (ref 136–145)
SP GR UR: 1.02 (ref 1–1.03)
TRIGL SERPL-MCNC: 86 MG/DL (ref 0–150)
TSH SERPL DL<=0.005 MIU/L-ACNC: 2.36 UIU/ML (ref 0.27–4.2)
URINALYSIS REFLEX: NORMAL
UROBILINOGEN UR STRIP-MCNC: 0.2 MG/DL (ref 0.2–1)
VLDLC SERPL CALC-MCNC: 16 MG/DL (ref 5–40)
WBC # BLD AUTO: 6.54 10*3/MM3 (ref 3.4–10.8)
WBC #/AREA URNS HPF: NORMAL /HPF (ref 0–5)

## 2021-06-22 ENCOUNTER — OFFICE VISIT (OUTPATIENT)
Dept: INTERNAL MEDICINE | Facility: CLINIC | Age: 66
End: 2021-06-22

## 2021-06-22 VITALS
DIASTOLIC BLOOD PRESSURE: 76 MMHG | TEMPERATURE: 97.1 F | BODY MASS INDEX: 25.96 KG/M2 | RESPIRATION RATE: 18 BRPM | HEIGHT: 74 IN | HEART RATE: 70 BPM | OXYGEN SATURATION: 98 % | WEIGHT: 202.3 LBS | SYSTOLIC BLOOD PRESSURE: 120 MMHG

## 2021-06-22 DIAGNOSIS — Z00.00 HEALTHCARE MAINTENANCE: Primary | ICD-10-CM

## 2021-06-22 DIAGNOSIS — N52.9 ERECTILE DYSFUNCTION, UNSPECIFIED ERECTILE DYSFUNCTION TYPE: ICD-10-CM

## 2021-06-22 DIAGNOSIS — K63.5 POLYP OF COLON, UNSPECIFIED PART OF COLON, UNSPECIFIED TYPE: ICD-10-CM

## 2021-06-22 DIAGNOSIS — G47.33 OBSTRUCTIVE SLEEP APNEA, ADULT: ICD-10-CM

## 2021-06-22 DIAGNOSIS — E78.2 MIXED HYPERLIPIDEMIA: ICD-10-CM

## 2021-06-22 PROCEDURE — 99396 PREV VISIT EST AGE 40-64: CPT | Performed by: INTERNAL MEDICINE

## 2021-06-22 NOTE — PROGRESS NOTES
"Annual Exam (yearly wellness review labs medical issues)      HPI  Uriel Garcia is a 65 y.o. male RTC In yearly CPE, review of medical issues:   1. HLD - on statin. No issues.  Exercise is good, tennis 4x/ week.  \"I cant stand not to exercise\".  Is eating more lately. No red meat all. Fruits and vegetables << daily.   2. JUAN - on CPAP nightly, compliant. Saw sleep med in 3/2021 and no change pressures.  Feels like is helpful. Sleeping well.  \"I love it\", cannot sleep without it.   3. Colon polyps - x  6 on 12/2019 C-scope (1TA and 1 SSA) 11/2019 with Dr. Skaggs --> repeat 3 years.   4. ED - Viagra PRN.  Still helps some.   5. Hx of fracture in R leg, bone bruise L heel, R elbow locking, and R shoulder surgery; all in last 2 years; jw'chauncey at [facility] and worked with PT and bursal removal and frozen shoulder manipulation on R with ortho/ cortisone shot in R elbow with pain better, locking persisted -  Has some issues still with muscles and joint pain all over. Will use Ibuprofen 2 pills prior to tennis and one other time during the week. 'It makes me feel 20 years younger\".    Tennis playing is aggressive.        Review of Systems   Constitutional: Negative for chills, fever, malaise/fatigue, weight gain and weight loss.   HENT: Negative for congestion, hearing loss, odynophagia and sore throat.    Eyes: Negative for discharge, double vision, pain and redness.        Last eye exam ~1/2021; wears contacts     Cardiovascular: Negative for chest pain, dyspnea on exertion, irregular heartbeat, near-syncope, palpitations and syncope.   Respiratory: Negative for cough, shortness of breath, sleep disturbances due to breathing (on CPAP) and snoring.    Endocrine: Negative for polydipsia, polyphagia and polyuria.   Hematologic/Lymphatic: Negative for bleeding problem. Does not bruise/bleed easily.   Skin: Negative for rash and suspicious lesions.   Musculoskeletal: Positive for joint pain (diffuse, variable, mild). Negative " for joint swelling, muscle weakness and stiffness.   Gastrointestinal: Positive for constipation (3-4 weeks, recently. Resolved spontaneously.  No issues in last 6 weeks. ). Negative for diarrhea, dysphagia, heartburn, nausea and vomiting.   Genitourinary: Negative for dysuria, frequency, hematuria, hesitancy and incomplete emptying.   Neurological: Negative for dizziness (vents once monthly afte rtennis. ), focal weakness, headaches and light-headedness.   Psychiatric/Behavioral: Negative for altered mental status (has once monthly issue with low sugar post aggressive tennis game, can correlate with missing meals/ small meals prior. ) and depression. The patient does not have insomnia and is not nervous/anxious.    Allergic/Immunologic: Negative for environmental allergies and persistent infections.       Problem List:    Patient Active Problem List   Diagnosis   • Anxiety   • Hyperlipidemia   • Erectile dysfunction   • Obstructive sleep apnea, adult   • Colon polyp       Medical History:    Past Medical History:   Diagnosis Date   • Anxiety    • Arthritis    • Benign colonic polyp    • Bone bruise 2018    Left heel   • BPPV (benign paroxysmal positional vertigo) 10/20/2016   • Frozen shoulder syndrome 2018    RT SHOULDER; s/p manipulation   • Hyperlipidemia     was on higher dose of Lipitor in past but had elevated LFTs. Added Zetia and now in good control   • Sleep apnea     uses CPAP        Social History:    Social History     Socioeconomic History   • Marital status:      Spouse name: Mamta   • Number of children: 2   • Years of education: Not on file   • Highest education level: Not on file   Tobacco Use   • Smoking status: Never Smoker   • Smokeless tobacco: Never Used   Vaping Use   • Vaping Use: Never used   Substance and Sexual Activity   • Alcohol use: Yes     Comment: social about 8-10 drinks a week; has to 'limit myself'   • Drug use: Yes     Types: Marijuana     Comment: very seldom, as  edible   • Sexual activity: Yes     Partners: Female     Comment: wife only; no hx STD's       Family History:   Family History   Problem Relation Age of Onset   • Hypertension Father    • Nephrolithiasis Father    • Heart attack Maternal Grandmother    • Heart disease Maternal Grandmother         ASCVD   • Depression Other         siblings   • Diabetes Other    • Diabetes Sister         Type I   • Alcohol abuse Brother    • Diabetes Brother         Type II   • Hodgkin's lymphoma Maternal Grandfather    • Dementia Mother         Lewy Body   • Parkinsonism Mother    • No Known Problems Son    • No Known Problems Son    • Malig Hyperthermia Neg Hx        Surgical History:   Past Surgical History:   Procedure Laterality Date   • COLONOSCOPY  2014   • COLONOSCOPY N/A 11/7/2019    Procedure: COLONOSCOPY  to cecum and TI  : cold biopsy polypectomies,;  Surgeon: Sage Skaggs MD;  Location: [facility] ENDOSCOPY;  Service: Gastroenterology   • HERNIA REPAIR Right 2007    Inguinal   • JOINT MANIPULATION Right 9/14/2017    Procedure: RIGHT SHOULDER MANIPULATION;  Surgeon: Jann Barger MD;  Location: [facility] OR OSC;  Service:    • SHOULDER ARTHROSCOPY Right 9/14/2017    Procedure: ARTHROSCOPY LYSIS OF ADHESIONS AND BURSECTOMY RIGHT SHOULDER;  Surgeon: Jann Barger MD;  Location: [facility] OR [facility];  Service:          Current Outpatient Medications:   •  atorvastatin (LIPITOR) 10 MG tablet, TAKE 1 TABLET DAILY, Disp: 90 tablet, Rfl: 3  •  ibuprofen (ADVIL,MOTRIN) 200 MG tablet, Takes 2 tabs PO prior to tennis game, Disp: , Rfl:   •  sildenafil (VIAGRA) 50 MG tablet, TAKE 1 TABLET DAILY AS NEEDED FOR ERECTILE DYSFUNCTION, Disp: 24 tablet, Rfl: 5    Vitals:    06/22/21 1506   BP: 120/76   Pulse: 70   Resp: 18   Temp: 97.1 °F (36.2 °C)   SpO2: 98%     Body mass index is 25.97 kg/m².    Physical Exam  Vitals reviewed.   Constitutional:       General: He is not in acute distress.     Appearance: Normal appearance. He is well-developed.  He is not ill-appearing or toxic-appearing.   HENT:      Head: Normocephalic and atraumatic.      Right Ear: Hearing, tympanic membrane, ear canal and external ear normal.      Left Ear: Hearing, tympanic membrane, ear canal and external ear normal.      Nose: Nose normal.      Mouth/Throat:      Mouth: Mucous membranes are moist. No oral lesions.      Tongue: No lesions.      Pharynx: Oropharynx is clear. Uvula midline. No pharyngeal swelling, oropharyngeal exudate, posterior oropharyngeal erythema or uvula swelling.   Eyes:      General: Lids are normal. No scleral icterus.        Right eye: No discharge.         Left eye: No discharge.      Extraocular Movements: Extraocular movements intact.      Conjunctiva/sclera: Conjunctivae normal.      Pupils: Pupils are equal, round, and reactive to light.   Neck:      Thyroid: No thyroid mass or thyromegaly.      Vascular: No carotid bruit.   Cardiovascular:      Rate and Rhythm: Normal rate and regular rhythm.      Pulses:           Radial pulses are 2+ on the right side and 2+ on the left side.        Dorsalis pedis pulses are 2+ on the right side and 2+ on the left side.        Posterior tibial pulses are 2+ on the right side and 2+ on the left side.      Heart sounds: Normal heart sounds, S1 normal and S2 normal. No murmur heard.   No friction rub. No gallop.    Pulmonary:      Effort: Pulmonary effort is normal. No respiratory distress.      Breath sounds: Normal breath sounds. No wheezing, rhonchi or rales.   Abdominal:      General: Bowel sounds are normal. There is no distension.      Palpations: Abdomen is soft. There is no mass.      Tenderness: There is no abdominal tenderness. There is no guarding or rebound.   Genitourinary:     Prostate: Normal. Not enlarged and not tender.      Rectum: Normal. No external hemorrhoid. Normal anal tone.   Musculoskeletal:         General: No deformity. Normal range of motion.      Cervical back: Full passive range of motion  without pain, normal range of motion and neck supple.      Right lower leg: No edema.      Left lower leg: No edema.   Lymphadenopathy:      Cervical: No cervical adenopathy.      Right cervical: No superficial, deep or posterior cervical adenopathy.     Left cervical: No superficial, deep or posterior cervical adenopathy.      Upper Body:      Right upper body: No supraclavicular, axillary or pectoral adenopathy.      Left upper body: No supraclavicular, axillary or pectoral adenopathy.   Skin:     General: Skin is warm and dry.      Findings: No rash.   Neurological:      Mental Status: He is alert and oriented to person, place, and time.      Cranial Nerves: No cranial nerve deficit.      Sensory: No sensory deficit.      Motor: No weakness, tremor, atrophy or abnormal muscle tone.      Gait: Gait normal.      Deep Tendon Reflexes: Reflexes are normal and symmetric.      Reflex Scores:       Patellar reflexes are 2+ on the right side and 2+ on the left side.       Achilles reflexes are 2+ on the right side and 2+ on the left side.  Psychiatric:         Attention and Perception: Attention normal.         Mood and Affect: Mood normal.         Speech: Speech normal.         Behavior: Behavior normal. Behavior is cooperative.         Thought Content: Thought content normal.         Assessment/ Plan  Diagnoses and all orders for this visit:    Healthcare maintenance    Obstructive sleep apnea, adult    Mixed hyperlipidemia    Erectile dysfunction, unspecified erectile dysfunction type    Polyp of colon, unspecified part of colon, unspecified type        Return in about 1 year (around 6/22/2022) for Annual physical.      Discussion:  Uriel Garcia is a 65 y.o. male RTC In yearly CPE, review of medical issues:   1. HLD -  LDL at goal on statin. LFT's OK. Recall, LDL baseline 194 off statin.      2. JUAN - controlled on CPAP nightly, compliant. UTD sleep med in 3/2021.   3. Colon polyps - x  6 on 12/2019 C-scope (1TA and  1 SSA) 11/2019 with Dr. Skaggs --> repeat 3 years.   4. ED - Viagra PRN.    5. Hx of fracture in R leg, bone bruise L heel, R elbow locking, and R shoulder surgery; jw'chauncey at [facility] and worked with PT and bursal removal and frozen shoulder manipulation on R with ortho/ cortisone shot in R elbow; diffuse joint/ muscle pain with aggressive tennis playing -  Ibuprofen 2 pills prior to tennis, caution on NSAID use. Avoid daily use. Suggested trial of turmeric or collagen supplements to see if any help.   6. HM - labs d/w pt; Flu/ Tdap/ Hep A. COVID - UTD;  Shingrix at pharmacy, counseled; C-scope 11/2019 (6 polyps) --> 3 years; JENNIFER/ PSA OK today; Hep C  Ab (-) 2/2017; c/w exercise; TLC mods discussed with daily fruits/ vegetables.     RTC one year CPE, F labs prior (CBC, CMP, TSH,  U/A, FLP, PSA prior)

## 2021-10-02 ENCOUNTER — FLU SHOT (OUTPATIENT)
Dept: FAMILY MEDICINE CLINIC | Facility: CLINIC | Age: 66
End: 2021-10-02

## 2021-10-02 DIAGNOSIS — Z23 NEED FOR INFLUENZA VACCINATION: Primary | ICD-10-CM

## 2021-10-02 PROCEDURE — 90686 IIV4 VACC NO PRSV 0.5 ML IM: CPT | Performed by: NURSE PRACTITIONER

## 2021-10-02 PROCEDURE — 90471 IMMUNIZATION ADMIN: CPT | Performed by: NURSE PRACTITIONER

## 2021-10-29 DIAGNOSIS — N52.9 ERECTILE DYSFUNCTION, UNSPECIFIED ERECTILE DYSFUNCTION TYPE: ICD-10-CM

## 2021-10-29 RX ORDER — SILDENAFIL 50 MG/1
TABLET, FILM COATED ORAL
Qty: 24 TABLET | Refills: 5 | Status: SHIPPED | OUTPATIENT
Start: 2021-10-29 | End: 2022-01-19 | Stop reason: SDUPTHER

## 2021-11-13 ENCOUNTER — HOSPITAL ENCOUNTER (EMERGENCY)
Facility: HOSPITAL | Age: 66
Discharge: HOME | End: 2021-11-13
Attending: EMERGENCY MEDICINE | Admitting: EMERGENCY MEDICINE

## 2021-11-13 VITALS
SYSTOLIC BLOOD PRESSURE: 148 MMHG | BODY MASS INDEX: 24.25 KG/M2 | TEMPERATURE: 97.5 F | WEIGHT: 195 LBS | OXYGEN SATURATION: 99 % | DIASTOLIC BLOOD PRESSURE: 96 MMHG | HEART RATE: 67 BPM | HEIGHT: 75 IN | RESPIRATION RATE: 16 BRPM

## 2021-11-13 DIAGNOSIS — H53.9 VISION CHANGES: ICD-10-CM

## 2021-11-13 DIAGNOSIS — H57.12 LEFT EYE PAIN: Primary | ICD-10-CM

## 2021-11-13 PROCEDURE — 99283 EMERGENCY DEPT VISIT LOW MDM: CPT

## 2021-11-13 RX ADMIN — FLUORESCEIN SODIUM 1 STRIP: 1 STRIP OPHTHALMIC at 14:39

## 2021-11-13 NOTE — DISCHARGE INSTRUCTIONS
Please return to the emergency department immediately if you develop:  1.  Worsened vision in your left eye  2.  Increased pain in your left eye  3.  Persistent headache (particularly if it occurs with chewing)

## 2021-11-13 NOTE — ED NOTES
Patient was wearing a face mask throughout our encounter.  This RN wore appropriate PPE throughout the encounter.  Hand hygiene was performed before and after patient encounter.        Zhang, Anmol, RN  11/13/21 1300

## 2021-11-13 NOTE — ED PROVIDER NOTES
EMERGENCY DEPARTMENT ENCOUNTER    Room Number:  13/13  Date of encounter:  11/13/2021  PCP: Gregor Gomez MD  Historian: Patient, wife      HPI:  Chief Complaint: Vision change  A complete HPI/ROS/PMH/PSH/SH/FH are unobtainable due to: None    Context: Uriel Garcia is a 65 y.o. male who presents to the ED c/o vision change.  This is been ongoing for approximately 1 month.  This has been persistent.  He describes this is constant but states that it is fluctuating and may be even intermittent.  He states that he noticed some changes to the periphery of his left vision laterally.  It feels like there is haziness there and as if something is in his eye.  He has no headache.  No pain with chewing.      PAST MEDICAL HISTORY  Active Ambulatory Problems     Diagnosis Date Noted   • Anxiety 05/27/2016   • Hyperlipidemia 05/27/2016   • Erectile dysfunction 08/17/2017   • Obstructive sleep apnea, adult 11/09/2017   • Colon polyp 05/02/2019     Resolved Ambulatory Problems     Diagnosis Date Noted   • Benign colonic polyp 05/27/2016   • Edema of lower extremity 05/27/2016   • Chest pain 05/27/2016   • DELONG (dyspnea on exertion) 05/27/2016   • Palpitations 05/27/2016   • Right elbow pain 05/27/2016   • Healthcare maintenance 10/20/2016   • BPPV (benign paroxysmal positional vertigo) 10/20/2016   • Right knee pain 02/16/2017   • Chronic pain of left heel 02/16/2017   • Chronic right shoulder pain 08/02/2017   • Decreased range of motion of right shoulder 08/02/2017   • Decreased range of motion of right elbow 08/02/2017   • Obesity (BMI 30-39.9) 11/09/2017   • Chronic allergic rhinitis 11/09/2017     Past Medical History:   Diagnosis Date   • Arthritis    • Bone bruise 2018   • Frozen shoulder syndrome 2018   • Sleep apnea          PAST SURGICAL HISTORY  Past Surgical History:   Procedure Laterality Date   • COLONOSCOPY  2014   • COLONOSCOPY N/A 11/7/2019    Procedure: COLONOSCOPY  to cecum and TI  : cold biopsy  polypectomies,;  Surgeon: Sage Skaggs MD;  Location: [facility] ENDOSCOPY;  Service: Gastroenterology   • HERNIA REPAIR Right 2007    Inguinal   • JOINT MANIPULATION Right 9/14/2017    Procedure: RIGHT SHOULDER MANIPULATION;  Surgeon: Jann Barger MD;  Location: [facility] OR [facility]SC;  Service:    • SHOULDER ARTHROSCOPY Right 9/14/2017    Procedure: ARTHROSCOPY LYSIS OF ADHESIONS AND BURSECTOMY RIGHT SHOULDER;  Surgeon: Jann Barger MD;  Location: [facility] OR [facility];  Service:          FAMILY HISTORY  Family History   Problem Relation Age of Onset   • Hypertension Father    • Nephrolithiasis Father    • Heart attack Maternal Grandmother    • Heart disease Maternal Grandmother         ASCVD   • Depression Other         siblings   • Diabetes Other    • Diabetes Sister         Type I   • Alcohol abuse Brother    • Diabetes Brother         Type II   • Hodgkin's lymphoma Maternal Grandfather    • Dementia Mother         Lewy Body   • Parkinsonism Mother    • No Known Problems Son    • No Known Problems Son    • Malig Hyperthermia Neg Hx          SOCIAL HISTORY  Social History     Socioeconomic History   • Marital status:      Spouse name: Mamta   • Number of children: 2   Tobacco Use   • Smoking status: Never Smoker   • Smokeless tobacco: Never Used   Vaping Use   • Vaping Use: Never used   Substance and Sexual Activity   • Alcohol use: Yes     Comment: social about 8-10 drinks a week; has to 'limit myself'   • Drug use: Yes     Types: Marijuana     Comment: very seldom, as edible   • Sexual activity: Yes     Partners: Female     Comment: wife only; no hx STD's         ALLERGIES  Patient has no known allergies.        REVIEW OF SYSTEMS  Review of Systems     All systems reviewed and negative except for those discussed in HPI.       PHYSICAL EXAM    I have reviewed the triage vital signs and nursing notes.    ED Triage Vitals   Temp Heart Rate Resp BP SpO2   11/13/21 1247 11/13/21 1247 11/13/21 1247 11/13/21 1259 11/13/21  1247   97.5 °F (36.4 °C) 67 16 148/96 99 %      Temp src Heart Rate Source Patient Position BP Location FiO2 (%)   11/13/21 1247 -- 11/13/21 1259 11/13/21 1259 --   Tympanic  Sitting Left arm        Physical Exam  GENERAL: not distressed  HENT: nares patent  EYES: no scleral icterus, no fluorescein uptake, EOMI, no retinal hemorrhage on funduscopic exam, there is some haziness to the optic disc on the left, visual acuity 20/20 right eye and 20/25 left eye.  CV: regular rhythm, regular rate  RESPIRATORY: normal effort  MUSCULOSKELETAL: no deformity  NEURO: alert, moves all extremities, follows commands  SKIN: warm, dry        LAB RESULTS  No results found for this or any previous visit (from the past 24 hour(s)).    Ordered the above labs and independently reviewed the results.        RADIOLOGY  No Radiology Exams Resulted Within Past 24 Hours    I ordered the above noted radiological studies. Reviewed by me and discussed with radiologist.  See dictation for official radiology interpretation.      PROCEDURES    Procedures      MEDICATIONS GIVEN IN ER    Medications   fluorescein ophthalmic strip 1 strip (has no administration in time range)         PROGRESS, DATA ANALYSIS, CONSULTS, AND MEDICAL DECISION MAKING    All labs have been independently reviewed by me.  All radiology studies have been reviewed by me and discussed with radiologist dictating the report.   EKG's independently viewed and interpreted by me.  Discussion below represents my analysis of pertinent findings related to patient's condition, differential diagnosis, treatment plan and final disposition.    Differential diagnosis includes ocular migraine, retinal attachment, corneal abrasion, giant cell arteritis, corneal ulcer, retinal detachment.    Based on examination, I see no evidence of corneal abrasion.  He does not have any temporal artery tenderness or pain with chewing.  I have low suspicion for giant cell arteritis.    Patient has ability to  follow-up in the near future with Dr. Barbara Watters, ophthalmology.  In fact, he spoke with her this morning.             PPE: The patient wore a surgical mask throughout the entire patient encounter. I wore an N95.    AS OF 14:27 EST VITALS:    BP - 148/96  HR - 67  TEMP - 97.5 °F (36.4 °C) (Tympanic)  O2 SATS - 99%        DIAGNOSIS  Final diagnoses:   Left eye pain   Vision changes         DISPOSITION  DISCHARGE    FOLLOW-UP  Barbara Watters MD  [address] [id number] [phone]    Call in 2 days  to schedule an appointment    [facility] Emergency Department  [address] [facility] [address] [phone]  Go to   If symptoms worsen         Medication List      No changes were made to your prescriptions during this visit.                  Enzo Clark II, MD  11/13/21 1918

## 2022-01-19 DIAGNOSIS — E78.2 MIXED HYPERLIPIDEMIA: ICD-10-CM

## 2022-01-19 DIAGNOSIS — N52.9 ERECTILE DYSFUNCTION, UNSPECIFIED ERECTILE DYSFUNCTION TYPE: ICD-10-CM

## 2022-01-19 RX ORDER — SILDENAFIL 50 MG/1
50 TABLET, FILM COATED ORAL DAILY PRN
Qty: 24 TABLET | Refills: 5 | Status: SHIPPED | OUTPATIENT
Start: 2022-01-19 | End: 2022-01-26

## 2022-01-19 RX ORDER — ATORVASTATIN CALCIUM 10 MG/1
10 TABLET, FILM COATED ORAL DAILY
Qty: 90 TABLET | Refills: 3 | Status: SHIPPED | OUTPATIENT
Start: 2022-01-19 | End: 2023-01-25

## 2022-01-21 ENCOUNTER — TELEPHONE (OUTPATIENT)
Dept: INTERNAL MEDICINE | Facility: CLINIC | Age: 67
End: 2022-01-21

## 2022-01-21 NOTE — TELEPHONE ENCOUNTER
Caller: Uriel Garica    Relationship: Self    Best call back number:[phone]     What medication are you requesting: N/A    What are your current symptoms: ANXIETY, VERY SAD AT TIMES    How long have you been experiencing symptoms: MONTHS    Have you had these symptoms before:    [x] Yes  [] No    Have you been treated for these symptoms before:   [x] Yes  [] No    If a prescription is needed, what is your preferred pharmacy and phone number:    [facility] - [facility] [address] [facility] - [phone] [facility] [phone]     Additional notes: PATIENT WOULD LIKE TO DISCUSS THIS WITH YOU.

## 2022-01-26 ENCOUNTER — OFFICE VISIT (OUTPATIENT)
Dept: INTERNAL MEDICINE | Facility: CLINIC | Age: 67
End: 2022-01-26

## 2022-01-26 VITALS
SYSTOLIC BLOOD PRESSURE: 120 MMHG | HEIGHT: 75 IN | OXYGEN SATURATION: 98 % | HEART RATE: 75 BPM | DIASTOLIC BLOOD PRESSURE: 70 MMHG | BODY MASS INDEX: 25.49 KG/M2 | WEIGHT: 205 LBS | TEMPERATURE: 96.8 F

## 2022-01-26 DIAGNOSIS — F41.1 GAD (GENERALIZED ANXIETY DISORDER): Primary | ICD-10-CM

## 2022-01-26 DIAGNOSIS — N52.9 ERECTILE DYSFUNCTION, UNSPECIFIED ERECTILE DYSFUNCTION TYPE: ICD-10-CM

## 2022-01-26 DIAGNOSIS — F41.0 PANIC ATTACKS: ICD-10-CM

## 2022-01-26 PROCEDURE — 99214 OFFICE O/P EST MOD 30 MIN: CPT | Performed by: INTERNAL MEDICINE

## 2022-01-26 RX ORDER — SILDENAFIL 100 MG/1
100 TABLET, FILM COATED ORAL DAILY PRN
Qty: 24 TABLET | Refills: 2 | Status: SHIPPED | OUTPATIENT
Start: 2022-01-26 | End: 2022-01-26 | Stop reason: SDUPTHER

## 2022-01-26 NOTE — TELEPHONE ENCOUNTER
Caller: Uriel Garcia    Relationship: Self    Best call back number:[phone]    Requested Prescriptions:   Requested Prescriptions     Pending Prescriptions Disp Refills   • sildenafil (Viagra) 100 MG tablet 24 tablet 2     Sig: Take 1 tablet by mouth Daily As Needed for Erectile Dysfunction.        Pharmacy where request should be sent: [id number] - [address] - [phone]  - [phone] FX     Additional details provided by patient: PATIENT STATED THAT THIS PRESCRIPTION WAS SENT IN TO [facility]. PATIENT STATED THAT [facility] DID NOT HAVE A GOOD PRICE FOR THE MEDICATION AND HE WOULD NOT BE PICKING IT UP THERE. PATIENT WOULD LIKE THE PRESCRIPTION TO BE SENT TO THE [facility] LISTED ABOVE. PLEASE ADVISE.       Does the patient have less than a 3 day supply:  [] Yes  [x] No    Manju Luo Rep   01/26/22 14:31 EST

## 2022-01-26 NOTE — PROGRESS NOTES
"Anxiety      HPI  Uriel Garcia is a 66 y.o. male RTC in acute care:   Notes that \"I called your for an antidepressant'.  Recalls used med about 10 years ago during 'really sad time in my life'. Was on med for about 2 years and then stopped as was picking up weight and preferred to 'deal with things on my own'. Thinks took sertraline at the time.  After stopped med 'things were OK'. Recalls during that time life at work was 'very stimulating'.   Now that retired feels like still has ' personality' and needs to stay busy. Feels like is good when playing tennis but is not good when not playing. Feels like has some real peaks of anxiety at times and feeling like going to 'blow up' when discussing financial issues with sons and issues with wife.  Has had some panic attacks on the tennis court a few times.   Acknowledges that 'look at my life, I have everything'.  However, feels 'blah'. Feels like can be more worried about things that will go wrong that right.  Will use THC at times prior to social situations and does help 'me to calm down'.  Will use only about once q 2-3 months.   Wife described pt to friends recently, 'he is like a time bomb waiting to go off'. \"That is the way she felt\".   Notes FHx with siblings with all being on anti-depressants.   Never seen therapist in past.      Review of Systems   Constitutional: Negative for chills and fever.   Psychiatric/Behavioral: Negative for altered mental status, depression, substance abuse and suicidal ideas. The patient is nervous/anxious.        The following portions of the patient's history were reviewed and updated as appropriate: allergies, current medications, past medical history, past social history and problem list.      Current Outpatient Medications:   •  atorvastatin (LIPITOR) 10 MG tablet, Take 1 tablet by mouth Daily., Disp: 90 tablet, Rfl: 3  •  ibuprofen (ADVIL,MOTRIN) 200 MG tablet, Takes 2 tabs PO prior to tennis game, Disp: , Rfl:   •  " "sertraline (Zoloft) 50 MG tablet, Take one half tablet PO daily for 2 weeks, then increase to one full tablet PO daily, Disp: 30 tablet, Rfl: 5  •  sildenafil (Viagra) 100 MG tablet, Take 1 tablet by mouth Daily As Needed for Erectile Dysfunction., Disp: 24 tablet, Rfl: 2    Vitals:    01/26/22 1140   BP: 120/70   Pulse: 75   Temp: 96.8 °F (36 °C)   SpO2: 98%   Weight: 93 kg (205 lb)   Height: 190.5 cm (75\")     Body mass index is 25.62 kg/m².      Physical Exam  Vitals reviewed.   Constitutional:       General: He is not in acute distress.     Appearance: He is well-developed. He is not ill-appearing or toxic-appearing.   HENT:      Head: Normocephalic.   Eyes:      General: No scleral icterus.     Pupils: Pupils are equal, round, and reactive to light.   Cardiovascular:      Rate and Rhythm: Normal rate and regular rhythm.      Heart sounds: No murmur heard.  No gallop.    Pulmonary:      Effort: Pulmonary effort is normal. No respiratory distress.   Neurological:      General: No focal deficit present.      Mental Status: He is alert and oriented to person, place, and time.      Cranial Nerves: No cranial nerve deficit, dysarthria or facial asymmetry.      Motor: No weakness or tremor.      Gait: Gait normal.   Psychiatric:         Behavior: Behavior normal.         Thought Content: Thought content normal.         Assessment/ Plan  Diagnoses and all orders for this visit:    KOKI (generalized anxiety disorder)  -     sertraline (Zoloft) 50 MG tablet; Take one half tablet PO daily for 2 weeks, then increase to one full tablet PO daily  -     Ambulatory Referral to Psychology    Panic attacks  -     Ambulatory Referral to Psychology    Erectile dysfunction, unspecified erectile dysfunction type  -     sildenafil (Viagra) 100 MG tablet; Take 1 tablet by mouth Daily As Needed for Erectile Dysfunction.        Return in about 4 weeks (around 2/23/2022) for Recheck.      Discussion:  Uriel Garcia is a 66 y.o. male with " distant hx of SSRI use RTC in acute care (new issue to examiner) with recurrence of anxiety progressive in last few years since senior care. Pt is active but has some decline in outlook and feels like needs stimulation to keep anxiety under control.  Pt has moderate scores on GAD7 today.  I had long d/w pt about wax and wane cycles of mood disorders and importance of both meds and talk therapy. Pt desires SSRI retrial and will start sertraline given past efficacy and tolerance in pt.  I had long d/w pt about talk therapy, positive data on efficacy, and attempted to work through some stigma on psychology tx. Pt accepts referral list and I strongly encouraged pt to consider talk therapy for synergistic effects with SSRI and for long term anxiety mgmnt strategies.    Reviewed med side effects with pt.   Reassess at visit in 4 weeks.     Refilled PDE5 med for ED for pt today, increasing dose due to some decline in efficacy.  Reviewed side effects with pt.

## 2022-01-27 RX ORDER — SILDENAFIL 100 MG/1
100 TABLET, FILM COATED ORAL DAILY PRN
Qty: 24 TABLET | Refills: 2 | Status: SHIPPED | OUTPATIENT
Start: 2022-01-27

## 2022-02-24 ENCOUNTER — OFFICE VISIT (OUTPATIENT)
Dept: INTERNAL MEDICINE | Facility: CLINIC | Age: 67
End: 2022-02-24

## 2022-02-24 VITALS
SYSTOLIC BLOOD PRESSURE: 102 MMHG | TEMPERATURE: 97.1 F | HEART RATE: 62 BPM | HEIGHT: 75 IN | DIASTOLIC BLOOD PRESSURE: 70 MMHG | WEIGHT: 206 LBS | BODY MASS INDEX: 25.61 KG/M2 | OXYGEN SATURATION: 98 %

## 2022-02-24 DIAGNOSIS — B00.2 ORAL HERPES SIMPLEX INFECTION: ICD-10-CM

## 2022-02-24 DIAGNOSIS — F41.1 GAD (GENERALIZED ANXIETY DISORDER): Primary | ICD-10-CM

## 2022-02-24 PROCEDURE — 99214 OFFICE O/P EST MOD 30 MIN: CPT | Performed by: INTERNAL MEDICINE

## 2022-02-24 RX ORDER — ACYCLOVIR 50 MG/G
1 OINTMENT TOPICAL EVERY 4 HOURS
Qty: 15 G | Refills: 1 | Status: SHIPPED | OUTPATIENT
Start: 2022-02-24 | End: 2022-02-25 | Stop reason: SDUPTHER

## 2022-02-24 NOTE — PROGRESS NOTES
"Anxiety      HPI  Uriel Garcia is a 66 y.o. male RTC in acute care:   Has starterd on sertraline and notes 'it is helping a bit'. NO side effects.  Notes that 'I seem calmer'.  Feels like normally 'runs pretty hot', but just seems like is more patient with issues and calmer overall.  Life has been calm overall and feels like 'not been tested much'.   No panic events.   Has been on med for 4 weeks.   Did meet with Dr. Narvaez yesterday in talk therapy.  [place] like was good meeting.  Feels like will be a good fit.  Next visit delayed by vacation schedules.    Has fever blister on R lower lip.  Started 2 weeks ago, initial lesions scabbed over.  Then has bump next to it.  Second bump there only a few days.  Some pain, no much.    Review of Systems   Constitutional: Negative for chills and fever.   Skin: Positive for suspicious lesions (fever blister on R lower lip. \"Could that be a side effect?\").   Gastrointestinal: Negative for nausea and vomiting.   Neurological: Negative for headaches.   Psychiatric/Behavioral: Negative for altered mental status. The patient is nervous/anxious (improved).        The following portions of the patient's history were reviewed and updated as appropriate: allergies, current medications, past medical history, past social history and problem list.      Current Outpatient Medications:   •  atorvastatin (LIPITOR) 10 MG tablet, Take 1 tablet by mouth Daily., Disp: 90 tablet, Rfl: 3  •  ibuprofen (ADVIL,MOTRIN) 200 MG tablet, Takes 2 tabs PO prior to tennis game, Disp: , Rfl:   •  sertraline (Zoloft) 50 MG tablet, one full tablet PO daily, Disp: 30 tablet, Rfl: 5  •  sildenafil (Viagra) 100 MG tablet, Take 1 tablet by mouth Daily As Needed for Erectile Dysfunction., Disp: 24 tablet, Rfl: 2  •  acyclovir (Zovirax) 5 % ointment, Apply 1 application topically to the appropriate area as directed Every 4 (Four) Hours., Disp: 15 g, Rfl: 1    Vitals:    02/24/22 1424   BP: 102/70   Pulse: 62   Temp: " "97.1 °F (36.2 °C)   SpO2: 98%   Weight: 93.4 kg (206 lb)   Height: 190.5 cm (75\")     Body mass index is 25.75 kg/m².      Physical Exam  Skin:               Assessment/ Plan  Diagnoses and all orders for this visit:    KOKI (generalized anxiety disorder)  -     sertraline (Zoloft) 50 MG tablet; one full tablet PO daily    Oral herpes simplex infection  -     acyclovir (Zovirax) 5 % ointment; Apply 1 application topically to the appropriate area as directed Every 4 (Four) Hours.        Return for Next scheduled follow up.      Discussion:  Uriel Garcia is a 66 y.o. male RTC in f/u:  1. KOKI - distant hx of SSRI use, recurrent sx noted last visit. Is on sertraline now with improved anxiety sx at 4 week earl. No side effects noted.  Pleased with med and progress. Met talk therapist yesterday for first session and feels like will be good fit.  C/W therapy plan.  Reassess at f/u.  2. Oral HSV, R lower lip - acute issue.  First lesion 2 weeks ago, resolving.  Second lesion adjacent a few days.  Will apply acyclovir cream to lesion and see if can resolve. If papular lesion does not resolve to scab lesion, pt to call.        RTC as planned.         "

## 2022-02-25 ENCOUNTER — TELEPHONE (OUTPATIENT)
Dept: INTERNAL MEDICINE | Facility: CLINIC | Age: 67
End: 2022-02-25

## 2022-02-25 DIAGNOSIS — B00.2 ORAL HERPES SIMPLEX INFECTION: ICD-10-CM

## 2022-02-25 RX ORDER — ACYCLOVIR 50 MG/G
1 OINTMENT TOPICAL EVERY 4 HOURS
Qty: 15 G | Refills: 1 | Status: SHIPPED | OUTPATIENT
Start: 2022-02-25 | End: 2022-06-29

## 2022-02-25 NOTE — TELEPHONE ENCOUNTER
USHA Caller: Uriel Garcia    Relationship: Self    Best call back number: [phone] (H)    Requested Prescriptions:   acyclovir (Zovirax) 5 % ointment     Pharmacy where request should be sent:  [id number] - [place], [place] - [address] [place] [address] PKY - [phone]  - [phone] FX        Additional details provided by patient: PATIENT WOULD LIKE TO HAVE THIS MEDICATION TO GO THE PHARMACY LISTED ABOVE DUE TO THE COST BEING CHEAPER.    Does the patient have less than a 3 day supply:  [] Yes  [] No    Manju Moreno Rep   02/25/22 14:43 EST         THANKS

## 2022-03-11 ENCOUNTER — APPOINTMENT (OUTPATIENT)
Dept: SLEEP MEDICINE | Facility: HOSPITAL | Age: 67
End: 2022-03-11

## 2022-05-02 ENCOUNTER — OFFICE VISIT (OUTPATIENT)
Dept: SLEEP MEDICINE | Facility: HOSPITAL | Age: 67
End: 2022-05-02

## 2022-05-02 VITALS
BODY MASS INDEX: 24.87 KG/M2 | DIASTOLIC BLOOD PRESSURE: 72 MMHG | HEART RATE: 65 BPM | OXYGEN SATURATION: 98 % | SYSTOLIC BLOOD PRESSURE: 135 MMHG | HEIGHT: 75 IN | WEIGHT: 200 LBS

## 2022-05-02 DIAGNOSIS — F41.1 GAD (GENERALIZED ANXIETY DISORDER): ICD-10-CM

## 2022-05-02 DIAGNOSIS — G47.33 OBSTRUCTIVE SLEEP APNEA: Primary | ICD-10-CM

## 2022-05-02 PROCEDURE — G0463 HOSPITAL OUTPT CLINIC VISIT: HCPCS

## 2022-05-02 NOTE — PROGRESS NOTES
"[facility]  Telephone: [phone] / Fax: [phone] [place]  Telephone: [phone] / Fax: [phone] Judy Mayberry    PCP: Gregor Gomez MD    Reason for visit: JUAN f/u    Uriel Garcia is a 66 y.o.male  was last seen at [facility] sleep lab in March 2021. He got replacement CPAP through [facility] last year. Auto CPAP 5-15cm. He does not use the humidifier on the new machine. No snoring or gasping respirations reported. He uses nasal pillow mask that fits well. His sleep schedule is 10pm-8:30am. ESS is 2. Interval history reviewed. He started Zoloft 50mg for anxiety.  It works well and seems to help. He sleeps from 10pm-8:30am. He denies snoring or gasping respirations on the machine.    SH- no tobacco, drinks 9 alc per week, 1 caffeine per day.    ROS- negative.    DME [facility]    Current Medications:    Current Outpatient Medications:   •  acyclovir (Zovirax) 5 % ointment, Apply 1 application topically to the appropriate area as directed Every 4 (Four) Hours., Disp: 15 g, Rfl: 1  •  atorvastatin (LIPITOR) 10 MG tablet, Take 1 tablet by mouth Daily., Disp: 90 tablet, Rfl: 3  •  ibuprofen (ADVIL,MOTRIN) 200 MG tablet, Takes 2 tabs PO prior to tennis game, Disp: , Rfl:   •  sertraline (Zoloft) 50 MG tablet, one full tablet PO daily, Disp: 30 tablet, Rfl: 5  •  sildenafil (Viagra) 100 MG tablet, Take 1 tablet by mouth Daily As Needed for Erectile Dysfunction., Disp: 24 tablet, Rfl: 2   also entered in Sleep Questionnaire    Patient  has a past medical history of Anxiety, Arthritis, Benign colonic polyp, Bone bruise (2018), BPPV (benign paroxysmal positional vertigo) (10/20/2016), Frozen shoulder syndrome (2018), Hyperlipidemia, and Sleep apnea.    I have reviewed the Past Medical History, Past Surgical History, Social History and Family History.    Vital Signs /72   Pulse 65   Ht 190.5 cm (75\")   Wt 90.7 kg (200 lb)   SpO2 98%   BMI 25.00 kg/m²  Body mass index is 25 kg/m².  "   General Alert and oriented. No acute distress noted   Pharynx/Throat Class III  Mallampati airway, large tongue, no evidence of redundant lateral pharyngeal tissue. No oral lesions. No thrush. Moist mucous membranes.   Head Normocephalic. Symmetrical. Atraumatic.    Nose No septal deviation. No drainage   Chest Wall Normal shape. Symmetric expansion with respiration. No tenderness.   Neck Trachea midline, no thyromegaly or adenopathy    Lungs Clear to auscultation bilaterally. No wheezes. No rhonchi. No rales. Respirations regular, even and unlabored.   Heart Regular rhythm and normal rate. Normal S1 and S2. No murmur   Abdomen Soft, non-tender and non-distended. Normal bowel sounds. No masses.   Extremities Moves all extremities well. No edema   Psychiatric Normal mood and affect.     Testing:  · Download 1/28/22-4/27.22 100% use with average nightly use of 9 hours and 33 minutes on auto CPAP 5-15 cm H2O, AHI 2    Study-  · 3/6/14  FINDINGS: Diagnostic study from 9:42 p.m. to 5:55 a.m. Sleep efficiency was  somewhat poor at 77%, but there was adequate sleep time of 6.33 hours for  diagnostic purposes. Sleep distribution showed 7.8% stage I sleep, 69.2% stage  II sleep, 12.8% slow-wave sleep, 10.3% REM sleep. Apnea-hypopnea index is 14.7  events an hour indicating moderately severe obstructive sleep apnea which is  almost severe by respiratory disturbance index criteria at 28 events an hour.  In the supine position, very, very severe obstructive sleep apnea is present  with AHI of 76.9. In REM sleep, AHI was 10.8 with RDI of 17. Oxygen saturation  fell below 90% for 6% of total sleep time indicating significant sleep-related  hypoxemia. Arousal index high at 41 and patient had 68.12% time snoring.     Impression:  1. Obstructive sleep apnea    2. KOKI (generalized anxiety disorder)          Plan:  Uriel is doing great on the new CPAP machine. He denies snoring or gasping respirations while using it. I verified the  pressures. JUAN is well controlled.  AHI appears to be within adequate range. I reviewed download report and original sleep study report with the patient.  I gave him a new smart card to put in the machine.  Machine seems to control the snoring and apneas. I asked him to call us if he notices increase in snoring or any issues on the machine so that we change pressures remotely.  Anxiety is now well controlled on the Zoloft through PCP.    Weight loss will be strongly beneficial to reduce the severity of sleep-disordered breathing.  Caution during activities that require prolonged concentration is strongly advised if sleepiness returns. Changing of PAP supplies regularly is important for effective use.         Follow up with Dr. Arambula in one year    Thank you for allowing me to participate in your patient's care.      JONH Heath  [facility]  Phone: [phone]      Part of this note may be an electronic transcription/translation of spoken language to printed text using the Dragon Dictation System.

## 2022-06-08 DIAGNOSIS — F41.1 GAD (GENERALIZED ANXIETY DISORDER): ICD-10-CM

## 2022-06-08 DIAGNOSIS — N52.9 ERECTILE DYSFUNCTION, UNSPECIFIED ERECTILE DYSFUNCTION TYPE: ICD-10-CM

## 2022-06-08 DIAGNOSIS — Z12.5 SCREENING FOR PROSTATE CANCER: ICD-10-CM

## 2022-06-08 DIAGNOSIS — E78.2 MIXED HYPERLIPIDEMIA: Primary | ICD-10-CM

## 2022-06-13 ENCOUNTER — TELEPHONE (OUTPATIENT)
Dept: INTERNAL MEDICINE | Facility: CLINIC | Age: 67
End: 2022-06-13

## 2022-06-13 NOTE — TELEPHONE ENCOUNTER
Caller: Uriel Garcia    Relationship: Self    Best call back number: [phone] (H)    What is the best time to reach you: ANYTIME    Who are you requesting to speak with (clinical staff, provider,  specific staff member): CLINICAL LILA    Do you know the name of the person who called:      What was the call regarding:  PATIENT CALLED TO ADVISE THAT HE HAS NOT BEEN FEELING WELL FOR ABOUT 1 WEEK NOW- TIRED AND NAUSEA, DID NOT RUN A FEVER THE 1ST 3 DAYS, AND ON DAY 4 HE STARTED TO RUN A FEVER AT NIGHT ONLY, BUT WAS ABLE TO EAT A LITTLE BIT. PATIENT STATES THAT HIS ABDOMEN WAS AND LOOKED BLOATED,  FELT LIKE HE NEEDED TO HAVE A BOWEL MOVEMENT,  BUT ONLY BELCHED A LOT. PATIENT STATES THAT HE WAS ABLE TO POO A LITTLE BIT AND IT WAS DENG COLORED VERY LIGHT. PATIENT STATES THAT HIS NAUSEA WENT AWAY AND HIS ENERGY LEVEL STARTED TO COME BACK. PATIENT STATES THAT HE STARTED TO HAVE NIGHT SWEATS AND STILL HAVING THEM.    Do you require a callback: YES        THANKS

## 2022-06-14 ENCOUNTER — OFFICE VISIT (OUTPATIENT)
Dept: INTERNAL MEDICINE | Facility: CLINIC | Age: 67
End: 2022-06-14

## 2022-06-14 VITALS
HEIGHT: 75 IN | BODY MASS INDEX: 24.37 KG/M2 | SYSTOLIC BLOOD PRESSURE: 110 MMHG | DIASTOLIC BLOOD PRESSURE: 80 MMHG | WEIGHT: 196 LBS | TEMPERATURE: 97.1 F | HEART RATE: 69 BPM | OXYGEN SATURATION: 98 %

## 2022-06-14 DIAGNOSIS — Z00.00 HEALTHCARE MAINTENANCE: Primary | ICD-10-CM

## 2022-06-14 DIAGNOSIS — R19.5 STOOL COLOR ABNORMAL: ICD-10-CM

## 2022-06-14 DIAGNOSIS — Z78.9 ALCOHOL USE: ICD-10-CM

## 2022-06-14 DIAGNOSIS — E78.2 MIXED HYPERLIPIDEMIA: ICD-10-CM

## 2022-06-14 DIAGNOSIS — N52.9 ERECTILE DYSFUNCTION, UNSPECIFIED ERECTILE DYSFUNCTION TYPE: ICD-10-CM

## 2022-06-14 DIAGNOSIS — R19.4 BOWEL HABIT CHANGES: ICD-10-CM

## 2022-06-14 DIAGNOSIS — R11.0 NAUSEA: Primary | ICD-10-CM

## 2022-06-14 DIAGNOSIS — E86.0 DEHYDRATION: ICD-10-CM

## 2022-06-14 DIAGNOSIS — R11.0 NAUSEA: ICD-10-CM

## 2022-06-14 PROCEDURE — 99214 OFFICE O/P EST MOD 30 MIN: CPT | Performed by: INTERNAL MEDICINE

## 2022-06-14 NOTE — PROGRESS NOTES
"Nausea (Belching, fatigue, bloating, constipation, fever at night)      HPI  Uriel Garcia is a 66 y.o. male RTC in acute care:   One week ago played golf at [employer] and was really hot and 'dehydrated'. Did have water when played.  Came home and sat on back porch and drank 'too much' (2 stadium cups of vodka and 7UP).  Ate dinner, 'something small'. No BM that night.  Went to bed.  Next AM felt OK.  Later that day had dental cleaning and then 8 hours later had slow onset of nausea, stomach bloating, constipated.  Used Mylanta, did not help.  Slept in bed nearly constantly for a day.  \"I slept every chance I could'.  No fevers at time.   2 days later started having BM's and felt less nauseaous.  Stools were 'lizbeth colored' and thin in caliber.  ENergy got better and BM's started getting 'darker and more normal'.  Felt well going into weekend and then had tennis tournament that weekend. 'Played a lot of tennis and felt not 100% but good'. Came home from tennis tournament and had intense night sweat, but did not wake pt up.  Had 2 nights in a row.  Decided to call and assess here. No night sweats last night.  No fever documented.   Energy is back to normal today.       Review of Systems   Constitutional: Positive for night sweats (x 2 nights, none last night.). Negative for chills, fever and weight loss.   HENT: Negative for congestion, odynophagia and sore throat.    Cardiovascular: Negative for dyspnea on exertion.   Respiratory: Negative for cough and shortness of breath.    Skin: Negative for rash and suspicious lesions.   Musculoskeletal: Negative for back pain (None with this.  ).   Gastrointestinal: Positive for bloating (resolved), abdominal pain (never TTP, only with bloating. ), change in bowel habit, constipation (resolved) and nausea (resolved). Negative for dysphagia, heartburn and vomiting.   Genitourinary: Negative for dysuria and hematuria.       The following portions of the patient's history were " "reviewed and updated as appropriate: allergies, current medications, past medical history, past social history and problem list.      Current Outpatient Medications:   •  acyclovir (Zovirax) 5 % ointment, Apply 1 application topically to the appropriate area as directed Every 4 (Four) Hours., Disp: 15 g, Rfl: 1  •  atorvastatin (LIPITOR) 10 MG tablet, Take 1 tablet by mouth Daily., Disp: 90 tablet, Rfl: 3  •  ibuprofen (ADVIL,MOTRIN) 200 MG tablet, Takes 2 tabs PO prior to tennis game, Disp: , Rfl:   •  sertraline (Zoloft) 50 MG tablet, one full tablet PO daily, Disp: 30 tablet, Rfl: 5  •  sildenafil (Viagra) 100 MG tablet, Take 1 tablet by mouth Daily As Needed for Erectile Dysfunction., Disp: 24 tablet, Rfl: 2    Vitals:    06/14/22 0808   BP: 110/80   Pulse: 69   Temp: 97.1 °F (36.2 °C)   SpO2: 98%   Weight: 88.9 kg (196 lb)   Height: 190.5 cm (75\")     Body mass index is 24.5 kg/m².      Physical Exam  Vitals reviewed.   Constitutional:       General: He is not in acute distress.     Appearance: He is well-developed. He is not ill-appearing or toxic-appearing.   HENT:      Head: Normocephalic and atraumatic.      Mouth/Throat:      Mouth: Mucous membranes are moist. No oral lesions.      Tongue: No lesions.      Pharynx: Oropharynx is clear. No pharyngeal swelling, oropharyngeal exudate, posterior oropharyngeal erythema or uvula swelling.   Eyes:      General: No scleral icterus.     Conjunctiva/sclera: Conjunctivae normal.      Pupils: Pupils are equal, round, and reactive to light.   Cardiovascular:      Rate and Rhythm: Normal rate and regular rhythm.      Pulses:           Carotid pulses are 2+ on the right side and 2+ on the left side.       Radial pulses are 2+ on the right side and 2+ on the left side.      Heart sounds: Normal heart sounds.   Pulmonary:      Effort: Pulmonary effort is normal. No respiratory distress.      Breath sounds: Normal breath sounds. No wheezing, rhonchi or rales.   Abdominal: "      General: Abdomen is flat. Bowel sounds are normal. There is no distension.      Palpations: Abdomen is soft. There is no mass.      Tenderness: There is no abdominal tenderness. There is no right CVA tenderness, left CVA tenderness, guarding or rebound. Negative signs include Scott's sign.   Musculoskeletal:      Cervical back: Normal range of motion and neck supple. No muscular tenderness.      Right lower leg: No edema.      Left lower leg: No edema.   Skin:     Coloration: Skin is not jaundiced.   Neurological:      Mental Status: He is alert and oriented to person, place, and time.      Cranial Nerves: No cranial nerve deficit.      Gait: Gait normal.   Psychiatric:         Attention and Perception: Attention normal.         Mood and Affect: Mood and affect normal.         Behavior: Behavior normal.         Thought Content: Thought content normal.         Assessment/ Plan  Diagnoses and all orders for this visit:    Nausea  -     Cancel: Amylase  -     Cancel: Lipase  -     CK  -     US abdomen limited; Future    Bowel habit changes  -     Cancel: Amylase  -     Cancel: Lipase  -     CK  -     US abdomen limited; Future    Stool color abnormal  -     Cancel: Amylase  -     Cancel: Lipase  -     CK  -     US abdomen limited; Future    Alcohol use  -     Cancel: Amylase  -     Cancel: Lipase  -     CK  -     US abdomen limited; Future    Dehydration  -     CK  -     US abdomen limited; Future        Return for Next scheduled follow up.      Discussion:  Uriel Garcia is a 66 y.o. male RTC in acute care (new issue to examiner) with one week of variable GI sx.  Started with marked constipation and nausea/ bloating after drank 2 large ETOH drinks after dehydrating golf round.  As constipation resolved noted lizbeth colored stools and decline in energy.  Recovered largely over last week, but some night sweats over weekend, now also resolved.  Feeling well today, worried well.  Exam is benign today.  Ddx here  includes cholelithiasis vs. Cholecystitis vs. Acute pancreatitis (resolving) vs. Less likely gastritis/ PUD vs. Resolving rhabdo/ BRIDGETT.   Will check annual exam labs today (planned for this week) and add amylase, lipase, and CK.  Will get RUQ US to eval.  No diet or activity restrictions for now, ETOH moderation advised (pt fully willing to minimize intake).  Will f/u as planned but pt knows to call sooner if any recurrent sx.     RTC as planned.     What is the primary reason for your visit?: Other  Please describe your symptoms.: Nausia, lizbeth colored stool, fever, fatigue. Feeling better now in all the above but ran fever the past 3 nights.  Have you had these symptoms before?: No  How long have you been having these symptoms?: 5-7 days

## 2022-06-15 DIAGNOSIS — R11.0 NAUSEA: Primary | ICD-10-CM

## 2022-06-15 DIAGNOSIS — R74.8 SERUM AMYLASE ELEVATED: ICD-10-CM

## 2022-06-15 DIAGNOSIS — R14.0 BLOATING: ICD-10-CM

## 2022-06-15 DIAGNOSIS — Z78.9 ALCOHOL USE: ICD-10-CM

## 2022-06-15 LAB
ALBUMIN SERPL-MCNC: 4.9 G/DL (ref 3.8–4.8)
ALBUMIN/GLOB SERPL: 2.2 {RATIO} (ref 1.2–2.2)
ALP SERPL-CCNC: 78 IU/L (ref 44–121)
ALT SERPL-CCNC: 22 IU/L (ref 0–44)
AMYLASE SERPL-CCNC: 125 U/L (ref 31–110)
APPEARANCE UR: CLEAR
AST SERPL-CCNC: 21 IU/L (ref 0–40)
BACTERIA #/AREA URNS HPF: NORMAL /[HPF]
BASOPHILS # BLD AUTO: 0 X10E3/UL (ref 0–0.2)
BASOPHILS NFR BLD AUTO: 0 %
BILIRUB SERPL-MCNC: 0.7 MG/DL (ref 0–1.2)
BILIRUB UR QL STRIP: NEGATIVE
BUN SERPL-MCNC: 16 MG/DL (ref 8–27)
BUN/CREAT SERPL: 15 (ref 10–24)
CALCIUM SERPL-MCNC: 9.8 MG/DL (ref 8.6–10.2)
CASTS URNS QL MICRO: NORMAL /LPF
CHLORIDE SERPL-SCNC: 100 MMOL/L (ref 96–106)
CHOLEST SERPL-MCNC: 149 MG/DL (ref 100–199)
CK SERPL-CCNC: 130 U/L (ref 41–331)
CO2 SERPL-SCNC: 26 MMOL/L (ref 20–29)
COLOR UR: YELLOW
CREAT SERPL-MCNC: 1.08 MG/DL (ref 0.76–1.27)
EGFRCR SERPLBLD CKD-EPI 2021: 76 ML/MIN/1.73
EOSINOPHIL # BLD AUTO: 0.1 X10E3/UL (ref 0–0.4)
EOSINOPHIL NFR BLD AUTO: 2 %
EPI CELLS #/AREA URNS HPF: NORMAL /HPF (ref 0–10)
ERYTHROCYTE [DISTWIDTH] IN BLOOD BY AUTOMATED COUNT: 12.9 % (ref 11.6–15.4)
GLOBULIN SER CALC-MCNC: 2.2 G/DL (ref 1.5–4.5)
GLUCOSE SERPL-MCNC: 94 MG/DL (ref 65–99)
GLUCOSE UR QL STRIP: NEGATIVE
HCT VFR BLD AUTO: 44.1 % (ref 37.5–51)
HDLC SERPL-MCNC: 41 MG/DL
HGB BLD-MCNC: 14.4 G/DL (ref 13–17.7)
HGB UR QL STRIP: NEGATIVE
IMM GRANULOCYTES # BLD AUTO: 0 X10E3/UL (ref 0–0.1)
IMM GRANULOCYTES NFR BLD AUTO: 1 %
KETONES UR QL STRIP: ABNORMAL
LDLC SERPL CALC-MCNC: 90 MG/DL (ref 0–99)
LEUKOCYTE ESTERASE UR QL STRIP: NEGATIVE
LIPASE SERPL-CCNC: 58 U/L (ref 13–78)
LYMPHOCYTES # BLD AUTO: 1.7 X10E3/UL (ref 0.7–3.1)
LYMPHOCYTES NFR BLD AUTO: 25 %
MCH RBC QN AUTO: 28.5 PG (ref 26.6–33)
MCHC RBC AUTO-ENTMCNC: 32.7 G/DL (ref 31.5–35.7)
MCV RBC AUTO: 87 FL (ref 79–97)
MICRO URNS: ABNORMAL
MICRO URNS: ABNORMAL
MONOCYTES # BLD AUTO: 0.6 X10E3/UL (ref 0.1–0.9)
MONOCYTES NFR BLD AUTO: 9 %
NEUTROPHILS # BLD AUTO: 4.4 X10E3/UL (ref 1.4–7)
NEUTROPHILS NFR BLD AUTO: 63 %
NITRITE UR QL STRIP: NEGATIVE
PH UR STRIP: 7 [PH] (ref 5–7.5)
PLATELET # BLD AUTO: 229 X10E3/UL (ref 150–450)
POTASSIUM SERPL-SCNC: 4.6 MMOL/L (ref 3.5–5.2)
PROT SERPL-MCNC: 7.1 G/DL (ref 6–8.5)
PROT UR QL STRIP: ABNORMAL
PSA SERPL-MCNC: 0.5 NG/ML (ref 0–4)
RBC # BLD AUTO: 5.05 X10E6/UL (ref 4.14–5.8)
RBC #/AREA URNS HPF: NORMAL /HPF (ref 0–2)
SODIUM SERPL-SCNC: 140 MMOL/L (ref 134–144)
SP GR UR STRIP: 1.02 (ref 1–1.03)
TRIGL SERPL-MCNC: 97 MG/DL (ref 0–149)
URINALYSIS REFLEX: ABNORMAL
UROBILINOGEN UR STRIP-MCNC: 1 MG/DL (ref 0.2–1)
VLDLC SERPL CALC-MCNC: 18 MG/DL (ref 5–40)
WBC # BLD AUTO: 7 X10E3/UL (ref 3.4–10.8)
WBC #/AREA URNS HPF: NORMAL /HPF (ref 0–5)

## 2022-06-20 ENCOUNTER — HOSPITAL ENCOUNTER (OUTPATIENT)
Dept: ULTRASOUND IMAGING | Facility: HOSPITAL | Age: 67
Discharge: HOME | End: 2022-06-20
Admitting: INTERNAL MEDICINE

## 2022-06-20 DIAGNOSIS — R19.5 STOOL COLOR ABNORMAL: ICD-10-CM

## 2022-06-20 DIAGNOSIS — E86.0 DEHYDRATION: ICD-10-CM

## 2022-06-20 DIAGNOSIS — R19.4 BOWEL HABIT CHANGES: ICD-10-CM

## 2022-06-20 DIAGNOSIS — Z78.9 ALCOHOL USE: ICD-10-CM

## 2022-06-20 DIAGNOSIS — R11.0 NAUSEA: ICD-10-CM

## 2022-06-20 PROCEDURE — 76705 ECHO EXAM OF ABDOMEN: CPT

## 2022-06-29 ENCOUNTER — OFFICE VISIT (OUTPATIENT)
Dept: INTERNAL MEDICINE | Facility: CLINIC | Age: 67
End: 2022-06-29

## 2022-06-29 VITALS
BODY MASS INDEX: 24.87 KG/M2 | HEIGHT: 75 IN | SYSTOLIC BLOOD PRESSURE: 110 MMHG | TEMPERATURE: 97.1 F | OXYGEN SATURATION: 98 % | RESPIRATION RATE: 12 BRPM | DIASTOLIC BLOOD PRESSURE: 78 MMHG | HEART RATE: 64 BPM | WEIGHT: 200 LBS

## 2022-06-29 DIAGNOSIS — Z23 ENCOUNTER FOR IMMUNIZATION: ICD-10-CM

## 2022-06-29 DIAGNOSIS — E78.2 MIXED HYPERLIPIDEMIA: ICD-10-CM

## 2022-06-29 DIAGNOSIS — Z87.19 HISTORY OF PANCREATITIS: ICD-10-CM

## 2022-06-29 DIAGNOSIS — N52.9 ERECTILE DYSFUNCTION, UNSPECIFIED ERECTILE DYSFUNCTION TYPE: ICD-10-CM

## 2022-06-29 DIAGNOSIS — N48.6 PEYRONIE DISEASE: ICD-10-CM

## 2022-06-29 DIAGNOSIS — F41.1 GAD (GENERALIZED ANXIETY DISORDER): ICD-10-CM

## 2022-06-29 DIAGNOSIS — Z00.00 HEALTHCARE MAINTENANCE: Primary | ICD-10-CM

## 2022-06-29 DIAGNOSIS — G47.33 OBSTRUCTIVE SLEEP APNEA, ADULT: ICD-10-CM

## 2022-06-29 DIAGNOSIS — K63.5 POLYP OF COLON, UNSPECIFIED PART OF COLON, UNSPECIFIED TYPE: ICD-10-CM

## 2022-06-29 DIAGNOSIS — R74.8: ICD-10-CM

## 2022-06-29 PROCEDURE — 1126F AMNT PAIN NOTED NONE PRSNT: CPT | Performed by: INTERNAL MEDICINE

## 2022-06-29 PROCEDURE — G0009 ADMIN PNEUMOCOCCAL VACCINE: HCPCS | Performed by: INTERNAL MEDICINE

## 2022-06-29 PROCEDURE — 1159F MED LIST DOCD IN RCRD: CPT | Performed by: INTERNAL MEDICINE

## 2022-06-29 PROCEDURE — 1170F FXNL STATUS ASSESSED: CPT | Performed by: INTERNAL MEDICINE

## 2022-06-29 PROCEDURE — G0438 PPPS, INITIAL VISIT: HCPCS | Performed by: INTERNAL MEDICINE

## 2022-06-29 PROCEDURE — 99213 OFFICE O/P EST LOW 20 MIN: CPT | Performed by: INTERNAL MEDICINE

## 2022-06-29 PROCEDURE — 90677 PCV20 VACCINE IM: CPT | Performed by: INTERNAL MEDICINE

## 2022-06-29 PROCEDURE — 96160 PT-FOCUSED HLTH RISK ASSMT: CPT | Performed by: INTERNAL MEDICINE

## 2022-06-29 PROCEDURE — 99397 PER PM REEVAL EST PAT 65+ YR: CPT | Performed by: INTERNAL MEDICINE

## 2022-06-29 NOTE — PROGRESS NOTES
"Annual Exam (Review of medical issues ) and Hyperlipidemia      HPI  Uriel Garcia is a 66 y.o. male RTC In yearly CPE, review of medical issues:  1.  Hx of one week of variable GI sx - noted at last visit. Really thinks day of event had too much to drink and was really dehydrated after round of golf. Has had 3 events of ETOH intake since event and 'no effect at all'.  All three times had 1-3 drinks each time.  Bowels are normal now. No abd pain at this time. Feeling well today.   2. KOKI - has been doing well on SSRI.  Did do some talk therapy for a period of time and 'that went really well'. Felt like was able to deal with some anxiety in 'certain situations'. Is no longer seeing talk therapist. Feels like has been genuinely happy at times on the meds' and does not want to stop at this time. Saw Dr. Hannah.   3. Oral HSV, R lower lip - used topical axyxloivr. \"It went away'.  No recurrence.    4. HLD -  on statin. No issues with med.  5. JUAN - controlled on CPAP nightly, compliant. \"Every night, yes'.   Saw sleep med in 5/2022.   6. Colon polyps - x  6 on 12/2019 C-scope (1TA and 1 SSA) 11/2019 with Dr. Skaggs --> repeat 3 years and is not on the books yet.  Has on calendar to schedule.   7. ED - Viagra PRN.  Still given some benefit.  Used 100mg and was 'too much'. Used once and noted 'bend in penis'.  Still there in erect only state.  Not painful but was initially.  Not getting worse.  Curve was upward. Back to 50mg daily.  8. Hx of fracture in R leg, bone bruise L heel, R elbow locking, and R shoulder surgery; jw'd at [facility] and worked with PT and bursal removal and frozen shoulder manipulation on R with ortho/ cortisone shot in R elbow; diffuse joint/ muscle pain with aggressive tennis playing -  using less Ibuprofen after stomach issue event a few weeks ago.  Is now down to 2 pills 2-3x/ week prior to tennis.  Using turmeric tea daily prior to tennis or golf and finds it really helpful.      Review of " Systems   Constitutional: Negative for chills, fever, weight gain and weight loss.   HENT: Negative for congestion, hearing loss, odynophagia and sore throat.    Eyes: Negative for discharge, double vision, pain and redness.        Last eye exam ~1/2022; wears glasses     Cardiovascular: Negative for chest pain, dyspnea on exertion, irregular heartbeat, leg swelling, near-syncope, palpitations and syncope.   Respiratory: Negative for cough, shortness of breath, sleep disturbances due to breathing (on CPAP) and snoring.    Endocrine: Negative for polydipsia, polyphagia and polyuria.   Hematologic/Lymphatic: Negative for bleeding problem. Does not bruise/bleed easily.   Skin: Negative for rash and suspicious lesions.   Musculoskeletal: Negative for joint pain, joint swelling, muscle cramps, muscle weakness and myalgias.   Gastrointestinal: Negative for constipation, diarrhea, dysphagia, heartburn, nausea and vomiting.   Genitourinary: Negative for dysuria, frequency, hematuria, hesitancy and incomplete emptying.   Neurological: Positive for dizziness (ocasionally, on tennis court, thinks is diet related). Negative for headaches and light-headedness.   Psychiatric/Behavioral: Negative for depression. The patient does not have insomnia and is not nervous/anxious.    Allergic/Immunologic: Negative for environmental allergies and persistent infections.       Problem List:    Patient Active Problem List   Diagnosis   • Hyperlipidemia   • Erectile dysfunction   • Obstructive sleep apnea, adult   • Colon polyp   • KOKI (generalized anxiety disorder)       Medical History:    Past Medical History:   Diagnosis Date   • Anxiety    • Arthritis    • Benign colonic polyp    • Bone bruise 2018    Left heel   • BPPV (benign paroxysmal positional vertigo) 10/20/2016   • Frozen shoulder syndrome 2018    RT SHOULDER; s/p manipulation   • Hyperlipidemia     was on higher dose of Lipitor in past but had elevated LFTs. Added Zetia and now  in good control   • Sleep apnea     uses CPAP        Social History:    Social History     Socioeconomic History   • Marital status:      Spouse name: Mamta   • Number of children: 2   Tobacco Use   • Smoking status: Never Smoker   • Smokeless tobacco: Never Used   Vaping Use   • Vaping Use: Never used   Substance and Sexual Activity   • Alcohol use: Yes     Comment: social about 8-10 drinks a week; has to 'limit myself'; 2022 down to social use only   • Drug use: Yes     Types: Marijuana     Comment: very seldom, as edible   • Sexual activity: Yes     Partners: Female     Comment: wife only; no hx STD's       Family History:   Family History   Problem Relation Age of Onset   • Hypertension Father    • Nephrolithiasis Father    • Heart attack Maternal Grandmother    • Heart disease Maternal Grandmother         ASCVD   • Depression Other         siblings   • Diabetes Other    • Diabetes Sister         Type I   • Alcohol abuse Brother    • Diabetes Brother         Type II   • Hodgkin's lymphoma Maternal Grandfather    • Dementia Mother         Lewy Body   • Parkinsonism Mother    • No Known Problems Son    • No Known Problems Son    • Malig Hyperthermia Neg Hx        Surgical History:   Past Surgical History:   Procedure Laterality Date   • COLONOSCOPY  2014   • COLONOSCOPY N/A 11/7/2019    Procedure: COLONOSCOPY  to cecum and TI  : cold biopsy polypectomies,;  Surgeon: Sage Skaggs MD;  Location: [facility];  Service: Gastroenterology   • HERNIA REPAIR Right 2007    Inguinal   • JOINT MANIPULATION Right 9/14/2017    Procedure: RIGHT SHOULDER MANIPULATION;  Surgeon: Jann Barger MD;  Location: [facility] [place];  Service:    • SHOULDER ARTHROSCOPY Right 9/14/2017    Procedure: ARTHROSCOPY LYSIS OF ADHESIONS AND BURSECTOMY RIGHT SHOULDER;  Surgeon: Jann Barger MD;  Location: [facility] OR [facility] [place];  Service:          Current Outpatient Medications:   •  atorvastatin (LIPITOR) 10 MG tablet, Take 1 tablet by  mouth Daily., Disp: 90 tablet, Rfl: 3  •  sertraline (Zoloft) 50 MG tablet, one full tablet PO daily, Disp: 30 tablet, Rfl: 5  •  ibuprofen (ADVIL,MOTRIN) 200 MG tablet, Takes 2 tabs PO prior to tennis game, Disp: , Rfl:   •  sildenafil (Viagra) 100 MG tablet, Take 1 tablet by mouth Daily As Needed for Erectile Dysfunction., Disp: 24 tablet, Rfl: 2    Vitals:    06/29/22 0851   BP: 110/78   Pulse: 64   Resp: 12   Temp: 97.1 °F (36.2 °C)   SpO2: 98%     Body mass index is 25 kg/m².    Physical Exam    Assessment/ Plan  Diagnoses and all orders for this visit:    Healthcare maintenance    KOKI (generalized anxiety disorder)    Polyp of colon, unspecified part of colon, unspecified type    Obstructive sleep apnea, adult    Erectile dysfunction, unspecified erectile dysfunction type    Mixed hyperlipidemia        No follow-ups on file.      Discussion:

## 2022-06-29 NOTE — PROGRESS NOTES
"Subjective     Chief Complaint   Patient presents with   • Annual Exam     Review of medical issues    • Hyperlipidemia       HPI:  Uriel Garcia is a 66 y.o. male RTC In yearly CPE/ AWV, review of medical issues:  1.  Hx of one week of variable GI sx - noted at last visit. Really thinks day of event had too much to drink and was really dehydrated after round of golf. Has had 3 events of ETOH intake since event and 'no effect at all'.  All three times had 1-3 drinks each time.  Bowels are normal now. No abd pain at this time. Feeling well today.   2. KOKI - has been doing well on SSRI.  Did do some talk therapy for a period of time and 'that went really well'. Felt like was able to deal with some anxiety in 'certain situations'. Is no longer seeing talk therapist. Feels like has been genuinely happy at times on the meds' and does not want to stop at this time. Saw Dr. Hannah.   3. Oral HSV, R lower lip - used topical axyxloivr. \"It went away'.  No recurrence.    4. HLD -  on statin. No issues with med.  5. JUAN - controlled on CPAP nightly, compliant. \"Every night, yes'.   Saw sleep med in 5/2022.   6. Colon polyps - x  6 on 12/2019 C-scope (1TA and 1 SSA) 11/2019 with Dr. Skaggs --> repeat 3 years and is not on the books yet.  Has on calendar to schedule.   7. ED - Viagra PRN.  Still given some benefit.  Used 100mg and was 'too much'. Used once and noted 'bend in penis'.  Still there in erect only state.  Not painful but was initially.  Not getting worse.  Curve was upward. Back to 50mg daily due to this.  Curve has not changed at all. Wonders if will get worse.   8. Hx of fracture in R leg, bone bruise L heel, R elbow locking, and R shoulder surgery; jw'chauncey at [facility] and worked with PT and bursal removal and frozen shoulder manipulation on R with ortho/ cortisone shot in R elbow; diffuse joint/ muscle pain with aggressive tennis playing -  using less Ibuprofen after stomach issue event a few weeks ago.  Is now " down to 2 pills 2-3x/ week prior to tennis.  Using turmeric tea daily prior to tennis or golf and finds it really helpful.      Review of Systems   Constitutional: Negative for chills, fever, weight gain and weight loss.   HENT: Negative for congestion, hearing loss, odynophagia and sore throat.    Eyes: Negative for discharge, double vision, pain and redness.        Last eye exam ~1/2022; wears glasses     Cardiovascular: Negative for chest pain, dyspnea on exertion, irregular heartbeat, leg swelling, near-syncope, palpitations and syncope.   Respiratory: Negative for cough, shortness of breath, sleep disturbances due to breathing (on CPAP) and snoring.    Endocrine: Negative for polydipsia, polyphagia and polyuria.   Hematologic/Lymphatic: Negative for bleeding problem. Does not bruise/bleed easily.   Skin: Negative for rash and suspicious lesions.   Musculoskeletal: Negative for joint pain, joint swelling, muscle cramps, muscle weakness and myalgias.   Gastrointestinal: Negative for constipation, diarrhea, dysphagia, heartburn, nausea and vomiting.   Genitourinary: Negative for dysuria, frequency, hematuria, hesitancy and incomplete emptying.   Neurological: Positive for dizziness (ocasionally, on tennis court, thinks is diet related). Negative for headaches and light-headedness.   Psychiatric/Behavioral: Negative for depression. The patient does not have insomnia and is not nervous/anxious.    Allergic/Immunologic: Negative for environmental allergies and persistent infections.     The following portions of the patient's history were reviewed and updated as appropriate: allergies, current medications, past family history, past medical history, past social history, past surgical history and problem list.      Patient Care Team:  Gregor Gomez MD as PCP - General (Internal Medicine)    Recent Hospitalizations: No recent hospitalization(s).    Depression Screen:   PHQ-2/PHQ-9 Depression Screening 6/29/2022  "  Retired PHQ-9 Total Score -   Retired Total Score -   Little Interest or Pleasure in Doing Things 0-->not at all   Feeling Down, Depressed or Hopeless 0-->not at all   PHQ-9: Brief Depression Severity Measure Score 0       Functional and Cognitive Screening:  Functional & Cognitive Status 6/29/2022   Do you have difficulty preparing food and eating? No   Do you have difficulty bathing yourself, getting dressed or grooming yourself? No   Do you have difficulty using the toilet? No   Do you have difficulty moving around from place to place? No   Do you have trouble with steps or getting out of a bed or a chair? No   Current Diet Limited Junk Food   Dental Exam Up to date   Eye Exam Up to date   Exercise (times per week) 5 times per week   Current Exercises Include Tennis   Do you need help using the phone?  No   Are you deaf or do you have serious difficulty hearing?  No   Do you need help with transportation? No   Do you need help shopping? No   Do you need help preparing meals?  No   Do you need help with housework?  No   Do you need help with laundry? No   Do you need help taking your medications? No   Do you need help managing money? No   Do you ever drive or ride in a car without wearing a seat belt? No   Have you felt unusual stress, anger or loneliness in the last month? No   Who do you live with? Spouse   If you need help, do you have trouble finding someone available to you? No   Have you been bothered in the last four weeks by sexual problems? No       Does the patient have evidence of cognitive impairment? no    Does not need ASA (and currently is not on it)    Vitals:    06/29/22 0851   BP: 110/78   Pulse: 64   Resp: 12   Temp: 97.1 °F (36.2 °C)   SpO2: 98%   Weight: 90.7 kg (200 lb)   Height: 190.5 cm (75\")   PainSc: 0-No pain       Body mass index is 25 kg/m².    Visual Acuity:  No exam data present    Advanced Care Planning:  ACP discussion was held with the patient during this visit. Patient has an " advance directive (not in EMR), copy requested.    Objective   Physical Exam  Vitals reviewed.   Constitutional:       General: He is not in acute distress.     Appearance: Normal appearance. He is well-developed. He is not ill-appearing or toxic-appearing.   HENT:      Head: Normocephalic and atraumatic.      Right Ear: Hearing, tympanic membrane, ear canal and external ear normal.      Left Ear: Hearing, tympanic membrane, ear canal and external ear normal.      Nose: Nose normal.      Mouth/Throat:      Mouth: Mucous membranes are moist. No oral lesions.      Tongue: No lesions.      Pharynx: Oropharynx is clear. Uvula midline. No pharyngeal swelling, oropharyngeal exudate, posterior oropharyngeal erythema or uvula swelling.   Eyes:      General: Lids are normal. No scleral icterus.        Right eye: No discharge.         Left eye: No discharge.      Extraocular Movements: Extraocular movements intact.      Conjunctiva/sclera: Conjunctivae normal.      Pupils: Pupils are equal, round, and reactive to light.   Neck:      Thyroid: No thyroid mass or thyromegaly.      Vascular: No carotid bruit.   Cardiovascular:      Rate and Rhythm: Normal rate and regular rhythm.      Pulses:           Radial pulses are 2+ on the right side and 2+ on the left side.        Dorsalis pedis pulses are 2+ on the right side and 2+ on the left side.        Posterior tibial pulses are 2+ on the right side and 2+ on the left side.      Heart sounds: Normal heart sounds, S1 normal and S2 normal. No murmur heard.    No friction rub. No gallop.   Pulmonary:      Effort: Pulmonary effort is normal. No respiratory distress.      Breath sounds: Normal breath sounds. No wheezing, rhonchi or rales.   Chest:   Breasts:      Right: No axillary adenopathy or supraclavicular adenopathy.      Left: No axillary adenopathy or supraclavicular adenopathy.       Abdominal:      General: Bowel sounds are normal. There is no distension.      Palpations:  Abdomen is soft. There is no hepatomegaly or mass.      Tenderness: There is no abdominal tenderness. There is no guarding or rebound. Negative signs include Scott's sign.      Hernia: There is no hernia in the left inguinal area or right inguinal area.   Genitourinary:     Penis: Normal and circumcised. No erythema, tenderness, discharge or swelling.       Testes: Normal.      Prostate: Normal. Not enlarged and not tender.      Rectum: Normal. No external hemorrhoid. Normal anal tone.       Musculoskeletal:         General: No deformity. Normal range of motion.      Cervical back: Full passive range of motion without pain, normal range of motion and neck supple.      Right lower leg: No edema.      Left lower leg: No edema.   Lymphadenopathy:      Cervical: No cervical adenopathy.      Right cervical: No superficial, deep or posterior cervical adenopathy.     Left cervical: No superficial, deep or posterior cervical adenopathy.      Upper Body:      Right upper body: No supraclavicular, axillary or pectoral adenopathy.      Left upper body: No supraclavicular, axillary or pectoral adenopathy.      Lower Body: No right inguinal adenopathy. No left inguinal adenopathy.   Skin:     General: Skin is warm and dry.      Findings: No rash.   Neurological:      Mental Status: He is alert and oriented to person, place, and time.      Cranial Nerves: No cranial nerve deficit.      Sensory: No sensory deficit.      Motor: No weakness, tremor, atrophy or abnormal muscle tone.      Gait: Gait normal.      Deep Tendon Reflexes: Reflexes are normal and symmetric.      Reflex Scores:       Patellar reflexes are 2+ on the right side and 2+ on the left side.       Achilles reflexes are 2+ on the right side and 2+ on the left side.  Psychiatric:         Attention and Perception: Attention normal.         Mood and Affect: Mood normal.         Speech: Speech normal.         Behavior: Behavior normal. Behavior is cooperative.          Thought Content: Thought content normal.         Compared to one year ago, the patient feels physical health is the same.  Compared to one year ago, the patient feels mental health is the same.    Reviewed chart for potential of high risk medication in the elderly: yes  Reviewed chart for potential of harmful drug interactions in the elderly:yes    Identification of Risk Factors:  Risk factors include: Advance Directive Discussion  Alcohol Misuse  Chronic Pain   Colon Cancer Screening  Immunizations Discussed/Encouraged (specific immunizations; Tdap, Hepatitis A Vaccine/Series, Influenza, Prevnar 20 (Pneumococcal 20-valent conjugate), Shingrix and COVID19 )  Prostate Cancer Screening .    Patient Self-Management and Personalized Health Advice  The patient has been provided with information about: diet, exercise, alcohol use and mental health concerns and preventive services including:   · Annual Wellness Visit (AWV)  · Colorectal Cancer Screening, Colonoscopy  · Prostate Cancer Screening .    Discussed the patient's BMI with him. The BMI is in the acceptable range.    Assessment & Plan   Diagnoses and all orders for this visit:    1. Healthcare maintenance (Primary)  -     Pneumococcal Conjugate Vaccine 20-Valent All    2. KOKI (generalized anxiety disorder)    3. Polyp of colon, unspecified part of colon, unspecified type    4. Obstructive sleep apnea, adult    5. Erectile dysfunction, unspecified erectile dysfunction type  -     Ambulatory Referral to Urology    6. Mixed hyperlipidemia    7. Encounter for immunization   -     Pneumococcal Conjugate Vaccine 20-Valent All    8. Peyronie disease  -     Ambulatory Referral to Urology    9. Serum amylase elevation  -     Amylase  -     Hepatic Function Panel  -     Lipase    10. History of pancreatitis  -     Amylase  -     Hepatic Function Panel  -     Lipase            Diagnoses and all orders for this visit:    Healthcare maintenance  -     Pneumococcal Conjugate  Vaccine 20-Valent All    KOKI (generalized anxiety disorder)    Polyp of colon, unspecified part of colon, unspecified type    Obstructive sleep apnea, adult    Erectile dysfunction, unspecified erectile dysfunction type  -     Ambulatory Referral to Urology    Mixed hyperlipidemia    Encounter for immunization   -     Pneumococcal Conjugate Vaccine 20-Valent All    Peyronie disease  -     Ambulatory Referral to Urology    Serum amylase elevation  -     Amylase  -     Hepatic Function Panel  -     Lipase    History of pancreatitis  -     Amylase  -     Hepatic Function Panel  -     Lipase      Uriel Garcia is a 66 y.o. male RTC In yearly CPE/ AWV, review of medical issues:  1.  Hx of one week of variable GI sx noted early 6/2022; event with marked constipation and nausea/ bloating after drank 2 large ETOH drinks after dehydrating golf round. Followed by lizbeth colored stools and decline in energy - recovered fully at this time, no remnant sx.  Ddx here includes cholelithiasis vs. Cholecystitis vs. Acute pancreatitis vs. Less likely gastritis/ PUD.  Amylase elevated on initial labs suggesting mild pancreatitis event, noting US without gallstones or mass. Rest of labs unrevealing.  Will trend amylase today in lieu of additional imaging given suspicion of ETOH triggered event.  Counseled pt at length on need for ETOH reduction per sitting and avoidance in dehydrated state. Pt voices understanding.    2. KOKI - doing well on SSRI after start in early 2022 for recurrent anxiety issues.  S/P talk therapy with Dr. Hannah, now out of therapy.  C/W SSRI for now, will continue to assess. ETOH and THC intake reviewed and rec'd keeping on very occasional basis; reviewed adverse effect of these substances on mood.   3. Oral HSV, R lower lip - s/p topical axyxloivr. Resolved.   4. HLD, LDL baseline 194 off statin - LDL at goal on statin. LFT's OK.      5. JUAN - controlled on CPAP nightly, compliant. [facility] sleep med 5/2022.   6.  Colon polyps - x  6 on 12/2019 C-scope (1TA and 1 SSA) 11/2019 with Dr. Skaggs --> repeat 3 years - pt to call and schedule.   7. ED - Viagra PRN 50mg daily, effective.  --> Peyronnie's plaque - new dx and complaint today. Exam compatible with small plaque on anterior mid shaft of penis. Will refer to urology for consult and assessment. Counseled.   8. Hx of fracture in R leg, bone bruise L heel, R elbow locking, and R shoulder surgery; jw'd at [facility] and worked with PT and bursal removal and frozen shoulder manipulation on R with ortho/ cortisone shot in R elbow; diffuse joint/ muscle pain with aggressive tennis playing - using turmeric tea prior to tennis or golf and using less NSAID as result.  OK c/w same, avoiding daily use of NSAIDs as he is.   9. HM - labs d/w pt; Flu/ Tdap/ Hep A/ COVID/ Shingrix - UTD; COVID booster #2 advised; Prevnar 20- today; C-scope 11/2019 (6 polyps) --> 3 years; JENNIFER/ PSA OK today; Hep C  Ab (-) 2/2017; c/w exercise; Preventative care plan provided to pt at end of visit    Return in about 1 year (around 6/29/2023) for Annual physical, Medicare Wellness.          Current Outpatient Medications:   •  atorvastatin (LIPITOR) 10 MG tablet, Take 1 tablet by mouth Daily., Disp: 90 tablet, Rfl: 3  •  sertraline (Zoloft) 50 MG tablet, one full tablet PO daily, Disp: 30 tablet, Rfl: 5  •  ibuprofen (ADVIL,MOTRIN) 200 MG tablet, Takes 2 tabs PO prior to tennis game, Disp: , Rfl:   •  sildenafil (Viagra) 100 MG tablet, Take 1 tablet by mouth Daily As Needed for Erectile Dysfunction., Disp: 24 tablet, Rfl: 2

## 2022-06-30 DIAGNOSIS — R74.8 SERUM AMYLASE ELEVATED: Primary | ICD-10-CM

## 2022-06-30 DIAGNOSIS — Z78.9 ALCOHOL USE: ICD-10-CM

## 2022-06-30 DIAGNOSIS — R10.10 PAIN OF UPPER ABDOMEN: ICD-10-CM

## 2022-06-30 LAB
ALBUMIN SERPL-MCNC: 4.6 G/DL (ref 3.8–4.8)
ALP SERPL-CCNC: 76 IU/L (ref 44–121)
ALT SERPL-CCNC: 25 IU/L (ref 0–44)
AMYLASE SERPL-CCNC: 130 U/L (ref 31–110)
AST SERPL-CCNC: 25 IU/L (ref 0–40)
BILIRUB DIRECT SERPL-MCNC: 0.21 MG/DL (ref 0–0.4)
BILIRUB SERPL-MCNC: 0.8 MG/DL (ref 0–1.2)
CK SERPL-CCNC: 143 U/L (ref 41–331)
LIPASE SERPL-CCNC: 57 U/L (ref 13–78)
PROT SERPL-MCNC: 6.9 G/DL (ref 6–8.5)

## 2022-07-19 DIAGNOSIS — F41.1 GAD (GENERALIZED ANXIETY DISORDER): ICD-10-CM

## 2022-07-21 ENCOUNTER — HOSPITAL ENCOUNTER (OUTPATIENT)
Dept: CT IMAGING | Facility: HOSPITAL | Age: 67
Discharge: HOME | End: 2022-07-21
Admitting: INTERNAL MEDICINE

## 2022-07-21 DIAGNOSIS — Z78.9 ALCOHOL USE: ICD-10-CM

## 2022-07-21 DIAGNOSIS — R74.8 SERUM AMYLASE ELEVATED: ICD-10-CM

## 2022-07-21 DIAGNOSIS — R10.10 PAIN OF UPPER ABDOMEN: ICD-10-CM

## 2022-07-21 PROCEDURE — 74177 CT ABD & PELVIS W/CONTRAST: CPT

## 2022-07-21 PROCEDURE — 82565 ASSAY OF CREATININE: CPT

## 2022-07-21 PROCEDURE — 0 DIATRIZOATE MEGLUMINE & SODIUM PER 1 ML: Performed by: INTERNAL MEDICINE

## 2022-07-21 PROCEDURE — 25010000002 IOPAMIDOL 61 % SOLUTION: Performed by: INTERNAL MEDICINE

## 2022-07-21 RX ADMIN — DIATRIZOATE MEGLUMINE AND DIATRIZOATE SODIUM 30 ML: 660; 100 LIQUID ORAL; RECTAL at 14:35

## 2022-07-21 RX ADMIN — IOPAMIDOL 100 ML: 612 INJECTION, SOLUTION INTRAVENOUS at 15:32

## 2022-07-22 ENCOUNTER — TELEPHONE (OUTPATIENT)
Dept: INTERNAL MEDICINE | Facility: CLINIC | Age: 67
End: 2022-07-22

## 2022-07-22 LAB — CREAT BLDA-MCNC: 1 MG/DL (ref 0.6–1.3)

## 2022-07-22 NOTE — TELEPHONE ENCOUNTER
Caller: Uriel Garcia    Relationship to patient: Self    Best call back number: [phone]      Date of exposure: UNKOWN    Date of positive COVID19 test: 07/21/22        COVID19 symptoms: HEAD CONGESTION, COUGH, HEADACHE AND FEVER.        Additional information or concerns: PATIENT IS CALLING IN HE HAS COVID NOT FEELING WELL ASKING ABOUT GETTING MEDICATION FOR COVID. POSSIBLY ANTI VIRAL OR THE PAXLOVID.    What is the patients preferred pharmacy:   [id number] [address] AT [facility] [address] - [phone] [facility] [phone]   [phone]

## 2022-07-26 ENCOUNTER — TELEPHONE (OUTPATIENT)
Dept: INTERNAL MEDICINE | Facility: CLINIC | Age: 67
End: 2022-07-26

## 2022-07-26 NOTE — TELEPHONE ENCOUNTER
----- Message from Dennies Garrick, MA sent at 7/26/2022 11:40 AM EDT -----  Regarding: FW: Continuing symptoms     ----- Message -----  From: Uriel Garcia  Sent: 7/25/2022   2:20 PM EDT  To: [facility]  Subject: Continuing symptoms                              Dr Gomez,  I have read the results of my CT scan and assume that it is good news. I am anxious and hopeful for your confirmation. As you consider next steps I want to inform you of what’s going on with regard to my health.    Covid finally got me and I am recovering slowly as of this writing.     My night sweats have continued most nights since my last visit in your office.  Most nights I have light damp skin and damp sheets. Other nights (usually in the early morning hours just before I wake up) I sweat enough to wipe moisture from my face. I’m not sure if the heavy nights have been because of fever from Covid.     I have had zero alcohol since my last visit.    My abdomen seems less swollen recently but my stomach/intestines seem to churn and make lots of noise after I eat. It ulices makes me feel like something still isn’t as is should be with my digestive system.     I hope you had a wonderful break and as always - thanks for being my Doctor.    Uriel Garcia

## 2022-07-28 ENCOUNTER — TELEPHONE (OUTPATIENT)
Dept: INTERNAL MEDICINE | Facility: CLINIC | Age: 67
End: 2022-07-28

## 2022-07-28 DIAGNOSIS — R74.8 SERUM AMYLASE ELEVATED: Primary | ICD-10-CM

## 2022-07-28 NOTE — TELEPHONE ENCOUNTER
Caller: Uriel Garcia    Relationship: Self    Best call back number: [phone]    What orders are you requesting (i.e. lab or imaging): ORDERS    In what timeframe would the patient need to come in: TOMORROW (LEAVES SATURDAY TO GO OUT OF TOWN FOR 2 WEEKS)     Where will you receive your lab/imaging services: IN OFFICE     Additional notes: PT STATED THAT HE SPOKE WITH DR. FIGUEROA ABOUT CT RESULTS SINCE PCP IS OUT OF OFFICE. PT STATED THAT PCP'S NEXT STEPS WERE TO DO LABS (TO CHECK IF AMYLASE IS STILL HIGH)  IF THE CT SCAN WAS CLEAR. PT IS REQUESTING THAT DR. FIGUEROA PLACE THE ORDER AS HE IS LEAVING TO GO OUT OF TOWN FOR 2 WEEKS.

## 2022-07-30 LAB
AMYLASE SERPL-CCNC: 140 U/L (ref 31–110)
CK SERPL-CCNC: 50 U/L (ref 41–331)

## 2022-08-15 DIAGNOSIS — R74.8 SERUM AMYLASE ELEVATED: Primary | ICD-10-CM

## 2022-08-15 DIAGNOSIS — E78.2 MIXED HYPERLIPIDEMIA: ICD-10-CM

## 2022-08-15 DIAGNOSIS — Z78.9 ALCOHOL USE: ICD-10-CM

## 2022-08-16 LAB
ALBUMIN SERPL ELPH-MCNC: 4.1 G/DL (ref 2.9–4.4)
ALBUMIN/GLOB SERPL: 1.5 {RATIO} (ref 0.7–1.7)
ALPHA1 GLOB SERPL ELPH-MCNC: 0.2 G/DL (ref 0–0.4)
ALPHA2 GLOB SERPL ELPH-MCNC: 0.6 G/DL (ref 0.4–1)
AMYLASE SERPL-CCNC: 124 U/L (ref 31–110)
ANA SER QL: NEGATIVE
B-GLOBULIN SERPL ELPH-MCNC: 1 G/DL (ref 0.7–1.3)
BASOPHILS # BLD AUTO: 0 X10E3/UL (ref 0–0.2)
BASOPHILS NFR BLD AUTO: 0 %
CRP SERPL-MCNC: <1 MG/L (ref 0–10)
ENDOMYSIUM IGA SER QL: NEGATIVE
EOSINOPHIL # BLD AUTO: 0.1 X10E3/UL (ref 0–0.4)
EOSINOPHIL NFR BLD AUTO: 2 %
ERYTHROCYTE [DISTWIDTH] IN BLOOD BY AUTOMATED COUNT: 13 % (ref 11.6–15.4)
ERYTHROCYTE [SEDIMENTATION RATE] IN BLOOD BY WESTERGREN METHOD: 4 MM/HR (ref 0–30)
GAMMA GLOB SERPL ELPH-MCNC: 1.1 G/DL (ref 0.4–1.8)
GLIADIN PEPTIDE IGA SER-ACNC: 4 UNITS (ref 0–19)
GLIADIN PEPTIDE IGG SER-ACNC: 1 UNITS (ref 0–19)
GLOBULIN SER CALC-MCNC: 2.8 G/DL (ref 2.2–3.9)
HCT VFR BLD AUTO: 44.2 % (ref 37.5–51)
HGB BLD-MCNC: 14.9 G/DL (ref 13–17.7)
HIV 1+2 AB+HIV1 P24 AG SERPL QL IA: NON REACTIVE
IGA SERPL-MCNC: 161 MG/DL (ref 61–437)
IMM GRANULOCYTES # BLD AUTO: 0 X10E3/UL (ref 0–0.1)
IMM GRANULOCYTES NFR BLD AUTO: 1 %
LABORATORY COMMENT REPORT: NORMAL
LYMPHOCYTES # BLD AUTO: 1.5 X10E3/UL (ref 0.7–3.1)
LYMPHOCYTES NFR BLD AUTO: 25 %
M PROTEIN SERPL ELPH-MCNC: NORMAL G/DL
MCH RBC QN AUTO: 29 PG (ref 26.6–33)
MCHC RBC AUTO-ENTMCNC: 33.7 G/DL (ref 31.5–35.7)
MCV RBC AUTO: 86 FL (ref 79–97)
MONOCYTES # BLD AUTO: 0.6 X10E3/UL (ref 0.1–0.9)
MONOCYTES NFR BLD AUTO: 10 %
NEUTROPHILS # BLD AUTO: 3.8 X10E3/UL (ref 1.4–7)
NEUTROPHILS NFR BLD AUTO: 62 %
PLATELET # BLD AUTO: 180 X10E3/UL (ref 150–450)
PROT PATTERN SERPL ELPH-IMP: NORMAL
PROT SERPL-MCNC: 6.9 G/DL (ref 6–8.5)
RBC # BLD AUTO: 5.14 X10E6/UL (ref 4.14–5.8)
RHEUMATOID FACT SERPL-ACNC: <10 IU/ML
TTG IGA SER-ACNC: <2 U/ML (ref 0–3)
TTG IGG SER-ACNC: <2 U/ML (ref 0–5)
WBC # BLD AUTO: 6 X10E3/UL (ref 3.4–10.8)

## 2022-08-25 ENCOUNTER — TELEPHONE (OUTPATIENT)
Dept: GASTROENTEROLOGY | Facility: CLINIC | Age: 67
End: 2022-08-25

## 2022-09-23 ENCOUNTER — PRE-PROCEDURE SCREENING (OUTPATIENT)
Dept: GASTROENTEROLOGY | Facility: CLINIC | Age: 67
End: 2022-09-23

## 2022-10-27 ENCOUNTER — PREP FOR SURGERY (OUTPATIENT)
Dept: OTHER | Facility: HOSPITAL | Age: 67
End: 2022-10-27

## 2022-10-27 DIAGNOSIS — K63.5 POLYP OF COLON, UNSPECIFIED PART OF COLON, UNSPECIFIED TYPE: Primary | ICD-10-CM

## 2023-01-25 DIAGNOSIS — E78.2 MIXED HYPERLIPIDEMIA: ICD-10-CM

## 2023-01-25 RX ORDER — ATORVASTATIN CALCIUM 10 MG/1
TABLET, FILM COATED ORAL
Qty: 90 TABLET | Refills: 3 | Status: SHIPPED | OUTPATIENT
Start: 2023-01-25

## 2023-01-28 DIAGNOSIS — F41.1 GAD (GENERALIZED ANXIETY DISORDER): ICD-10-CM

## 2023-03-07 ENCOUNTER — ANESTHESIA EVENT (OUTPATIENT)
Dept: GASTROENTEROLOGY | Facility: HOSPITAL | Age: 68
End: 2023-03-07
Payer: MEDICARE

## 2023-03-07 ENCOUNTER — HOSPITAL ENCOUNTER (OUTPATIENT)
Facility: HOSPITAL | Age: 68
Setting detail: HOSPITAL OUTPATIENT SURGERY
Discharge: HOME | End: 2023-03-07
Attending: INTERNAL MEDICINE | Admitting: INTERNAL MEDICINE
Payer: MEDICARE

## 2023-03-07 ENCOUNTER — ANESTHESIA (OUTPATIENT)
Dept: GASTROENTEROLOGY | Facility: HOSPITAL | Age: 68
End: 2023-03-07
Payer: MEDICARE

## 2023-03-07 VITALS
HEART RATE: 55 BPM | HEIGHT: 74 IN | OXYGEN SATURATION: 97 % | BODY MASS INDEX: 26.01 KG/M2 | WEIGHT: 202.7 LBS | RESPIRATION RATE: 16 BRPM | DIASTOLIC BLOOD PRESSURE: 87 MMHG | SYSTOLIC BLOOD PRESSURE: 124 MMHG

## 2023-03-07 DIAGNOSIS — K63.5 POLYP OF COLON, UNSPECIFIED PART OF COLON, UNSPECIFIED TYPE: ICD-10-CM

## 2023-03-07 PROCEDURE — 45380 COLONOSCOPY AND BIOPSY: CPT | Performed by: INTERNAL MEDICINE

## 2023-03-07 PROCEDURE — 88305 TISSUE EXAM BY PATHOLOGIST: CPT | Performed by: INTERNAL MEDICINE

## 2023-03-07 PROCEDURE — 25010000002 PROPOFOL 10 MG/ML EMULSION: Performed by: ANESTHESIOLOGY

## 2023-03-07 RX ORDER — SODIUM CHLORIDE 0.9 % (FLUSH) 0.9 %
10 SYRINGE (ML) INJECTION AS NEEDED
Status: DISCONTINUED | OUTPATIENT
Start: 2023-03-07 | End: 2023-03-07 | Stop reason: HOSPADM

## 2023-03-07 RX ORDER — ONDANSETRON 2 MG/ML
4 INJECTION INTRAMUSCULAR; INTRAVENOUS ONCE AS NEEDED
Status: DISCONTINUED | OUTPATIENT
Start: 2023-03-07 | End: 2023-03-07 | Stop reason: HOSPADM

## 2023-03-07 RX ORDER — PROPOFOL 10 MG/ML
VIAL (ML) INTRAVENOUS CONTINUOUS PRN
Status: DISCONTINUED | OUTPATIENT
Start: 2023-03-07 | End: 2023-03-07 | Stop reason: SURG

## 2023-03-07 RX ORDER — SODIUM CHLORIDE, SODIUM LACTATE, POTASSIUM CHLORIDE, CALCIUM CHLORIDE 600; 310; 30; 20 MG/100ML; MG/100ML; MG/100ML; MG/100ML
1000 INJECTION, SOLUTION INTRAVENOUS CONTINUOUS
Status: DISCONTINUED | OUTPATIENT
Start: 2023-03-07 | End: 2023-03-07 | Stop reason: HOSPADM

## 2023-03-07 RX ORDER — PROPOFOL 10 MG/ML
VIAL (ML) INTRAVENOUS AS NEEDED
Status: DISCONTINUED | OUTPATIENT
Start: 2023-03-07 | End: 2023-03-07 | Stop reason: SURG

## 2023-03-07 RX ORDER — LIDOCAINE HYDROCHLORIDE 20 MG/ML
INJECTION, SOLUTION INFILTRATION; PERINEURAL AS NEEDED
Status: DISCONTINUED | OUTPATIENT
Start: 2023-03-07 | End: 2023-03-07 | Stop reason: SURG

## 2023-03-07 RX ADMIN — PROPOFOL 120 MG: 10 INJECTION, EMULSION INTRAVENOUS at 11:10

## 2023-03-07 RX ADMIN — LIDOCAINE HYDROCHLORIDE 50 MG: 20 INJECTION, SOLUTION INFILTRATION; PERINEURAL at 11:10

## 2023-03-07 RX ADMIN — SODIUM CHLORIDE, POTASSIUM CHLORIDE, SODIUM LACTATE AND CALCIUM CHLORIDE 1000 ML: 600; 310; 30; 20 INJECTION, SOLUTION INTRAVENOUS at 10:36

## 2023-03-07 RX ADMIN — Medication 200 MCG/KG/MIN: at 11:10

## 2023-03-07 NOTE — ANESTHESIA POSTPROCEDURE EVALUATION
"Patient: Uriel Garcia    Procedure Summary     Date: 03/07/23 Room / Location: [facility] ENDOSCOPY 5 / [facility] ENDOSCOPY    Anesthesia Start: 1104 Anesthesia Stop: 1132    Procedure: COLONOSCOPY to cecum and TI with cold polypectomy Diagnosis:       Polyp of colon, unspecified part of colon, unspecified type      (Polyp of colon, unspecified part of colon, unspecified type [K63.5])    Surgeons: Sage Skaggs MD Provider: Clementine Johnson MD    Anesthesia Type: MAC ASA Status: 2          Anesthesia Type: MAC    Vitals  Vitals Value Taken Time   /87 03/07/23 1154   Temp     Pulse 55 03/07/23 1154   Resp 16 03/07/23 1154   SpO2 97 % 03/07/23 1154           Post Anesthesia Care and Evaluation    Patient location during evaluation: PHASE II  Patient participation: complete - patient participated  Level of consciousness: awake  Pain management: adequate    Airway patency: patent  Anesthetic complications: No anesthetic complications    Cardiovascular status: acceptable  Respiratory status: acceptable  Hydration status: acceptable    Comments: /87 (BP Location: Left arm, Patient Position: Lying)   Pulse 55   Resp 16   Ht 188 cm (74\")   Wt 91.9 kg (202 lb 11.2 oz)   SpO2 97%   BMI 26.03 kg/m²       "

## 2023-03-07 NOTE — DISCHARGE INSTRUCTIONS
For the next 24 hours patient needs to be with a responsible adult.    For 24 hours DO NOT drive, operate machinery, appliances, drink alcohol, make important decisions or sign legal documents.    Start with a light or bland diet if you are feeling sick to your stomach otherwise advance to regular diet as tolerated.    Follow recommendations on procedure report if provided by your doctor.    Call Dr Skaggs for problems [phone]. Await pathology result in 7-10 days.    Problems may include but not limited to: large amounts of bleeding, trouble breathing, repeated vomiting, severe unrelieved pain, fever or chills.

## 2023-03-07 NOTE — ANESTHESIA PREPROCEDURE EVALUATION
Anesthesia Evaluation     Patient summary reviewed and Nursing notes reviewed                Airway   Mallampati: I  Dental - normal exam     Pulmonary - normal exam   (+) sleep apnea on CPAP,   (-) not a smoker  Cardiovascular - normal exam    ECG reviewed    (+) hyperlipidemia,       Neuro/Psych  (+) psychiatric history Anxiety,    GI/Hepatic/Renal/Endo      Musculoskeletal     Abdominal    Substance History      OB/GYN          Other   arthritis,                    Anesthesia Plan    ASA 2     MAC       Anesthetic plan, risks, benefits, and alternatives have been provided, discussed and informed consent has been obtained with: patient.        CODE STATUS:

## 2023-03-07 NOTE — H&P
[facility]  Pre Procedure History & Physical    Chief Complaint:   Time for my colonoscopy    Subjective     HPI:   67 y.o. male who has a history of colon polyps.  His last colonoscopy was in 11 of 2019.    Past Medical History:   Past Medical History:   Diagnosis Date   • Anxiety    • Arthritis    • Benign colonic polyp    • Bone bruise 2018    Left heel   • BPPV (benign paroxysmal positional vertigo) 10/20/2016   • Frozen shoulder syndrome 2018    RT SHOULDER; s/p manipulation   • Hyperlipidemia     was on higher dose of Lipitor in past but had elevated LFTs. Added Zetia and now in good control   • Sleep apnea     uses CPAP       Family History:  Family History   Problem Relation Age of Onset   • Hypertension Father    • Nephrolithiasis Father    • Heart attack Maternal Grandmother    • Heart disease Maternal Grandmother         ASCVD   • Depression Other         siblings   • Diabetes Other    • Diabetes Sister         Type I   • Alcohol abuse Brother    • Diabetes Brother         Type II   • Hodgkin's lymphoma Maternal Grandfather    • Dementia Mother         Lewy Body   • Parkinsonism Mother    • No Known Problems Son    • No Known Problems Son    • Malig Hyperthermia Neg Hx        Social History:   reports that he has never smoked. He has never used smokeless tobacco. He reports current alcohol use. He reports current drug use. Drug: Marijuana.    Medications:   Medications Prior to Admission   Medication Sig Dispense Refill Last Dose   • atorvastatin (LIPITOR) 10 MG tablet TAKE 1 TABLET EVERY DAY 90 tablet 3    • benzonatate (TESSALON) 100 MG capsule Take 1-2 capsules by mouth 3 (Three) Times a Day As Needed for Cough. 30 capsule 0    • sertraline (ZOLOFT) 50 MG tablet TAKE 1/2 TABLET BY MOUTH DAILY FOR 2 WEEKS. INCREASE TO 1 TABLET BY MOUTH DAILY 30 tablet 5    • sildenafil (Viagra) 100 MG tablet Take 1 tablet by mouth Daily As Needed for Erectile Dysfunction. 24 tablet 2    •  "ibuprofen (ADVIL,MOTRIN) 200 MG tablet Takes 2 tabs PO prior to tennis game          Allergies:  Patient has no known allergies.    ROS:    Pertinent items are noted in HPI     Objective     Blood pressure 124/84, pulse 61, resp. rate 11, height 188 cm (74\"), weight 91.9 kg (202 lb 11.2 oz), SpO2 98 %.    Physical Exam   Constitutional: Pt is oriented to person, place, and time and well-developed, well-nourished, and in no distress.   HENT:   Mouth/Throat: Oropharynx is clear and moist.   Neck: Normal range of motion. Neck supple.   Cardiovascular: Normal rate, regular rhythm and normal heart sounds.    Pulmonary/Chest: Effort normal and breath sounds normal. No respiratory distress. No  wheezes.   Abdominal: Soft. Bowel sounds are normal.   Skin: Skin is warm and dry.   Psychiatric: Mood, memory, affect and judgment normal.     Assessment & Plan     Diagnosis:  67 y.o. male who has a history of colon polyps.  His last colonoscopy was in 11 of 2019.    Anticipated Surgical Procedure:  Colonoscopy    The risks, benefits, and alternatives of this procedure have been discussed with the patient or the responsible party- the patient understands and agrees to proceed.    Sage Skaggs M.D.  "

## 2023-03-08 LAB
LAB AP CASE REPORT: NORMAL
PATH REPORT.FINAL DX SPEC: NORMAL
PATH REPORT.GROSS SPEC: NORMAL

## 2023-03-14 NOTE — PROGRESS NOTES
03/14/23       Tell him that the colon polyps that were removed during his recent colonoscopy was not cancerous but was precancerous.  I would recommend that he have a repeat colonoscopy in 5 years.  Please send copy of this report to his PCP.  Rian lynch

## 2023-03-28 ENCOUNTER — TELEPHONE (OUTPATIENT)
Dept: GASTROENTEROLOGY | Facility: CLINIC | Age: 68
End: 2023-03-28
Payer: MEDICARE

## 2023-03-28 NOTE — TELEPHONE ENCOUNTER
Called pt and left  for pt to call back.     C/s placed in [facility] and  for 03/07/2028.    Results sent to Dr Gomez thru [facility].

## 2023-05-12 ENCOUNTER — OFFICE VISIT (OUTPATIENT)
Dept: SLEEP MEDICINE | Facility: HOSPITAL | Age: 68
End: 2023-05-12
Payer: MEDICARE

## 2023-05-12 VITALS
SYSTOLIC BLOOD PRESSURE: 118 MMHG | DIASTOLIC BLOOD PRESSURE: 77 MMHG | BODY MASS INDEX: 26.05 KG/M2 | OXYGEN SATURATION: 98 % | HEIGHT: 74 IN | HEART RATE: 61 BPM | WEIGHT: 203 LBS

## 2023-05-12 DIAGNOSIS — G47.33 OBSTRUCTIVE SLEEP APNEA: Primary | ICD-10-CM

## 2023-05-12 PROCEDURE — G0463 HOSPITAL OUTPT CLINIC VISIT: HCPCS

## 2023-05-12 NOTE — PROGRESS NOTES
"[facility]  Telephone: [phone] / Fax: [phone] [place]  Telephone: [phone] / Fax: [phone] [place]    PCP: Gregor Gomez MD    Reason for visit: JUAN f/u    Uriel Garcia is a 67 y.o.male  was last seen at [facility] sleep lab in May 2022. He is doing great on CPAP set at 5-15cm H2O. He is unaware of snoring or gasping respirations while on the machine. He uses nasal pillow mask but does not replace them often-perhaps only once every 6 months. Sleep quality has improved on the CPAP and excessive sleepiness/snoring is no longer an issue.  There is no complaint of aerophagia, excessive dry mouth or air leak.    SH- no tobacco, 6-8 alc per week, 1 soda per day, no energy drinks.    ROS- +anxiety, +depression, rest is negative.    MANSOOR Simons's    Current Medications:    Current Outpatient Medications:   •  atorvastatin (LIPITOR) 10 MG tablet, TAKE 1 TABLET EVERY DAY, Disp: 90 tablet, Rfl: 3  •  benzonatate (TESSALON) 100 MG capsule, Take 1-2 capsules by mouth 3 (Three) Times a Day As Needed for Cough., Disp: 30 capsule, Rfl: 0  •  ibuprofen (ADVIL,MOTRIN) 200 MG tablet, Takes 2 tabs PO prior to tennis game, Disp: , Rfl:   •  sertraline (ZOLOFT) 50 MG tablet, TAKE 1/2 TABLET BY MOUTH DAILY FOR 2 WEEKS. INCREASE TO 1 TABLET BY MOUTH DAILY, Disp: 30 tablet, Rfl: 5  •  sildenafil (Viagra) 100 MG tablet, Take 1 tablet by mouth Daily As Needed for Erectile Dysfunction., Disp: 24 tablet, Rfl: 2   also entered in Sleep Questionnaire    Patient  has a past medical history of Anxiety, Arthritis, Benign colonic polyp, Bone bruise (2018), BPPV (benign paroxysmal positional vertigo) (10/20/2016), Frozen shoulder syndrome (2018), Hyperlipidemia, and Sleep apnea.    I have reviewed the Past Medical History, Past Surgical History, Social History and Family History.    Vital Signs /77   Pulse 61   Ht 188 cm (74.02\")   Wt 92.1 kg (203 lb)   SpO2 98%   BMI 26.05 kg/m²  Body mass index " is 26.05 kg/m².    General Alert and oriented. No acute distress noted   Pharynx/Throat Class IV  Mallampati airway, large tongue, no evidence of redundant lateral pharyngeal tissue. No oral lesions. No thrush. Moist mucous membranes.   Head Normocephalic. Symmetrical. Atraumatic.    Nose No septal deviation. No drainage   Chest Wall Normal shape. Symmetric expansion with respiration. No tenderness.   Neck Trachea midline, no thyromegaly or adenopathy    Lungs Clear to auscultation bilaterally. No wheezes. No rhonchi. No rales. Respirations regular, even and unlabored.   Heart Regular rhythm and normal rate. Normal S1 and S2. No murmur   Abdomen Soft, non-tender and non-distended. Normal bowel sounds. No masses.   Extremities Moves all extremities well. No edema   Psychiatric Normal mood and affect.     Testing:  Download 2/10/23=5/10/23 96% use with average nightly use of 9 hours and 38 minutes on auto CPAP 5-15cm H2O, AHI 2.1, leak of 23 seconds per minute.    Study  • 3/6/14  FINDINGS: Diagnostic study from 9:42 p.m. to 5:55 a.m. Sleep efficiency was  somewhat poor at 77%, but there was adequate sleep time of 6.33 hours for  diagnostic purposes. Sleep distribution showed 7.8% stage I sleep, 69.2% stage  II sleep, 12.8% slow-wave sleep, 10.3% REM sleep. Apnea-hypopnea index is 14.7  events an hour indicating moderately severe obstructive sleep apnea which is  almost severe by respiratory disturbance index criteria at 28 events an hour.  In the supine position, very, very severe obstructive sleep apnea is present  with AHI of 76.9. In REM sleep, AHI was 10.8 with RDI of 17. Oxygen saturation  fell below 90% for 6% of total sleep time indicating significant sleep-related  hypoxemia. Arousal index high at 41 and patient had 68.12% time snoring.     Impression:  1. Obstructive sleep apnea          Plan:  Uriel is doing great on the auto CPAP 5-15cm H2O. He uses the machine and benefits from its use in terms of  reduction of hypersomnia and snoring. I advised him to call us if he feels that pressure become excessive or insufficient so that we make the appropriate adjustments in between office visits.      Weight loss will be strongly beneficial to reduce the severity of sleep-disordered breathing.  Caution during activities that require prolonged concentration is strongly advised if sleepiness returns.     Follow up with Dr. Arambula in one year    Thank you for allowing me to participate in your patient's care.      JONH Heath  [facility]  Phone: [phone]      Part of this note may be an electronic transcription/translation of spoken language to printed text using the Dragon Dictation System.

## 2023-07-20 PROBLEM — N40.0 BENIGN PROSTATIC HYPERPLASIA WITHOUT LOWER URINARY TRACT SYMPTOMS: Status: ACTIVE | Noted: 2023-07-20

## 2023-07-20 PROBLEM — N48.6 PEYRONIE'S DISEASE: Status: ACTIVE | Noted: 2023-07-20

## 2023-07-20 PROBLEM — R73.01 IFG (IMPAIRED FASTING GLUCOSE): Status: ACTIVE | Noted: 2023-07-20

## 2023-07-20 PROBLEM — R74.8 ELEVATED AMYLASE: Status: ACTIVE | Noted: 2023-07-20

## 2023-07-31 NOTE — TELEPHONE ENCOUNTER
Caller: Uriel Garcia    Relationship to patient: Self    Best call back number: [phone]     Type of visit:  COLONOSCOPY.     Requested date: NEXT AVAILABLE     Additional notes:PATIENT CALLED IN ASKING TO SCHEDULE COLONOSCOPY.    One refill done. Due for recheck before more refills.

## 2023-08-25 DIAGNOSIS — N52.9 ERECTILE DYSFUNCTION, UNSPECIFIED ERECTILE DYSFUNCTION TYPE: ICD-10-CM

## 2023-08-28 RX ORDER — SILDENAFIL 100 MG/1
TABLET, FILM COATED ORAL
Qty: 24 TABLET | Refills: 0 | OUTPATIENT
Start: 2023-08-28

## 2023-09-12 ENCOUNTER — PATIENT MESSAGE (OUTPATIENT)
Dept: INTERNAL MEDICINE | Facility: CLINIC | Age: 68
End: 2023-09-12
Payer: MEDICARE

## 2023-09-12 NOTE — TELEPHONE ENCOUNTER
From: Uriel Garcia  To: Gregor Gomez  Sent: 9/12/2023 12:42 PM EDT  Subject: Orthotics need to be refurbished or remade    I have been wearing custom made orthotics in both shoes for 35 years. I sometimes need to have them refurbished or remade. I called [facility] who made my last pair and they asked for a doctors order requesting a remake or refurbish. The doctor who originally wrote the original order has long passed away. I have had them remade before without a Dr's order. Can Doctor Gomez write this order and fax it to [facility]. The fax number is [phone]. Thank you. If he isn't comfortable writing this - do you have any suggestions?

## 2023-10-13 DIAGNOSIS — M21.961 DEFORMITY OF BOTH FEET: Primary | ICD-10-CM

## 2023-10-13 DIAGNOSIS — M21.962 DEFORMITY OF BOTH FEET: Primary | ICD-10-CM

## 2024-03-29 DIAGNOSIS — F41.1 GAD (GENERALIZED ANXIETY DISORDER): ICD-10-CM

## 2024-05-02 DIAGNOSIS — E78.2 MIXED HYPERLIPIDEMIA: ICD-10-CM

## 2024-05-02 RX ORDER — ATORVASTATIN CALCIUM 20 MG/1
20 TABLET, FILM COATED ORAL DAILY
Qty: 90 TABLET | Refills: 3 | Status: SHIPPED | OUTPATIENT
Start: 2024-05-02

## 2024-05-14 ENCOUNTER — OFFICE VISIT (OUTPATIENT)
Dept: SLEEP MEDICINE | Facility: HOSPITAL | Age: 69
End: 2024-05-14
Payer: MEDICARE

## 2024-05-14 VITALS
WEIGHT: 210 LBS | OXYGEN SATURATION: 98 % | BODY MASS INDEX: 26.95 KG/M2 | DIASTOLIC BLOOD PRESSURE: 84 MMHG | HEART RATE: 53 BPM | SYSTOLIC BLOOD PRESSURE: 139 MMHG | HEIGHT: 74 IN

## 2024-05-14 DIAGNOSIS — G47.33 OBSTRUCTIVE SLEEP APNEA, ADULT: Primary | ICD-10-CM

## 2024-05-14 PROCEDURE — G0463 HOSPITAL OUTPT CLINIC VISIT: HCPCS

## 2024-05-14 NOTE — PROGRESS NOTES
"[facility]  [address]  [place] [address] [phone]    PCP: Gregor Gomez MD    Reason for visit:  Sleep disorders: JUAN    Uriel is a 68 y.o.male who was seen in the Sleep Disorders Center today. Annual fu. He uses nasal pillows and changes only every 6 months. He sleeps from 10pm to 9am. He wakes up rested and no EDS. No sig health changes.  Likely Sleepiness Scale is 2. Caffeine 2 per day. Alcohol 8 per week.    Uriel  reports that he has never smoked. He has never used smokeless tobacco.    Pertinent Positive Review of Systems of denies  Rest of Review of Systems was negative as recorded in Sleep Questionnaire.    Patient  has a past medical history of Anxiety, Arthritis, Benign colonic polyp, Benign prostatic hyperplasia without lower urinary tract symptoms (07/20/2023), Bone bruise (2018), BPPV (benign paroxysmal positional vertigo) (10/20/2016), Cataract (07/2022), Depression, Erectile dysfunction, Frozen shoulder syndrome (2018), Hyperlipidemia, Peyronie's disease (07/20/2023), and Sleep apnea.     Current Medications:    Current Outpatient Medications:     atorvastatin (LIPITOR) 20 MG tablet, TAKE 1 TABLET EVERY DAY, Disp: 90 tablet, Rfl: 3    ibuprofen (ADVIL,MOTRIN) 200 MG tablet, Takes 2 tabs PO prior to tennis game, Disp: , Rfl:     sertraline (ZOLOFT) 50 MG tablet, TAKE 1 TABLET BY MOUTH DAILY, Disp: 30 tablet, Rfl: 5    tadalafil (Cialis) 10 MG tablet, Take 1 tablet by mouth Daily As Needed for Erectile Dysfunction., Disp: , Rfl:    also entered in Sleep Questionnaire         Vital Signs: /84   Pulse 53   Ht 188 cm (74.02\")   Wt 95.3 kg (210 lb)   SpO2 98%   BMI 26.95 kg/m²     Body mass index is 26.95 kg/m².       Tongue: Normal       Dentition: good       Pharynx: Posterior pharyngeal pillars are narrow   Mallampatti: II (hard and soft palate, upper portion of tonsils anduvula visible)        General: Alert. Cooperative. Well " "developed. No acute distress.             Head:  Normocephalic. Symmetrical. Atraumatic.              Nose: No septal deviation. No drainage.          Throat: No oral lesions. No thrush. Moist mucous membranes.    Chest Wall:  Normal shape. Symmetric expansion with respiration. No tenderness.             Neck:  Trachea midline.           Lungs:  Clear to auscultation bilaterally. No wheezes. No rhonchi. No rales. Respirations regular, even and unlabored.            Heart:  Regular rhythm and normal rate. Normal S1 and S2. No murmur.     Abdomen:  Soft, non-tender and non-distended. Normal bowel sounds. No masses.  Extremities:  Moves all extremities well. No edema.    Psychiatric: Normal mood and affect.    Diagnostic data available to date is as below and was reviewed on current visit:  3/6/14  FINDINGS: Diagnostic study from 9:42 p.m. to 5:55 a.m. Sleep efficiency was  somewhat poor at 77%, but there was adequate sleep time of 6.33 hours for  diagnostic purposes. Sleep distribution showed 7.8% stage I sleep, 69.2% stage  II sleep, 12.8% slow-wave sleep, 10.3% REM sleep. Apnea-hypopnea index is 14.7  events an hour indicating moderately severe obstructive sleep apnea which is  almost severe by respiratory disturbance index criteria at 28 events an hour.  In the supine position, very, very severe obstructive sleep apnea is present  with AHI of 76.9. In REM sleep, AHI was 10.8 with RDI of 17. Oxygen saturation  fell below 90% for 6% of total sleep time indicating significant sleep-related  hypoxemia. Arousal index high at 41 and patient had 68.12% time snoring.     No results found for: \"IRON\", \"TIBC\", \"FERRITIN\"    Most current available usage data reviewed on 05/14/2024:        [employer]: [employer]    Prescription to [employer] for replacement supplies as below:    nasal pillow      Description Replacement    Nasal PILLOWS     x A 7034 Nasal Pillows  every 3 mth   x A 7033 Repl Nasal Pillows  2 per mth    Nasal " MASK/CUSHION      A 7034 Nasal Mask/Cushion  every 3 mth    A 7032 Repl Nasal Mask/Cushion  2 per mth    Full Face MASK      A 7030 Full Face Mask  every 3 mth    A 7031 Repl Face Mask  1 per mth      A 4604 Heated Tubing  every 3 mth    A 7037 Standard Tubing  every 3 mth   x A 7035 Headgear  every 3 mth   x A 7046 Repl Humidifier Chamber  every 6 yrs   x A 7038 Disposable Filters  2 per mth   x A 7039 Non-disposable Filter  every 6 mth   x A 7036 Chin Strap  every 6 mth     Orders Placed This Encounter   Procedures    Pulmonary Results Scan          Impression:  1. Obstructive sleep apnea, adult        Plan:  Uriel is fully compliant with his device and uses it consistently.  He is very happy with current settings and his AHI is less than 5.    I reiterated the importance of effective treatment of obstructive sleep apnea with PAP machine.  Cardiovascular health risks of untreated sleep apnea were again reviewed.  Patient was asked to remain cautious if there is persistent hypersomnolence.     Change of PAP supplies regularly is important for effective use.  Change of cushion on the mask or plugs on nasal pillows along with disposable filters once every month and change of mask frame, tubing, headgear and Velcro straps every 6 months at the minimum was reiterated.    This patient is compliant with PAP machine and benefits from its use.  Apnea hypopneas index is corrected/improved.  Daytime hypersomnolence has resolved.     Patient will follow up in this clinic in 1 year APRN    Thank you for allowing me to participate in your patient's care.    Electronically signed by Chuy Arambula MD, 05/14/24, 10:35 AM EDT.    Part of this note may be an electronic transcription/translation of spoken language to printed text using the Dragon Dictation System.

## 2024-09-12 ENCOUNTER — PATIENT MESSAGE (OUTPATIENT)
Dept: INTERNAL MEDICINE | Facility: CLINIC | Age: 69
End: 2024-09-12
Payer: MEDICARE

## 2024-09-12 NOTE — TELEPHONE ENCOUNTER
From: Uriel Garcia  To: Gregor Gomez  Sent: 9/12/2024 12:31 PM EDT  Subject: Knee pain    Just an FYI   I have an appt on Sept. 19th to see nurse practitioner Alex Frankel (with Dr Shantanu Brooks)due to signifigant knee pain following activity. If I need to see you first or you have a recommendation please let me know.

## 2024-09-19 ENCOUNTER — OFFICE VISIT (OUTPATIENT)
Dept: ORTHOPEDIC SURGERY | Facility: CLINIC | Age: 69
End: 2024-09-19
Payer: MEDICARE

## 2024-09-19 VITALS — BODY MASS INDEX: 27.49 KG/M2 | HEIGHT: 74 IN | WEIGHT: 214.2 LBS | TEMPERATURE: 97.3 F

## 2024-09-19 DIAGNOSIS — M25.562 ACUTE PAIN OF LEFT KNEE: ICD-10-CM

## 2024-09-19 DIAGNOSIS — R52 PAIN: Primary | ICD-10-CM

## 2024-09-19 RX ORDER — TADALAFIL 5 MG/1
1 TABLET ORAL DAILY
COMMUNITY
Start: 2024-09-12

## 2024-10-02 DIAGNOSIS — F41.1 GAD (GENERALIZED ANXIETY DISORDER): ICD-10-CM

## 2024-12-18 DIAGNOSIS — E78.2 MIXED HYPERLIPIDEMIA: ICD-10-CM

## 2024-12-18 DIAGNOSIS — R73.01 IFG (IMPAIRED FASTING GLUCOSE): ICD-10-CM

## 2024-12-18 DIAGNOSIS — Z13.29 SCREENING FOR THYROID DISORDER: ICD-10-CM

## 2024-12-18 DIAGNOSIS — R74.8 ELEVATED AMYLASE: ICD-10-CM

## 2024-12-18 DIAGNOSIS — Z12.5 ENCOUNTER FOR SCREENING FOR MALIGNANT NEOPLASM OF PROSTATE: ICD-10-CM

## 2024-12-18 DIAGNOSIS — Z00.00 ENCOUNTER FOR SUBSEQUENT ANNUAL WELLNESS VISIT (AWV) IN MEDICARE PATIENT: Primary | ICD-10-CM

## 2024-12-18 DIAGNOSIS — N40.0 BENIGN PROSTATIC HYPERPLASIA WITHOUT LOWER URINARY TRACT SYMPTOMS: ICD-10-CM

## 2024-12-21 LAB
ALBUMIN SERPL-MCNC: 4.7 G/DL (ref 3.5–5.2)
ALBUMIN/GLOB SERPL: 2 G/DL
ALP SERPL-CCNC: 87 U/L (ref 39–117)
ALT SERPL-CCNC: 23 U/L (ref 1–41)
APPEARANCE UR: CLEAR
AST SERPL-CCNC: 20 U/L (ref 1–40)
BACTERIA #/AREA URNS HPF: NORMAL /[HPF]
BASOPHILS # BLD AUTO: 0.02 10*3/MM3 (ref 0–0.2)
BASOPHILS NFR BLD AUTO: 0.3 % (ref 0–1.5)
BILIRUB SERPL-MCNC: 0.6 MG/DL (ref 0–1.2)
BILIRUB UR QL STRIP: NEGATIVE
BUN SERPL-MCNC: 17 MG/DL (ref 8–23)
BUN/CREAT SERPL: 17.2 (ref 7–25)
CALCIUM SERPL-MCNC: 9.3 MG/DL (ref 8.6–10.5)
CASTS URNS QL MICRO: NORMAL /LPF
CHLORIDE SERPL-SCNC: 103 MMOL/L (ref 98–107)
CHOLEST SERPL-MCNC: 184 MG/DL (ref 0–200)
CHOLEST/HDLC SERPL: 4.28 {RATIO}
CO2 SERPL-SCNC: 26.3 MMOL/L (ref 22–29)
COLOR UR: YELLOW
CREAT SERPL-MCNC: 0.99 MG/DL (ref 0.76–1.27)
EGFRCR SERPLBLD CKD-EPI 2021: 83 ML/MIN/1.73
EOSINOPHIL # BLD AUTO: 0.14 10*3/MM3 (ref 0–0.4)
EOSINOPHIL NFR BLD AUTO: 1.8 % (ref 0.3–6.2)
EPI CELLS #/AREA URNS HPF: NORMAL /HPF (ref 0–10)
ERYTHROCYTE [DISTWIDTH] IN BLOOD BY AUTOMATED COUNT: 13.1 % (ref 12.3–15.4)
GLOBULIN SER CALC-MCNC: 2.3 GM/DL
GLUCOSE SERPL-MCNC: 85 MG/DL (ref 65–99)
GLUCOSE UR QL STRIP: NEGATIVE
HBA1C MFR BLD: 5.6 % (ref 4.8–5.6)
HCT VFR BLD AUTO: 47.8 % (ref 37.5–51)
HDLC SERPL-MCNC: 43 MG/DL (ref 40–60)
HGB BLD-MCNC: 15.6 G/DL (ref 13–17.7)
HGB UR QL STRIP: NEGATIVE
IMM GRANULOCYTES # BLD AUTO: 0.05 10*3/MM3 (ref 0–0.05)
IMM GRANULOCYTES NFR BLD AUTO: 0.6 % (ref 0–0.5)
KETONES UR QL STRIP: NEGATIVE
LDLC SERPL CALC-MCNC: 113 MG/DL (ref 0–100)
LEUKOCYTE ESTERASE UR QL STRIP: NEGATIVE
LYMPHOCYTES # BLD AUTO: 1.83 10*3/MM3 (ref 0.7–3.1)
LYMPHOCYTES NFR BLD AUTO: 23.5 % (ref 19.6–45.3)
MCH RBC QN AUTO: 28.2 PG (ref 26.6–33)
MCHC RBC AUTO-ENTMCNC: 32.6 G/DL (ref 31.5–35.7)
MCV RBC AUTO: 86.3 FL (ref 79–97)
MICRO URNS: NORMAL
MICRO URNS: NORMAL
MONOCYTES # BLD AUTO: 0.75 10*3/MM3 (ref 0.1–0.9)
MONOCYTES NFR BLD AUTO: 9.6 % (ref 5–12)
NEUTROPHILS # BLD AUTO: 4.99 10*3/MM3 (ref 1.7–7)
NEUTROPHILS NFR BLD AUTO: 64.2 % (ref 42.7–76)
NITRITE UR QL STRIP: NEGATIVE
NRBC BLD AUTO-RTO: 0 /100 WBC (ref 0–0.2)
PH UR STRIP: 7 [PH] (ref 5–7.5)
PLATELET # BLD AUTO: 211 10*3/MM3 (ref 140–450)
POTASSIUM SERPL-SCNC: 4.1 MMOL/L (ref 3.5–5.2)
PROT SERPL-MCNC: 7 G/DL (ref 6–8.5)
PROT UR QL STRIP: NEGATIVE
PSA SERPL-MCNC: 0.63 NG/ML (ref 0–4)
RBC # BLD AUTO: 5.54 10*6/MM3 (ref 4.14–5.8)
RBC #/AREA URNS HPF: NORMAL /HPF (ref 0–2)
SODIUM SERPL-SCNC: 139 MMOL/L (ref 136–145)
SP GR UR STRIP: 1.02 (ref 1–1.03)
TRIGL SERPL-MCNC: 161 MG/DL (ref 0–150)
TSH SERPL DL<=0.005 MIU/L-ACNC: 2.85 UIU/ML (ref 0.27–4.2)
URINALYSIS REFLEX: NORMAL
UROBILINOGEN UR STRIP-MCNC: 0.2 MG/DL (ref 0.2–1)
VLDLC SERPL CALC-MCNC: 28 MG/DL (ref 5–40)
WBC # BLD AUTO: 7.78 10*3/MM3 (ref 3.4–10.8)
WBC #/AREA URNS HPF: NORMAL /HPF (ref 0–5)

## 2024-12-23 LAB
AMYLASE SERPL-CCNC: 139 U/L (ref 28–100)
WRITTEN AUTHORIZATION: NORMAL

## 2024-12-26 ENCOUNTER — OFFICE VISIT (OUTPATIENT)
Dept: INTERNAL MEDICINE | Facility: CLINIC | Age: 69
End: 2024-12-26
Payer: MEDICARE

## 2024-12-26 VITALS
SYSTOLIC BLOOD PRESSURE: 124 MMHG | HEART RATE: 69 BPM | HEIGHT: 74 IN | TEMPERATURE: 97.9 F | DIASTOLIC BLOOD PRESSURE: 70 MMHG | BODY MASS INDEX: 27.44 KG/M2 | OXYGEN SATURATION: 97 % | WEIGHT: 213.8 LBS

## 2024-12-26 DIAGNOSIS — K63.5 POLYP OF COLON, UNSPECIFIED PART OF COLON, UNSPECIFIED TYPE: ICD-10-CM

## 2024-12-26 DIAGNOSIS — N52.9 ERECTILE DYSFUNCTION, UNSPECIFIED ERECTILE DYSFUNCTION TYPE: ICD-10-CM

## 2024-12-26 DIAGNOSIS — R73.01 IFG (IMPAIRED FASTING GLUCOSE): ICD-10-CM

## 2024-12-26 DIAGNOSIS — E78.2 MIXED HYPERLIPIDEMIA: ICD-10-CM

## 2024-12-26 DIAGNOSIS — R74.8 ELEVATED AMYLASE: ICD-10-CM

## 2024-12-26 DIAGNOSIS — G47.33 OBSTRUCTIVE SLEEP APNEA, ADULT: ICD-10-CM

## 2024-12-26 DIAGNOSIS — Z00.00 ENCOUNTER FOR SUBSEQUENT ANNUAL WELLNESS VISIT (AWV) IN MEDICARE PATIENT: ICD-10-CM

## 2024-12-26 DIAGNOSIS — Z00.01 ENCOUNTER FOR GENERAL ADULT MEDICAL EXAMINATION WITH ABNORMAL FINDINGS: Primary | ICD-10-CM

## 2024-12-26 DIAGNOSIS — M76.891 TENDONITIS OF KNEE, RIGHT: ICD-10-CM

## 2024-12-26 DIAGNOSIS — N48.6 PEYRONIE'S DISEASE: ICD-10-CM

## 2024-12-26 DIAGNOSIS — N40.0 BENIGN PROSTATIC HYPERPLASIA WITHOUT LOWER URINARY TRACT SYMPTOMS: ICD-10-CM

## 2024-12-26 DIAGNOSIS — F41.1 GAD (GENERALIZED ANXIETY DISORDER): ICD-10-CM

## 2024-12-26 PROCEDURE — 1170F FXNL STATUS ASSESSED: CPT | Performed by: INTERNAL MEDICINE

## 2024-12-26 PROCEDURE — 96160 PT-FOCUSED HLTH RISK ASSMT: CPT | Performed by: INTERNAL MEDICINE

## 2024-12-26 PROCEDURE — 99397 PER PM REEVAL EST PAT 65+ YR: CPT | Performed by: INTERNAL MEDICINE

## 2024-12-26 PROCEDURE — G0439 PPPS, SUBSEQ VISIT: HCPCS | Performed by: INTERNAL MEDICINE

## 2024-12-26 PROCEDURE — 1160F RVW MEDS BY RX/DR IN RCRD: CPT | Performed by: INTERNAL MEDICINE

## 2024-12-26 PROCEDURE — 1126F AMNT PAIN NOTED NONE PRSNT: CPT | Performed by: INTERNAL MEDICINE

## 2024-12-26 PROCEDURE — 1159F MED LIST DOCD IN RCRD: CPT | Performed by: INTERNAL MEDICINE

## 2024-12-26 NOTE — PROGRESS NOTES
Subjective   Uriel Garcia is a 69 y.o. male who presents for a Medicare Well Visit    Patient Care Team:  Gregor Gomez MD as PCP - General (Internal Medicine)    Recent Hospitalizations: No recent hospitalization(s).    Depression Screen:       2024     2:52 PM   PHQ-2/PHQ-9 Depression Screening   Little interest or pleasure in doing things Not at all   Feeling down, depressed, or hopeless Not at all       Functional and Cognitive Screenin/26/2024     2:52 PM   Functional & Cognitive Status   Do you have difficulty preparing food and eating? No   Do you have difficulty bathing yourself, getting dressed or grooming yourself? No   Do you have difficulty using the toilet? No   Do you have difficulty moving around from place to place? No   Do you have trouble with steps or getting out of a bed or a chair? No   Current Diet Unhealthy Diet   Dental Exam Up to date   Eye Exam Up to date   Exercise (times per week) 4 times per week   Current Exercises Include Tennis;Walking   Do you need help using the phone?  No   Are you deaf or do you have serious difficulty hearing?  No   Do you need help to go to places out of walking distance? No   Do you need help shopping? No   Do you need help preparing meals?  No   Do you need help with housework?  No   Do you need help with laundry? No   Do you need help taking your medications? No   Do you need help managing money? No   Do you ever drive or ride in a car without wearing a seat belt? No   Have you felt unusual stress, anger or loneliness in the last month? No   Who do you live with? Spouse   If you need help, do you have trouble finding someone available to you? No   Have you been bothered in the last four weeks by sexual problems? No   Do you have difficulty concentrating, remembering or making decisions? No       Does the patient have evidence of cognitive impairment? no    No opioid medication identified on active medication list. I have reviewed chart for  "other potential  high risk medication/s and harmful drug interactions in the elderly.          Does not need ASA (and currently is not on it)    Vitals:    12/26/24 1455   BP: 124/70   BP Location: Left arm   Patient Position: Sitting   Cuff Size: Adult   Pulse: 69   Temp: 97.9 °F (36.6 °C)   TempSrc: Infrared   SpO2: 97%   Weight: 97 kg (213 lb 12.8 oz)   Height: 188 cm (74.02\")   PainSc: 0-No pain       Body mass index is 27.44 kg/m².    Visual Acuity:  No results found.    Advanced Care Planning:  ACP discussion was held with the patient during this visit. Patient has an advance directive (not in EMR), copy requested.    Compared to one year ago, the patient feels physical health is the same.  Compared to one year ago, the patient feels mental health is the same.    Reviewed chart for potential of high risk medication in the elderly: yes  Reviewed chart for potential of harmful drug interactions in the elderly:yes    Identification of Risk Factors:  Risk factors include: Advance Directive Discussion  Cardiovascular risk  Colon Cancer Screening  Diabetic Lab Screening   Glaucoma Risk  Immunizations Discussed/Encouraged (specific immunizations; Tdap, Hepatitis A Vaccine/Series, Influenza, Prevnar 20 (Pneumococcal 20-valent conjugate), Shingrix, COVID19, and RSV (Respiratory Syncytial Virus) )  Obesity/Overweight   Prostate Cancer Screening .    Patient Self-Management and Personalized Health Advice  The patient has been provided with information about: diet, exercise, weight management, and alcohol use and preventive services including:   Annual Wellness Visit (AWV)  Colorectal Cancer Screening, Colonoscopy  Prostate Cancer Screening .  Follow Up: Medicare visit in one year    Discussed the patient's BMI with him. The BMI is above average; BMI management plan is completed.    Patient has multiple medical problems which are significant and separately identifiable that require additional work above and beyond the " "Medicare Wellness Visit. These are not trivial or insignificant and are billed with a 25-modifier    Chief Complaint   Patient presents with    Medicare Wellness-subsequent     Review of medical issues.       HPI:  Uriel Garcia is a 69 y.o. male RTC in yearly CPE/ AWV, review of medical issues:  \"I have a lot going on\".    - Had vaccines updated recently.   - Had a 'bad tooth' extracted 3 months ago, s/p implant. Feels like 'improved oral heatlh' since. Seeing dentist.   -Had cataract surgery with lens, out of eyeglasses and contacts now. Had some 'hallucinations a few times after the surgery', but acknowledges was 'in and out of sleep' and has a lot of dreams most nights. Worried pt with mother having Lewy Body dementia.  No memory issues noted. Family not concerned. No prior hallucinations.     Otherwise feeling really well.  Playing lots of tennis and does well, can outlast folks that are younger than him.  Had some R knee pain, s/p ortho eval and injx in R knee with near resolved knee pain. No OA noted on imaging with ortho.       1.  Elevated amylase  -has known persistent issue.  Patient notes \"quit drinking for 3 weeks before my labs).  \"The result was the same from when I did it before, it went up a little bit\".    2. KOKI - mood controlled on SSRI.  No change over year with that. 'Thankful I feel so good'. Has some seasonal dip in mood, but often exercise will help.   3. HLD, LDL baseline 194 off statin/ IFG - notes that off exercise in last 2 months with surgery and activity restrictions with surgery and knee. Weight is up.  Admits diet is not as good with holidays. Just got cleared from optho to get back to exercise. No issues with statin, has tolerated dose increase since last visit.  4. JUAN - controlled on CPAP nightly, compliant. No pressure changes.  \"I sleep great\".   5. ED -  Cialis PRN --> daily use.  Felt like the PRN dose was just not as helpful. Change to daily dose has helped.     Review of " Systems   Constitutional: Positive for weight gain. Negative for chills and fever.   HENT:  Positive for congestion (2-3 days, yellow mucous. Already improving.). Negative for ear discharge, ear pain, hearing loss, odynophagia and sore throat.    Eyes:  Negative for discharge, double vision, pain and redness.        Saw optho ~11/2024   Cardiovascular:  Negative for chest pain, dyspnea on exertion, irregular heartbeat, leg swelling, near-syncope, palpitations and syncope.   Respiratory:  Positive for cough (minimal with post nasal gtt). Negative for shortness of breath, sleep disturbances due to breathing and snoring.    Endocrine: Negative for polydipsia, polyphagia and polyuria.   Hematologic/Lymphatic: Negative for bleeding problem. Does not bruise/bleed easily.   Skin:  Negative for rash and suspicious lesions.   Musculoskeletal:  Positive for joint pain (R knee). Negative for joint swelling, muscle cramps, muscle weakness and myalgias.   Gastrointestinal:  Negative for constipation, diarrhea, dysphagia, heartburn, nausea and vomiting.   Genitourinary:  Negative for dysuria, frequency, hematuria, hesitancy and incomplete emptying.   Neurological:  Negative for dizziness, headaches and light-headedness.   Psychiatric/Behavioral:  Negative for altered mental status and depression. The patient is not nervous/anxious.    Allergic/Immunologic: Negative for persistent infections.       @OBJECTIVE BEGIN@  Vitals:    12/26/24 1455   BP: 124/70   Pulse: 69   Temp: 97.9 °F (36.6 °C)   SpO2: 97%     Physical Exam  Vitals reviewed.   Constitutional:       General: He is not in acute distress.     Appearance: Normal appearance. He is well-developed. He is not ill-appearing or toxic-appearing.   HENT:      Head: Normocephalic and atraumatic.      Right Ear: Hearing, tympanic membrane, ear canal and external ear normal. There is no impacted cerumen.      Left Ear: Hearing, tympanic membrane, ear canal and external ear normal.  There is no impacted cerumen.      Nose: Nose normal.      Mouth/Throat:      Mouth: Mucous membranes are moist. No oral lesions.      Dentition: Abnormal dentition (Missing tooth right lower molar, implant in place).      Tongue: No lesions.      Pharynx: Oropharynx is clear. Uvula midline. No pharyngeal swelling, oropharyngeal exudate, posterior oropharyngeal erythema or uvula swelling.   Eyes:      General: Lids are normal. No scleral icterus.        Right eye: No discharge.         Left eye: No discharge.      Extraocular Movements: Extraocular movements intact.      Conjunctiva/sclera: Conjunctivae normal.      Pupils: Pupils are equal, round, and reactive to light.   Neck:      Thyroid: No thyroid mass or thyromegaly.      Vascular: No carotid bruit.   Cardiovascular:      Rate and Rhythm: Normal rate and regular rhythm.      Pulses:           Radial pulses are 2+ on the right side and 2+ on the left side.        Dorsalis pedis pulses are 2+ on the right side and 2+ on the left side.        Posterior tibial pulses are 2+ on the right side and 2+ on the left side.      Heart sounds: Normal heart sounds, S1 normal and S2 normal. No murmur heard.     No friction rub. No gallop.   Pulmonary:      Effort: Pulmonary effort is normal. No respiratory distress.      Breath sounds: Normal breath sounds. No wheezing, rhonchi or rales.   Abdominal:      General: Bowel sounds are normal. There is no distension.      Palpations: Abdomen is soft. There is no mass.      Tenderness: There is no abdominal tenderness. There is no guarding or rebound.   Genitourinary:     Prostate: Normal. Not enlarged, not tender and no nodules present.      Rectum: Normal. No external hemorrhoid. Normal anal tone.   Musculoskeletal:         General: No deformity. Normal range of motion.      Right shoulder: No tenderness, bony tenderness or crepitus. Normal range of motion.      Left shoulder: No tenderness, bony tenderness or crepitus. Normal  range of motion.      Right hand: No tenderness or bony tenderness. Normal range of motion. Normal strength.      Left hand: No tenderness or bony tenderness. Normal range of motion. Normal strength.      Cervical back: Full passive range of motion without pain, normal range of motion and neck supple.      Right lower leg: No edema.      Left lower leg: No edema.   Lymphadenopathy:      Cervical: No cervical adenopathy.      Right cervical: No superficial, deep or posterior cervical adenopathy.     Left cervical: No superficial, deep or posterior cervical adenopathy.      Upper Body:      Right upper body: No supraclavicular, axillary or pectoral adenopathy.      Left upper body: No supraclavicular, axillary or pectoral adenopathy.   Skin:     General: Skin is warm and dry.      Findings: No rash.   Neurological:      Mental Status: He is alert and oriented to person, place, and time.      Cranial Nerves: No cranial nerve deficit, dysarthria or facial asymmetry.      Sensory: No sensory deficit.      Motor: Tremor (Very faint L > R action tremor; more notable jittery/shaky body movements during exam with activity/directed activity) present. No weakness, atrophy or abnormal muscle tone.      Gait: Gait normal.      Deep Tendon Reflexes: Reflexes are normal and symmetric.      Reflex Scores:       Patellar reflexes are 2+ on the right side and 2+ on the left side.       Achilles reflexes are 2+ on the right side and 2+ on the left side.  Psychiatric:         Attention and Perception: Attention normal.         Mood and Affect: Mood normal.         Speech: Speech normal.         Behavior: Behavior normal. Behavior is cooperative.         Thought Content: Thought content normal.           Assessment & Plan   Diagnoses and all orders for this visit:    1. Encounter for general adult medical examination with abnormal findings (Primary)    2. Encounter for subsequent annual wellness visit (AWV) in Medicare patient    3.  Tendonitis of knee, right    4. Benign prostatic hyperplasia without lower urinary tract symptoms    5. Polyp of colon, unspecified part of colon, unspecified type    6. Elevated amylase    7. Erectile dysfunction, unspecified erectile dysfunction type    8. KOKI (generalized anxiety disorder)    9. Mixed hyperlipidemia    10. IFG (impaired fasting glucose)    11. Obstructive sleep apnea, adult    12. Peyronie's disease        Diagnoses and all orders for this visit:    Encounter for general adult medical examination with abnormal findings    Encounter for subsequent annual wellness visit (AWV) in Medicare patient    Tendonitis of knee, right    Benign prostatic hyperplasia without lower urinary tract symptoms    Polyp of colon, unspecified part of colon, unspecified type    Elevated amylase    Erectile dysfunction, unspecified erectile dysfunction type    KOKI (generalized anxiety disorder)    Mixed hyperlipidemia    IFG (impaired fasting glucose)    Obstructive sleep apnea, adult    Peyronie's disease        Uriel Garcia is a 69 y.o. male RTC in yearly CPE/ AWV, review of medical issues:   1. Right molar tooth abscess -s/p extraction 3 months ago and s/p implant.  Overall doing better with improved oral health.F/U dentistry as planned  2.  Hx of one week of variable GI sx noted early 6/2022; event with marked constipation and nausea/ bloating after drank 2 large ETOH drinks after dehydrating golf round. Followed by lizbeth colored stools and decline in energy - recovered fully at this time, no remnant sx.  Recall CT A/P at time was unremarkable and sx resolved on own.  Lipase long since resolved, but amylase has been elevated persistently, non-progressive.  Recall, assessed pt for celiac disease, HIV infection, lymphoma, rheumatoid arthritis, and monoclonal gammopathy in 8/2022 and w/u negative.  Do will wonder if related to ETOH intake, but did not resolve after cessation trial last year and stated cessation trial  "this year.  D/W pt Ddx includes chronic amylasemia due to genetic condition (Diana's syndrome) vs. Unrecognized ETOH intake.  Did review with patient his \"jitteriness\" on exam today and my concern that overall still with over intake of EtOH.  Patient agreed to phosphatidyl ethanol level with next labs as a way to get a broader picture of his overall alcohol intake.  Will readdress at next visit.  3. KOKI - mood controlled on SSRI, some variability when off exercise and with seasonal changes.S/P talk therapy with Dr. Hannah, now out of therapy.  D/W my concern about EtOH and THC effect on his mood and efficacy of SSRI.  Advised more consistent cessation, particularly with binge event of EtOH intake.  4. Oral HSV, R lower lip -  topical acyclovir PRN. GERRY.  5. HLD, LDL baseline 194 off statin/ IFG -LDL near goal today, confounded by recent exercise cessation postop.  C/W current statin and restart exercise.  TLC diet modifications.  FBS and A1c WNL today.    6. JUAN - controlled on CPAP nightly, compliant. [facility] sleep med 5/2024. Weight loss advised.  7. Colon polyps x  6 on 12/2019 C-scope (1TA and 1 SSA) with Dr. Skaggs - 3/2023 (TA) --> 5 years   8. ED -  Cialis PRN --> daily use.  Per urology.  8.  Peyronnie's plaque - new dx 2022,  no tx advised per urology 3/2023. Trend, per urology.   9. HM - labs d/w pt; Flu/ Tdap/ Hep A/ Prevnar 20/ COVID/ Shingrix - UTD; RSV reviewed with pt, advised to complete at Rx; C-scope 11/2019 (6 polyps) --> 3/2023 (TA) --> 5 years; JENNIFER per urology, PSA OK today; Hep C  Ab (-) 2/2017; c/w exercise; Preventative care plan provided to pt at end of visit    Return in about 6 months (around 6/26/2025) for Recheck.  (CMP, LDL, A1c, phosphatidylethanol)          Current Outpatient Medications:     atorvastatin (LIPITOR) 20 MG tablet, TAKE 1 TABLET EVERY DAY, Disp: 90 tablet, Rfl: 3    ibuprofen (ADVIL,MOTRIN) 200 MG tablet, Takes 2 tabs PO prior to tennis game, Disp: , Rfl:     sertraline " (ZOLOFT) 50 MG tablet, TAKE 1 TABLET BY MOUTH DAILY, Disp: 30 tablet, Rfl: 5    tadalafil (CIALIS) 5 MG tablet, Take 1 tablet by mouth Daily., Disp: , Rfl:

## 2024-12-26 NOTE — PATIENT INSTRUCTIONS
RSV vaccine at pharmacy advised.       Medicare Wellness  Personal Prevention Plan of Service     Date of Office Visit:    Encounter Provider:  Gregor Gomez MD  Place of Service:  [facility]  Patient Name: Uriel TODD:  1955    As part of the Medicare Wellness portion of your visit today, we are providing you with this personalized preventive plan of services (PPPS). This plan is based upon recommendations of the [place] Preventive Services Task Force (USPSTF) and the Advisory Committee on Immunization Practices (ACIP).    This lists the preventive care services that should be considered, and provides dates of when you are due. Items listed as completed are up-to-date and do not require any further intervention.    Health Maintenance   Topic Date Due    LIPID PANEL  2025    ANNUAL WELLNESS VISIT  2025    BMI FOLLOWUP  2025    COLORECTAL CANCER SCREENING  2028    TDAP/TD VACCINES (3 - Td or Tdap) 2029    HEPATITIS C SCREENING  Completed    COVID-19 Vaccine  Completed    INFLUENZA VACCINE  Completed    Pneumococcal Vaccine 65+  Completed    ZOSTER VACCINE  Completed       No orders of the defined types were placed in this encounter.      Return in about 6 months (around 2025) for Recheck.

## 2025-01-17 DIAGNOSIS — E78.2 MIXED HYPERLIPIDEMIA: ICD-10-CM

## 2025-01-17 DIAGNOSIS — F41.1 GAD (GENERALIZED ANXIETY DISORDER): ICD-10-CM

## 2025-01-17 RX ORDER — ATORVASTATIN CALCIUM 20 MG/1
20 TABLET, FILM COATED ORAL DAILY
Qty: 90 TABLET | Refills: 3 | Status: SHIPPED | OUTPATIENT
Start: 2025-01-17

## 2025-01-17 NOTE — TELEPHONE ENCOUNTER
Caller: Uriel Garcia    Relationship: Self    Best call back number: [phone]     Requested Prescriptions:   Requested Prescriptions     Pending Prescriptions Disp Refills    sertraline (ZOLOFT) 50 MG tablet 30 tablet 5     Sig: TAKE 1 TABLET BY MOUTH DAILY    atorvastatin (LIPITOR) 20 MG tablet 90 tablet 3     Sig: Take 1 tablet by mouth Daily.        Pharmacy where request should be sent: [facility] [address] [phone] [facility] [phone] [facility]     Last office visit with prescribing clinician: 12/26/2024   Last telemedicine visit with prescribing clinician: Visit date not found   Next office visit with prescribing clinician: 6/27/2025         Manju Will Rep   01/17/25 12:16 EST

## 2025-02-19 DIAGNOSIS — E78.2 MIXED HYPERLIPIDEMIA: ICD-10-CM

## 2025-02-19 RX ORDER — ATORVASTATIN CALCIUM 20 MG/1
20 TABLET, FILM COATED ORAL DAILY
Qty: 90 TABLET | Refills: 3 | Status: SHIPPED | OUTPATIENT
Start: 2025-02-19

## 2025-05-22 ENCOUNTER — TELEPHONE (OUTPATIENT)
Dept: INTERNAL MEDICINE | Facility: CLINIC | Age: 70
End: 2025-05-22
Payer: MEDICARE

## 2025-05-22 NOTE — TELEPHONE ENCOUNTER
Caller: [facility]    Relationship:     Best call back number: [phone]     What is the best time to reach you: ANY    Who are you requesting to speak with (clinical staff, provider,  specific staff member):     Do you know the name of the person who called: LUCIA    What was the call regarding: THIS IS A FOLLOW UP CALL ABOUT A FAX THAT WAS SENT TO YOU IN REGARDS TO CPAP SUPPLIES. THEY HAVEN'T HEARD BACK JUST WANTED TO MAKE SURE THAT HAD BEEN RECEIVED.     PLEASE ADVISE

## 2025-05-22 NOTE — TELEPHONE ENCOUNTER
The fax was sent back advising that the request needed to be sent to patient's sleep/pulmonary provider.     Will call to advise them

## 2025-06-06 ENCOUNTER — OFFICE VISIT (OUTPATIENT)
Dept: SLEEP MEDICINE | Facility: HOSPITAL | Age: 70
End: 2025-06-06
Payer: MEDICARE

## 2025-06-06 VITALS
DIASTOLIC BLOOD PRESSURE: 79 MMHG | BODY MASS INDEX: 26.56 KG/M2 | HEIGHT: 74 IN | OXYGEN SATURATION: 95 % | WEIGHT: 207 LBS | SYSTOLIC BLOOD PRESSURE: 134 MMHG | HEART RATE: 69 BPM

## 2025-06-06 DIAGNOSIS — G47.33 OBSTRUCTIVE SLEEP APNEA, ADULT: Primary | ICD-10-CM

## 2025-06-06 PROCEDURE — G0463 HOSPITAL OUTPT CLINIC VISIT: HCPCS

## 2025-06-06 NOTE — PROGRESS NOTES
"[facility]  Telephone: [phone] / Fax: [phone] [place]  Telephone: [phone] / Fax: [phone] Judy Mayberry    PCP: Gregor Gomez MD    Reason for visit: JUAN f/u    Uriel Garcia is a 69 y.o.male  was last seen at [facility] sleep lab in  May 2024. He uses nasal pillow mask, it fits well, no significant leaks or excessive dry mouth reported.  He replaces the nasal pillows once per month. His health has been stable.  He got his current DreamStation 2 machine from [employer] in 2022.    SH- retired 8 years ago    DME [facility]    Current Medications:    Current Outpatient Medications:     atorvastatin (LIPITOR) 20 MG tablet, TAKE 1 TABLET EVERY DAY, Disp: 90 tablet, Rfl: 3    ibuprofen (ADVIL,MOTRIN) 200 MG tablet, Takes 2 tabs PO prior to tennis game, Disp: , Rfl:     sertraline (ZOLOFT) 50 MG tablet, TAKE 1 TABLET BY MOUTH DAILY, Disp: 30 tablet, Rfl: 5    tadalafil (CIALIS) 5 MG tablet, Take 1 tablet by mouth Daily., Disp: , Rfl:    also entered in Sleep Questionnaire    Patient  has a past medical history of Anxiety, Arthritis, Benign colonic polyp, Benign prostatic hyperplasia without lower urinary tract symptoms (07/20/2023), Bone bruise (2018), BPPV (benign paroxysmal positional vertigo) (10/20/2016), Cataract (07/2022), Depression, Dislocation of finger, Erectile dysfunction, Frozen shoulder syndrome (2018), Hyperlipidemia, Peyronie's disease (07/20/2023), Sickle cell anemia, and Sleep apnea.    I have reviewed the Past Medical History, Past Surgical History, Social History and Family History.    Vital Signs /79   Pulse 69   Ht 188 cm (74.02\")   Wt 93.9 kg (207 lb)   SpO2 95%   BMI 26.56 kg/m²  Body mass index is 26.56 kg/m².    General Alert and oriented. No acute distress noted   Pharynx/Throat Class IV Mallampati airway, large tongue, no evidence of redundant lateral pharyngeal tissue. No oral lesions. No thrush. Moist mucous membranes.   Head Normocephalic. " Symmetrical. Atraumatic.    Nose No septal deviation. No drainage   Chest Wall Normal shape. Symmetric expansion with respiration. No tenderness.   Neck Trachea midline, no thyromegaly or adenopathy    Lungs Clear to auscultation bilaterally. No wheezes. No rhonchi. No rales. Respirations regular, even and unlabored.   Heart Regular rhythm and normal rate. Normal S1 and S2. No murmur   Abdomen Soft, non-tender and non-distended. Normal bowel sounds. No masses.   Extremities Moves all extremities well. No edema   Psychiatric Normal mood and affect.     Testing:  Download last 90 d 98% use with average nightly use of 10 hours and 13 minutes on auto CPAP 5-15cm H2O, avg pressure 9.1cm H2O, AHI 2.2/hr, leak is 29 seconds.    Study  3/6/14  FINDINGS: Diagnostic study from 9:42 p.m. to 5:55 a.m. Sleep efficiency was  somewhat poor at 77%, but there was adequate sleep time of 6.33 hours for  diagnostic purposes. Sleep distribution showed 7.8% stage I sleep, 69.2% stage  II sleep, 12.8% slow-wave sleep, 10.3% REM sleep. Apnea-hypopnea index is 14.7  events an hour indicating moderately severe obstructive sleep apnea which is  almost severe by respiratory disturbance index criteria at 28 events an hour.  In the supine position, very, very severe obstructive sleep apnea is present  with AHI of 76.9. In REM sleep, AHI was 10.8 with RDI of 17. Oxygen saturation  fell below 90% for 6% of total sleep time indicating significant sleep-related hypoxemia. Arousal index high at 41 and patient had 68.12% time snoring.        Impression:  1. Obstructive sleep apnea, adult          Plan:  Patient uses the CPAP device and benefits from its use in terms of reduction of hypersomnia and snoring.  AHI appears to be within adequate range. I reviewed download report and original sleep study report with the patient. I advised pt to contact us if PAP pressures feel excessive or insufficient. I renewed prescription for CPAP accessories.    Weight  loss will be strongly beneficial to reduce the severity of sleep-disordered breathing.     Follow up with Dr. Arambula in one year    Thank you for allowing me to participate in your patient's care.      JONH Heath  [facility]  Phone: [phone]      Part of this note may be an electronic transcription/translation of spoken language to printed text using the Dragon Dictation System.

## 2025-06-16 DIAGNOSIS — F10.20 ETOHISM: ICD-10-CM

## 2025-06-16 DIAGNOSIS — R74.8 ELEVATED AMYLASE: ICD-10-CM

## 2025-06-16 DIAGNOSIS — N40.0 BENIGN PROSTATIC HYPERPLASIA WITHOUT LOWER URINARY TRACT SYMPTOMS: ICD-10-CM

## 2025-06-16 DIAGNOSIS — R73.01 IFG (IMPAIRED FASTING GLUCOSE): ICD-10-CM

## 2025-06-16 DIAGNOSIS — E78.2 MIXED HYPERLIPIDEMIA: Primary | ICD-10-CM

## 2025-06-20 ENCOUNTER — HOSPITAL ENCOUNTER (EMERGENCY)
Facility: HOSPITAL | Age: 70
Discharge: HOME | End: 2025-06-21
Attending: EMERGENCY MEDICINE
Payer: MEDICARE

## 2025-06-20 DIAGNOSIS — R55 SYNCOPE AND COLLAPSE: Primary | ICD-10-CM

## 2025-06-20 LAB
BASOPHILS # BLD AUTO: 0.02 10*3/MM3 (ref 0–0.2)
BASOPHILS NFR BLD AUTO: 0.3 % (ref 0–1.5)
DEPRECATED RDW RBC AUTO: 41.6 FL (ref 37–54)
EOSINOPHIL # BLD AUTO: 0.09 10*3/MM3 (ref 0–0.4)
EOSINOPHIL NFR BLD AUTO: 1.3 % (ref 0.3–6.2)
ERYTHROCYTE [DISTWIDTH] IN BLOOD BY AUTOMATED COUNT: 13.4 % (ref 12.3–15.4)
HCT VFR BLD AUTO: 43.5 % (ref 37.5–51)
HGB BLD-MCNC: 13.9 G/DL (ref 13–17.7)
HOLD SPECIMEN: NORMAL
HOLD SPECIMEN: NORMAL
IMM GRANULOCYTES # BLD AUTO: 0.03 10*3/MM3 (ref 0–0.05)
IMM GRANULOCYTES NFR BLD AUTO: 0.4 % (ref 0–0.5)
LYMPHOCYTES # BLD AUTO: 2.04 10*3/MM3 (ref 0.7–3.1)
LYMPHOCYTES NFR BLD AUTO: 30.5 % (ref 19.6–45.3)
MCH RBC QN AUTO: 27.2 PG (ref 26.6–33)
MCHC RBC AUTO-ENTMCNC: 32 G/DL (ref 31.5–35.7)
MCV RBC AUTO: 85.1 FL (ref 79–97)
MONOCYTES # BLD AUTO: 0.8 10*3/MM3 (ref 0.1–0.9)
MONOCYTES NFR BLD AUTO: 12 % (ref 5–12)
NEUTROPHILS NFR BLD AUTO: 3.71 10*3/MM3 (ref 1.7–7)
NEUTROPHILS NFR BLD AUTO: 55.5 % (ref 42.7–76)
NRBC BLD AUTO-RTO: 0 /100 WBC (ref 0–0.2)
PLATELET # BLD AUTO: 201 10*3/MM3 (ref 140–450)
PMV BLD AUTO: 10.5 FL (ref 6–12)
RBC # BLD AUTO: 5.11 10*6/MM3 (ref 4.14–5.8)
WBC NRBC COR # BLD AUTO: 6.69 10*3/MM3 (ref 3.4–10.8)
WHOLE BLOOD HOLD COAG: NORMAL
WHOLE BLOOD HOLD SPECIMEN: NORMAL

## 2025-06-20 PROCEDURE — 93005 ELECTROCARDIOGRAM TRACING: CPT | Performed by: EMERGENCY MEDICINE

## 2025-06-20 PROCEDURE — 85025 COMPLETE CBC W/AUTO DIFF WBC: CPT | Performed by: EMERGENCY MEDICINE

## 2025-06-20 PROCEDURE — 83735 ASSAY OF MAGNESIUM: CPT | Performed by: EMERGENCY MEDICINE

## 2025-06-20 PROCEDURE — 82550 ASSAY OF CK (CPK): CPT | Performed by: EMERGENCY MEDICINE

## 2025-06-20 PROCEDURE — 93010 ELECTROCARDIOGRAM REPORT: CPT | Performed by: INTERNAL MEDICINE

## 2025-06-20 PROCEDURE — 84484 ASSAY OF TROPONIN QUANT: CPT | Performed by: EMERGENCY MEDICINE

## 2025-06-20 PROCEDURE — 25810000003 LACTATED RINGERS SOLUTION: Performed by: EMERGENCY MEDICINE

## 2025-06-20 PROCEDURE — 84100 ASSAY OF PHOSPHORUS: CPT | Performed by: EMERGENCY MEDICINE

## 2025-06-20 PROCEDURE — 99283 EMERGENCY DEPT VISIT LOW MDM: CPT

## 2025-06-20 PROCEDURE — 80053 COMPREHEN METABOLIC PANEL: CPT | Performed by: EMERGENCY MEDICINE

## 2025-06-20 RX ORDER — SODIUM CHLORIDE 0.9 % (FLUSH) 0.9 %
10 SYRINGE (ML) INJECTION AS NEEDED
Status: DISCONTINUED | OUTPATIENT
Start: 2025-06-20 | End: 2025-06-21 | Stop reason: HOSPADM

## 2025-06-20 RX ADMIN — SODIUM CHLORIDE, POTASSIUM CHLORIDE, SODIUM LACTATE AND CALCIUM CHLORIDE 1000 ML: 600; 310; 30; 20 INJECTION, SOLUTION INTRAVENOUS at 23:19

## 2025-06-21 VITALS
WEIGHT: 204.37 LBS | TEMPERATURE: 97.6 F | BODY MASS INDEX: 26.23 KG/M2 | HEART RATE: 56 BPM | OXYGEN SATURATION: 97 % | RESPIRATION RATE: 16 BRPM | HEIGHT: 74 IN | DIASTOLIC BLOOD PRESSURE: 69 MMHG | SYSTOLIC BLOOD PRESSURE: 107 MMHG

## 2025-06-21 LAB
ALBUMIN SERPL-MCNC: 4.6 G/DL (ref 3.5–5.2)
ALBUMIN/GLOB SERPL: 1.6 G/DL
ALP SERPL-CCNC: 90 U/L (ref 39–117)
ALT SERPL W P-5'-P-CCNC: 28 U/L (ref 1–41)
ANION GAP SERPL CALCULATED.3IONS-SCNC: 13.9 MMOL/L (ref 5–15)
AST SERPL-CCNC: 29 U/L (ref 1–40)
BILIRUB SERPL-MCNC: 0.5 MG/DL (ref 0–1.2)
BUN SERPL-MCNC: 21 MG/DL (ref 8–23)
BUN/CREAT SERPL: 19.1 (ref 7–25)
CALCIUM SPEC-SCNC: 9.5 MG/DL (ref 8.6–10.5)
CHLORIDE SERPL-SCNC: 104 MMOL/L (ref 98–107)
CK SERPL-CCNC: 269 U/L (ref 20–200)
CO2 SERPL-SCNC: 20.1 MMOL/L (ref 22–29)
CREAT SERPL-MCNC: 1.1 MG/DL (ref 0.76–1.27)
EGFRCR SERPLBLD CKD-EPI 2021: 73.1 ML/MIN/1.73
GEN 5 1HR TROPONIN T REFLEX: 7 NG/L
GLOBULIN UR ELPH-MCNC: 2.8 GM/DL
GLUCOSE SERPL-MCNC: 128 MG/DL (ref 65–99)
MAGNESIUM SERPL-MCNC: 2.3 MG/DL (ref 1.6–2.4)
PHOSPHATE SERPL-MCNC: 3.6 MG/DL (ref 2.5–4.5)
POTASSIUM SERPL-SCNC: 3.6 MMOL/L (ref 3.5–5.2)
PROT SERPL-MCNC: 7.4 G/DL (ref 6–8.5)
QT INTERVAL: 407 MS
QTC INTERVAL: 423 MS
SODIUM SERPL-SCNC: 138 MMOL/L (ref 136–145)
TROPONIN T NUMERIC DELTA: NORMAL
TROPONIN T SERPL HS-MCNC: <6 NG/L

## 2025-06-21 PROCEDURE — 36415 COLL VENOUS BLD VENIPUNCTURE: CPT

## 2025-06-21 PROCEDURE — 84484 ASSAY OF TROPONIN QUANT: CPT | Performed by: EMERGENCY MEDICINE

## 2025-06-21 NOTE — ED PROVIDER NOTES
EMERGENCY DEPARTMENT ENCOUNTER    History  Chief Complaint   Patient presents with    Syncope       History provided by: Patient and Relative     HPI:  Context: Uriel Garcia is a 69 y.o. male with a medical history of hyperlipidemia, JUAN, BPPV, obesity who presents to the ED c/o acute syncope.  Patient [place] most of the day [place] the golf [place].  He said he tried with his water intake, maybe drink about 80 ounces.  He was feeling well when he got home.  He then started to have some cramps in his calves and thighs.  He also did have a vodka drink.  He went to get up and had a syncopal episode.  Thinks he might have been out for about 5 minutes.  Prior to the episode, he reports feeling lightheaded, nausea and was profusely sweating.  He overall is feeling much better since the initial episode.  He feels tired and worn out and very thirsty, but denies any chest pain, shortness of breath, abdominal pain, nausea, vomiting or other symptoms.  Was not injured related to this event.      Past Medical History:  Active Ambulatory Problems     Diagnosis Date Noted    Hyperlipidemia 05/27/2016    Erectile dysfunction 08/17/2017    Obstructive sleep apnea, adult 11/09/2017    Colon polyp 05/02/2019    KOKI (generalized anxiety disorder) 02/24/2022    Elevated amylase 07/20/2023    Benign prostatic hyperplasia without lower urinary tract symptoms 07/20/2023    Peyronie's disease 07/20/2023    IFG (impaired fasting glucose) 07/20/2023     Resolved Ambulatory Problems     Diagnosis Date Noted    Anxiety 05/27/2016    Benign colonic polyp 05/27/2016    Edema of lower extremity 05/27/2016    Chest pain 05/27/2016    DELONG (dyspnea on exertion) 05/27/2016    Palpitations 05/27/2016    Right elbow pain 05/27/2016    Healthcare maintenance 10/20/2016    BPPV (benign paroxysmal positional vertigo) 10/20/2016    Right knee pain 02/16/2017    Chronic pain of left heel 02/16/2017    Chronic right shoulder pain 08/02/2017    Decreased range of  motion of right shoulder 08/02/2017    Decreased range of motion of right elbow 08/02/2017    Obesity (BMI 30-39.9) 11/09/2017    Chronic allergic rhinitis 11/09/2017     Past Medical History:   Diagnosis Date    Arthritis     Bone bruise 2018    Cataract 07/2022    Depression     Dislocation of finger     Frozen shoulder syndrome 2018    Sickle cell anemia     Sleep apnea        Past Surgical History:  Past Surgical History:   Procedure Laterality Date    CATARACT EXTRACTION EXTRACAPSULAR W/ INTRAOCULAR LENS IMPLANTATION Bilateral 11/2024    COLONOSCOPY  2014    COLONOSCOPY N/A 11/07/2019    Procedure: COLONOSCOPY  to cecum and TI  : cold biopsy polypectomies,;  Surgeon: Sage Skaggs MD;  Location: [facility] ENDOSCOPY;  Service: Gastroenterology    COLONOSCOPY N/A 03/07/2023    Procedure: COLONOSCOPY to cecum and TI with cold polypectomy;  Surgeon: Sage Skaggs MD;  Location: [facility] ENDOSCOPY;  Service: Gastroenterology;  Laterality: N/A;  PRE - hx of colon polyps  POST - polyp, internal hemorrhoies    DENTAL PROCEDURE  2024    implant after extraction    EYE SURGERY  12/17/2024    HERNIA REPAIR Right 2007    Inguinal    INGUINAL HERNIA REPAIR  2007    JOINT MANIPULATION Right 09/14/2017    Procedure: RIGHT SHOULDER MANIPULATION;  Surgeon: Jann Barger MD;  Location: [facility] OR [facility];  Service:     SHOULDER ARTHROSCOPY Right 09/14/2017    Procedure: ARTHROSCOPY LYSIS OF ADHESIONS AND BURSECTOMY RIGHT SHOULDER;  Surgeon: Jann Barger MD;  Location: [facility] OR [facility];  Service:     SHOULDER SURGERY      TOOTH EXTRACTION  01/2024         Family History:  Family History   Problem Relation Age of Onset    Hypertension Father     Nephrolithiasis Father     Heart attack Maternal Grandmother     Heart disease Maternal Grandmother         ASCVD    Depression Other         siblings    Diabetes Other     Diabetes Sister         Type I    Alcohol abuse Brother     Diabetes Brother         Type II    Hodgkin's lymphoma Maternal  Grandfather     Dementia Mother         Lewy Body    Parkinsonism Mother     No Known Problems Son     No Known Problems Son     Depression Sister     Diabetes Sister     Diabetes Brother     Malig Hyperthermia Neg Hx          Social History:  Social History     Socioeconomic History    Marital status:      Spouse name: Mamta    Number of children: 2   Tobacco Use    Smoking status: Never    Smokeless tobacco: Never   Vaping Use    Vaping status: Never Used   Substance and Sexual Activity    Alcohol use: Yes     Alcohol/week: 14.0 standard drinks of alcohol     Types: 14 Drinks containing 0.5 oz of alcohol per week     Comment: social about 8-10 drinks a week; has to 'limit myself'; 0930-1264 down to social use only, highly variable intake (at least 3-4x/ week)    Drug use: Not Currently     Frequency: 3.0 times per week     Types: Marijuana     Comment: very 'seldom', as edible, 1-2 yearly--> 3-2x/ month on average    Sexual activity: Yes     Partners: Female     Comment: wife only; no hx STD's         Allergies:  Patient has no known allergies.        Physical Exam  ED Triage Vitals [06/20/25 2256]   Temp Heart Rate Resp BP SpO2   97.6 °F (36.4 °C) 76 19 120/77 95 %      Temp src Heart Rate Source Patient Position BP Location FiO2 (%)   -- -- -- -- --     Physical Exam  Constitutional:       Appearance: Normal appearance.   HENT:      Head: Atraumatic.   Eyes:      Conjunctiva/sclera: Conjunctivae normal.   Cardiovascular:      Rate and Rhythm: Normal rate and regular rhythm.      Heart sounds: No murmur heard.  Pulmonary:      Effort: Pulmonary effort is normal. No respiratory distress.      Breath sounds: Normal breath sounds.   Abdominal:      Tenderness: There is no abdominal tenderness. There is no guarding or rebound.   Skin:     Capillary Refill: Capillary refill takes less than 2 seconds.   Neurological:      Mental Status: He is alert and oriented to person, place, and time.         Medications  Given in ER:   Medications   sodium chloride 0.9 % flush 10 mL (has no administration in time range)   lactated ringers bolus 1,000 mL (1,000 mL Intravenous New Bag 6/20/25 0249)         Orders Placed:  Orders Placed This Encounter   Procedures    [place] Draw    Comprehensive Metabolic Panel    Magnesium    High Sensitivity Troponin T    CBC Auto Differential    CK    Phosphorus    High Sensitivity Troponin T 1Hr    NPO Diet NPO Type: Strict NPO    Undress & Gown    Continuous Pulse Oximetry    Vital Signs    Orthostatic Blood Pressure    Ambulate Patient - Report tolerance to provider    Oxygen Therapy- Nasal Cannula; Titrate 1-6 LPM Per SpO2; 90 - 95%    POC Glucose Once    ECG 12 Lead Syncope    Insert Peripheral IV    CBC & Differential    Green Top (Gel)    Lavender Top    Gold Top - SST    Light Blue Top         Outpatient Medication Management:   Current Facility-Administered Medications Ordered in Epic   Medication Dose Route Frequency Provider Last Rate Last Admin    sodium chloride 0.9 % flush 10 mL  10 mL Intravenous PRN Melanie Tate MD         Current Outpatient Medications Ordered in Epic   Medication Sig Dispense Refill    atorvastatin (LIPITOR) 20 MG tablet TAKE 1 TABLET EVERY DAY 90 tablet 3    ibuprofen (ADVIL,MOTRIN) 200 MG tablet Takes 2 tabs PO prior to tennis game      sertraline (ZOLOFT) 50 MG tablet TAKE 1 TABLET BY MOUTH DAILY 30 tablet 5    tadalafil (CIALIS) 5 MG tablet Take 1 tablet by mouth Daily.             Medical Decision Making:  All labs have been independently interpreted by me.  All radiology studies have been reviewed by me. All EKGs have been independently viewed and interpreted by me.  Discussion below represents my analysis of pertinent findings related to patient's condition, differential diagnosis, treatment plan and final disposition.    Differential Diagnosis includes but not limited to: Arrhythmia, electrolyte disturbance, rhabdomyolysis, dehydration    Independent  Historian Required Due To: Contribute additional details of history    Review of prior external notes (non-ED) -and- Review of prior external test results outside of this encounter: I reviewed the primary care office visit note from 12/26/2024.     Labs Results:  Recent Results (from the past 24 hours)   ECG 12 Lead Syncope    Collection Time: 06/20/25 11:10 PM   Result Value Ref Range    QT Interval 407 ms    QTC Interval 423 ms   Comprehensive Metabolic Panel    Collection Time: 06/20/25 11:17 PM    Specimen: Blood   Result Value Ref Range    Glucose 128 (H) 65 - 99 mg/dL    BUN 21.0 8.0 - 23.0 mg/dL    Creatinine 1.10 0.76 - 1.27 mg/dL    Sodium 138 136 - 145 mmol/L    Potassium 3.6 3.5 - 5.2 mmol/L    Chloride 104 98 - 107 mmol/L    CO2 20.1 (L) 22.0 - 29.0 mmol/L    Calcium 9.5 8.6 - 10.5 mg/dL    Total Protein 7.4 6.0 - 8.5 g/dL    Albumin 4.6 3.5 - 5.2 g/dL    ALT (SGPT) 28 1 - 41 U/L    AST (SGOT) 29 1 - 40 U/L    Alkaline Phosphatase 90 39 - 117 U/L    Total Bilirubin 0.5 0.0 - 1.2 mg/dL    Globulin 2.8 gm/dL    A/G Ratio 1.6 g/dL    BUN/Creatinine Ratio 19.1 7.0 - 25.0    Anion Gap 13.9 5.0 - 15.0 mmol/L    eGFR 73.1 >60.0 mL/min/1.73   Magnesium    Collection Time: 06/20/25 11:17 PM    Specimen: Blood   Result Value Ref Range    Magnesium 2.3 1.6 - 2.4 mg/dL   High Sensitivity Troponin T    Collection Time: 06/20/25 11:17 PM    Specimen: Blood   Result Value Ref Range    HS Troponin T <6 <22 ng/L   Green Top (Gel)    Collection Time: 06/20/25 11:17 PM   Result Value Ref Range    Extra Tube Hold for add-ons.    Lavender Top    Collection Time: 06/20/25 11:17 PM   Result Value Ref Range    Extra Tube hold for add-on    Gold Top - SST    Collection Time: 06/20/25 11:17 PM   Result Value Ref Range    Extra Tube Hold for add-ons.    Light Blue Top    Collection Time: 06/20/25 11:17 PM   Result Value Ref Range    Extra Tube Hold for add-ons.    CBC Auto Differential    Collection Time: 06/20/25 11:17 PM     Specimen: Blood   Result Value Ref Range    WBC 6.69 3.40 - 10.80 10*3/mm3    RBC 5.11 4.14 - 5.80 10*6/mm3    Hemoglobin 13.9 13.0 - 17.7 g/dL    Hematocrit 43.5 37.5 - 51.0 %    MCV 85.1 79.0 - 97.0 fL    MCH 27.2 26.6 - 33.0 pg    MCHC 32.0 31.5 - 35.7 g/dL    RDW 13.4 12.3 - 15.4 %    RDW-SD 41.6 37.0 - 54.0 fl    MPV 10.5 6.0 - 12.0 fL    Platelets 201 140 - 450 10*3/mm3    Neutrophil % 55.5 42.7 - 76.0 %    Lymphocyte % 30.5 19.6 - 45.3 %    Monocyte % 12.0 5.0 - 12.0 %    Eosinophil % 1.3 0.3 - 6.2 %    Basophil % 0.3 0.0 - 1.5 %    Immature Grans % 0.4 0.0 - 0.5 %    Neutrophils, Absolute 3.71 1.70 - 7.00 10*3/mm3    Lymphocytes, Absolute 2.04 0.70 - 3.10 10*3/mm3    Monocytes, Absolute 0.80 0.10 - 0.90 10*3/mm3    Eosinophils, Absolute 0.09 0.00 - 0.40 10*3/mm3    Basophils, Absolute 0.02 0.00 - 0.20 10*3/mm3    Immature Grans, Absolute 0.03 0.00 - 0.05 10*3/mm3    nRBC 0.0 0.0 - 0.2 /100 WBC   CK    Collection Time: 06/20/25 11:17 PM    Specimen: Blood   Result Value Ref Range    Creatine Kinase 269 (H) 20 - 200 U/L   Phosphorus    Collection Time: 06/20/25 11:17 PM    Specimen: Blood   Result Value Ref Range    Phosphorus 3.6 2.5 - 4.5 mg/dL   High Sensitivity Troponin T 1Hr    Collection Time: 06/21/25 12:10 AM    Specimen: Blood   Result Value Ref Range    HS Troponin T 7 <22 ng/L    Troponin T Numeric Delta       Lab Comments:  My independent interpretation of the above labs:       EKG Interpreted by me: Normal sinus rhythm, anterior repolarization changes which do not occur to be acutely changed from previous      Rationale:  This is an overall well-appearing 69-year-old male who is presenting today secondary to a syncopal event.  This was in the setting of being outside golfing all day.  He does not report any concerning symptoms like chest pain, shortness of breath, exertional syncope and he currently feels well although tired.    Presents afebrile with unremarkable vital signs.  Clinical exam is  overall benign.     he had an EKG here which not demonstrate any cardiogenic cause of syncope like WPW, HOCM, Brugada, A-V dissociation, prolonged QTC.  EKG furthermore did not demonstrate any evidence of ischemia and 2 times troponins are negative, so I do not have any concern for ACS potential etiology of symptoms.    Labs are notable for a minimally elevated CK, but no evidence of renal dysfunction.  The rest of the labs are overall benign.  There may be some mild dehydration coupled with alcoholic beverage tonight.    Will plan on treating with fluids.  Overall, the workup in the emergency room is reassuring and if improved and able to tolerate trial of ambulation, may consider discharge.    Clinical Scores:                                   Progress Notes:  2:00 AM EDT: I saw and reevaluate the patient.  He says he feels well and is clearly asymptomatic.  Ambulatory without syncopal symptoms.  He is asking for discharge back this is reasonable given his improvement.  I spoke with the patient about his ED work up, diagnosis and the plan for discharge. I discussed the importance of following up with his PCP. I instructed him to return to the Emergency Room for new or worsening symptoms. All of the pt's questions were answered. The patient is stable at time of discharge.               Complexity of Care:  Admission was considered but after careful review of the patient's presentation, physical examination, diagnostic results, and response to treatment the patient may be safely discharged with outpatient follow-up.    Diagnosis:  Final diagnoses:   Syncope and collapse       Follow Up:  Gregor Gomez MD  [address]  [facility] [address] [phone]    Call in 2 days  For reevaluation    [facility] EMERGENCY DEPARTMENT  [address] [facility] [address] [phone]    As needed, If symptoms worsen          Parts of this note may be an electronic  transcription/translation of spoken language to printed text using the Dragon dictation system        Provider Note Signed by:     Melanie Tate MD  06/21/25 0206

## 2025-06-22 LAB
ALBUMIN SERPL-MCNC: 4.4 G/DL (ref 3.9–4.9)
ALP SERPL-CCNC: 88 IU/L (ref 44–121)
ALT SERPL-CCNC: 23 IU/L (ref 0–44)
AMYLASE SERPL-CCNC: 118 U/L (ref 31–110)
AST SERPL-CCNC: 25 IU/L (ref 0–40)
BILIRUB SERPL-MCNC: 0.6 MG/DL (ref 0–1.2)
BUN SERPL-MCNC: 17 MG/DL (ref 8–27)
BUN/CREAT SERPL: 18 (ref 10–24)
CALCIUM SERPL-MCNC: 9.3 MG/DL (ref 8.6–10.2)
CHLORIDE SERPL-SCNC: 104 MMOL/L (ref 96–106)
CO2 SERPL-SCNC: 21 MMOL/L (ref 20–29)
CREAT SERPL-MCNC: 0.96 MG/DL (ref 0.76–1.27)
EGFRCR SERPLBLD CKD-EPI 2021: 86 ML/MIN/1.73
GLOBULIN SER CALC-MCNC: 2.3 G/DL (ref 1.5–4.5)
GLUCOSE SERPL-MCNC: 91 MG/DL (ref 70–99)
HBA1C MFR BLD: 5.8 % (ref 4.8–5.6)
LDLC SERPL DIRECT ASSAY-MCNC: 106 MG/DL (ref 0–99)
PETH BLD QL SCN: POSITIVE
PETH BLD-MCNC: 24 NG/ML
POTASSIUM SERPL-SCNC: 4.3 MMOL/L (ref 3.5–5.2)
PROT SERPL-MCNC: 6.7 G/DL (ref 6–8.5)
SODIUM SERPL-SCNC: 140 MMOL/L (ref 134–144)

## 2025-07-01 ENCOUNTER — OFFICE VISIT (OUTPATIENT)
Dept: INTERNAL MEDICINE | Facility: CLINIC | Age: 70
End: 2025-07-01
Payer: MEDICARE

## 2025-07-01 VITALS
OXYGEN SATURATION: 96 % | HEIGHT: 74 IN | WEIGHT: 209.2 LBS | HEART RATE: 69 BPM | TEMPERATURE: 97.7 F | DIASTOLIC BLOOD PRESSURE: 70 MMHG | SYSTOLIC BLOOD PRESSURE: 110 MMHG | BODY MASS INDEX: 26.85 KG/M2

## 2025-07-01 DIAGNOSIS — F10.90 ALCOHOL USE: ICD-10-CM

## 2025-07-01 DIAGNOSIS — E78.2 MIXED HYPERLIPIDEMIA: ICD-10-CM

## 2025-07-01 DIAGNOSIS — G47.33 OBSTRUCTIVE SLEEP APNEA, ADULT: ICD-10-CM

## 2025-07-01 DIAGNOSIS — F41.1 GAD (GENERALIZED ANXIETY DISORDER): ICD-10-CM

## 2025-07-01 DIAGNOSIS — R73.01 IFG (IMPAIRED FASTING GLUCOSE): Primary | ICD-10-CM

## 2025-07-01 PROCEDURE — 1160F RVW MEDS BY RX/DR IN RCRD: CPT | Performed by: INTERNAL MEDICINE

## 2025-07-01 PROCEDURE — 99214 OFFICE O/P EST MOD 30 MIN: CPT | Performed by: INTERNAL MEDICINE

## 2025-07-01 PROCEDURE — 1159F MED LIST DOCD IN RCRD: CPT | Performed by: INTERNAL MEDICINE

## 2025-07-01 PROCEDURE — 1126F AMNT PAIN NOTED NONE PRSNT: CPT | Performed by: INTERNAL MEDICINE

## 2025-07-01 NOTE — PROGRESS NOTES
"Hyperlipidemia and ETOH intake      HPI  Uriel Garcia is a 69 y.o. male RTC In f/u:   Has overall been doing well.  Was in ED last week after had 'dehydration' event after 3 days of heavy exercise and heat exposure.   Pt did track ETOH intake since last visit.  Some days had no ETOH intake, some days one drink, and 16 days in 6 month period of > 2 drinks on a day.  Feels like drinking less by keeping log. \"I set some boundaries for myself'.     Has been busy playing tennis and golf and staying very active.  Overall feels like doing well and health has been good.    Notes that son is transitioning his come out as trans recently.  Notes he is \"having a hard time with this\".  Is seeing a therapist along with his wife.  Wonders if he should be having a hard time and \"how common is this\" to have a hard time with this issue.    HLD - on 20 mg atorvastatin now, good tolerance.    JUAN - on CPAP nightly, compliant. No issues.     Answers submitted by the patient for this visit:  Chronic Condition Follow-up (Submitted on 7/1/2025)  Chief Complaint: PCP follow-up  other: Yes  Medication compliance: all of the time  Treatment barriers: no complaince problems  Exercise: most days    Review of Systems   Constitutional: Negative for chills, fever, weight gain and weight loss.   Cardiovascular:  Positive for syncope (single event 6/20/25, seen in ED). Negative for chest pain and dyspnea on exertion.   Neurological:  Negative for dizziness and light-headedness.   Psychiatric/Behavioral:  Negative for altered mental status, depression and substance abuse. The patient is nervous/anxious.        The following portions of the patient's history were reviewed and updated as appropriate: allergies, current medications, past medical history, past social history, and problem list.      Current Outpatient Medications:     atorvastatin (LIPITOR) 20 MG tablet, TAKE 1 TABLET EVERY DAY, Disp: 90 tablet, Rfl: 3    sertraline (ZOLOFT) 50 MG " "tablet, TAKE 1 TABLET BY MOUTH DAILY, Disp: 30 tablet, Rfl: 5    tadalafil (CIALIS) 5 MG tablet, Take 1 tablet by mouth Daily., Disp: , Rfl:     Vitals:    07/01/25 0943   BP: 110/70   BP Location: Left arm   Patient Position: Sitting   Cuff Size: Adult   Pulse: 69   Temp: 97.7 °F (36.5 °C)   TempSrc: Infrared   SpO2: 96%   Weight: 94.9 kg (209 lb 3.2 oz)   Height: 188 cm (74.02\")     Body mass index is 26.85 kg/m².      Physical Exam  Vitals reviewed.   Constitutional:       General: He is not in acute distress.     Appearance: He is well-developed. He is not ill-appearing or toxic-appearing.   HENT:      Head: Normocephalic and atraumatic.      Mouth/Throat:      Mouth: No oral lesions.      Tongue: No lesions.      Pharynx: No pharyngeal swelling or uvula swelling.   Eyes:      General: No scleral icterus.     Conjunctiva/sclera: Conjunctivae normal.      Pupils: Pupils are equal, round, and reactive to light.   Neck:      Vascular: No carotid bruit.   Cardiovascular:      Rate and Rhythm: Normal rate and regular rhythm.      Pulses:           Carotid pulses are 2+ on the right side and 2+ on the left side.       Radial pulses are 2+ on the right side and 2+ on the left side.      Heart sounds: Normal heart sounds.   Pulmonary:      Effort: Pulmonary effort is normal. No respiratory distress.      Breath sounds: Normal breath sounds. No wheezing, rhonchi or rales.   Musculoskeletal:      Cervical back: Normal range of motion and neck supple. No muscular tenderness.   Lymphadenopathy:      Cervical: No cervical adenopathy.   Neurological:      Mental Status: He is alert and oriented to person, place, and time.      Cranial Nerves: No cranial nerve deficit, dysarthria or facial asymmetry.      Gait: Gait normal.   Psychiatric:         Attention and Perception: Attention normal.         Mood and Affect: Mood and affect normal.         Behavior: Behavior normal.         Thought Content: Thought content normal. "         Assessment/ Plan  Diagnoses and all orders for this visit:    IFG (impaired fasting glucose)    KOKI (generalized anxiety disorder)    Mixed hyperlipidemia    Obstructive sleep apnea, adult    Alcohol use        Return in about 6 months (around 1/1/2026) for Medicare Wellness.      Discussion:  Uriel Garcia is a 69 y.o. male RTC In f/u:   1.  Hx of one week of variable GI sx noted early 6/2022; event with marked constipation and nausea/ bloating after drank 2 large ETOH drinks after dehydrating golf round. Followed by lizbeth colored stools and decline in energy; syncope event 6/2025 after 3 days of heavy physical activity, seen in ED and given IVF's; long history EtOH use, variable intake  -patient has been tracking alcohol intake and has rather variable intake based on social activities.  PeTH level 24 today and does seem to correlate well with patient's verbal report of EtOH intake.  Encourage patient to C/W regular exercise and would still like to see him with reduced EtOH intake overall, patient agreeable.    2.  Chronic elevated amylase  -persists, stable on labs. Recall, assessed pt for celiac disease, HIV infection, lymphoma, rheumatoid arthritis, and monoclonal gammopathy in 8/2022 and w/u negative.  Do will wonder if related to ETOH intake, but did not resolve after cessation trial last year and stated cessation trial this year.  D/W pt Ddx includes chronic amylasemia due to genetic condition (Diana's syndrome) vs. Unrecognized ETOH intake.  Review of EtOH intake per #1 does not suggest alcohol as heart rate of issue at this time.  Trend.    3. KOKI - mood controlled on SSRI,S/P talk therapy with Dr. Hannah, now out of individual therapy.  C/W EtOH reduction and aggressive exercise routine.  Patient now in family therapy after son coming out as trans, acknowledging he is having a hard time with this.  D/W patient, at his request, this issue today and emphasized that his emotional response to his  son's revelation is what it is and that the key is what he does with those emotions.  Encouraged his continued work and family therapy to address this issue, again emphasizing the response to his emotions and not shaming the fact that he has emotions about it.    4. HLD, LDL baseline 194 off statin/ IFG - LDL right at goal on 20 mg atorvastatin.  C/W same.  LFTs OK.  5. JUAN - on CPAP nightly, compliant.     RTC 6 months AWV, F labs prior

## (undated) DEVICE — CANN O2 ETCO2 FITS ALL CONN CO2 SMPL A/ 7IN DISP LF

## (undated) DEVICE — SINGLE-USE BIOPSY FORCEPS: Brand: RADIAL JAW 4

## (undated) DEVICE — CANN NASL CO2 TRULINK W/O2 A/

## (undated) DEVICE — DRSNG PAD ABD 8X10IN STRL

## (undated) DEVICE — LN SMPL CO2 SHTRM SD STREAM W/M LUER

## (undated) DEVICE — SKIN PREP TRAY W/CHG: Brand: MEDLINE INDUSTRIES, INC.

## (undated) DEVICE — SUT PROLN 4/0 FS2 18IN 8683G

## (undated) DEVICE — GLV SURG BIOGEL LTX PF 8

## (undated) DEVICE — OCCLUSIVE GAUZE STRIP,3% BISMUTH TRIBROMOPHENATE IN PETROLATUM BLEND: Brand: XEROFORM

## (undated) DEVICE — TUBING, SUCTION, 1/4" X 10', STRAIGHT: Brand: MEDLINE

## (undated) DEVICE — SENSR O2 OXIMAX FNGR A/ 18IN NONSTR

## (undated) DEVICE — TBG PUMP ARTHSCP MAIN AR6400 16FT

## (undated) DEVICE — GLV SURG BIOGEL LTX PF 8 1/2

## (undated) DEVICE — KT ORCA ORCAPOD DISP STRL

## (undated) DEVICE — THE TORRENT IRRIGATION SCOPE CONNECTOR IS USED WITH THE TORRENT IRRIGATION TUBING TO PROVIDE IRRIGATION FLUIDS SUCH AS STERILE WATER DURING GASTROINTESTINAL ENDOSCOPIC PROCEDURES WHEN USED IN CONJUNCTION WITH AN IRRIGATION PUMP (OR ELECTROSURGICAL UNIT).: Brand: TORRENT

## (undated) DEVICE — GLV SURG BIOGEL LTX PF 6 1/2

## (undated) DEVICE — SPNG GZ WOVN 4X4IN 12PLY 10/BX STRL

## (undated) DEVICE — ADAPT CLN BIOGUARD AIR/H2O DISP

## (undated) DEVICE — GLV SURG SENSICARE MICRO PF LF 6.5 STRL

## (undated) DEVICE — KT VLV BIOGUARD SXN BIOP AIR/H20 CONN 4PC DISP

## (undated) DEVICE — PK ARTHSCP SHLDR TOWER 40

## (undated) DEVICE — ARM SLING: Brand: DEROYAL

## (undated) DEVICE — VULCAN GENERATOR ADAPTER,PACKAGED                                    3 PER BOX, STERILE. REQUIRED TO                                    OPERATE 7205962, PACKAGED                                    NON-STERILE, REUSABLE